# Patient Record
Sex: FEMALE | Race: WHITE | NOT HISPANIC OR LATINO | URBAN - METROPOLITAN AREA
[De-identification: names, ages, dates, MRNs, and addresses within clinical notes are randomized per-mention and may not be internally consistent; named-entity substitution may affect disease eponyms.]

---

## 2023-03-28 LAB
ALANINE AMINOTRANSFERASE (SGPT) (U/L) IN SER/PLAS: 14 U/L (ref 7–45)
ALBUMIN (G/DL) IN SER/PLAS: 4.4 G/DL (ref 3.4–5)
ALKALINE PHOSPHATASE (U/L) IN SER/PLAS: 75 U/L (ref 33–136)
ANION GAP IN SER/PLAS: 16 MMOL/L (ref 10–20)
ASPARTATE AMINOTRANSFERASE (SGOT) (U/L) IN SER/PLAS: 17 U/L (ref 9–39)
BILIRUBIN TOTAL (MG/DL) IN SER/PLAS: 0.5 MG/DL (ref 0–1.2)
C REACTIVE PROTEIN (MG/L) IN SER/PLAS: 0.17 MG/DL
CALCIUM (MG/DL) IN SER/PLAS: 10.2 MG/DL (ref 8.6–10.6)
CARBON DIOXIDE, TOTAL (MMOL/L) IN SER/PLAS: 26 MMOL/L (ref 21–32)
CHLORIDE (MMOL/L) IN SER/PLAS: 106 MMOL/L (ref 98–107)
CREATININE (MG/DL) IN SER/PLAS: 0.89 MG/DL (ref 0.5–1.05)
ERYTHROCYTE DISTRIBUTION WIDTH (RATIO) BY AUTOMATED COUNT: 13.4 % (ref 11.5–14.5)
ERYTHROCYTE MEAN CORPUSCULAR HEMOGLOBIN CONCENTRATION (G/DL) BY AUTOMATED: 31.1 G/DL (ref 32–36)
ERYTHROCYTE MEAN CORPUSCULAR VOLUME (FL) BY AUTOMATED COUNT: 101 FL (ref 80–100)
ERYTHROCYTES (10*6/UL) IN BLOOD BY AUTOMATED COUNT: 4.5 X10E12/L (ref 4–5.2)
GFR FEMALE: 64 ML/MIN/1.73M2
GLUCOSE (MG/DL) IN SER/PLAS: 107 MG/DL (ref 74–99)
HEMATOCRIT (%) IN BLOOD BY AUTOMATED COUNT: 45.3 % (ref 36–46)
HEMOGLOBIN (G/DL) IN BLOOD: 14.1 G/DL (ref 12–16)
LEUKOCYTES (10*3/UL) IN BLOOD BY AUTOMATED COUNT: 6.9 X10E9/L (ref 4.4–11.3)
NRBC (PER 100 WBCS) BY AUTOMATED COUNT: 0 /100 WBC (ref 0–0)
PLATELETS (10*3/UL) IN BLOOD AUTOMATED COUNT: 209 X10E9/L (ref 150–450)
POTASSIUM (MMOL/L) IN SER/PLAS: 4.4 MMOL/L (ref 3.5–5.3)
PROTEIN TOTAL: 7.2 G/DL (ref 6.4–8.2)
SEDIMENTATION RATE, ERYTHROCYTE: 15 MM/H (ref 0–30)
SODIUM (MMOL/L) IN SER/PLAS: 144 MMOL/L (ref 136–145)
UREA NITROGEN (MG/DL) IN SER/PLAS: 22 MG/DL (ref 6–23)

## 2023-04-12 LAB
ANION GAP IN SER/PLAS: 18 MMOL/L (ref 10–20)
CALCIUM (MG/DL) IN SER/PLAS: 10.5 MG/DL (ref 8.6–10.6)
CARBON DIOXIDE, TOTAL (MMOL/L) IN SER/PLAS: 28 MMOL/L (ref 21–32)
CHLORIDE (MMOL/L) IN SER/PLAS: 108 MMOL/L (ref 98–107)
CREATININE (MG/DL) IN SER/PLAS: 0.99 MG/DL (ref 0.5–1.05)
ERYTHROCYTE DISTRIBUTION WIDTH (RATIO) BY AUTOMATED COUNT: 13.5 % (ref 11.5–14.5)
ERYTHROCYTE MEAN CORPUSCULAR HEMOGLOBIN CONCENTRATION (G/DL) BY AUTOMATED: 31.2 G/DL (ref 32–36)
ERYTHROCYTE MEAN CORPUSCULAR VOLUME (FL) BY AUTOMATED COUNT: 101 FL (ref 80–100)
ERYTHROCYTES (10*6/UL) IN BLOOD BY AUTOMATED COUNT: 4.63 X10E12/L (ref 4–5.2)
GFR FEMALE: 56 ML/MIN/1.73M2
GLUCOSE (MG/DL) IN SER/PLAS: 98 MG/DL (ref 74–99)
HEMATOCRIT (%) IN BLOOD BY AUTOMATED COUNT: 46.8 % (ref 36–46)
HEMOGLOBIN (G/DL) IN BLOOD: 14.6 G/DL (ref 12–16)
LEUKOCYTES (10*3/UL) IN BLOOD BY AUTOMATED COUNT: 7.4 X10E9/L (ref 4.4–11.3)
NRBC (PER 100 WBCS) BY AUTOMATED COUNT: 0 /100 WBC (ref 0–0)
PLATELETS (10*3/UL) IN BLOOD AUTOMATED COUNT: 243 X10E9/L (ref 150–450)
POTASSIUM (MMOL/L) IN SER/PLAS: 4.8 MMOL/L (ref 3.5–5.3)
SODIUM (MMOL/L) IN SER/PLAS: 149 MMOL/L (ref 136–145)
THYROTROPIN (MIU/L) IN SER/PLAS BY DETECTION LIMIT <= 0.05 MIU/L: 5.26 MIU/L (ref 0.44–3.98)
THYROXINE (T4) FREE (NG/DL) IN SER/PLAS: 1.04 NG/DL (ref 0.78–1.48)
UREA NITROGEN (MG/DL) IN SER/PLAS: 32 MG/DL (ref 6–23)

## 2023-04-20 LAB
ALANINE AMINOTRANSFERASE (SGPT) (U/L) IN SER/PLAS: 16 U/L (ref 7–45)
ALBUMIN (G/DL) IN SER/PLAS: 4.6 G/DL (ref 3.4–5)
ALKALINE PHOSPHATASE (U/L) IN SER/PLAS: 89 U/L (ref 33–136)
ANION GAP IN SER/PLAS: 15 MMOL/L (ref 10–20)
ASPARTATE AMINOTRANSFERASE (SGOT) (U/L) IN SER/PLAS: 17 U/L (ref 9–39)
BILIRUBIN TOTAL (MG/DL) IN SER/PLAS: 0.5 MG/DL (ref 0–1.2)
CALCIUM (MG/DL) IN SER/PLAS: 10 MG/DL (ref 8.6–10.6)
CARBON DIOXIDE, TOTAL (MMOL/L) IN SER/PLAS: 31 MMOL/L (ref 21–32)
CHLORIDE (MMOL/L) IN SER/PLAS: 105 MMOL/L (ref 98–107)
CREATININE (MG/DL) IN SER/PLAS: 0.98 MG/DL (ref 0.5–1.05)
GFR FEMALE: 57 ML/MIN/1.73M2
GLUCOSE (MG/DL) IN SER/PLAS: 87 MG/DL (ref 74–99)
POTASSIUM (MMOL/L) IN SER/PLAS: 4.1 MMOL/L (ref 3.5–5.3)
PROTEIN TOTAL: 7.1 G/DL (ref 6.4–8.2)
SODIUM (MMOL/L) IN SER/PLAS: 147 MMOL/L (ref 136–145)
UREA NITROGEN (MG/DL) IN SER/PLAS: 21 MG/DL (ref 6–23)

## 2023-05-16 LAB
6-ACETYLMORPHINE: <25 NG/ML
7-AMINOCLONAZEPAM: <25 NG/ML
ALPHA-HYDROXYALPRAZOLAM: <25 NG/ML
ALPHA-HYDROXYMIDAZOLAM: <25 NG/ML
ALPRAZOLAM: <25 NG/ML
AMPHETAMINE (PRESENCE) IN URINE BY SCREEN METHOD: ABNORMAL
BARBITURATES PRESENCE IN URINE BY SCREEN METHOD: ABNORMAL
CANNABINOIDS IN URINE BY SCREEN METHOD: ABNORMAL
CHLORDIAZEPOXIDE: <25 NG/ML
CLONAZEPAM: <25 NG/ML
COCAINE (PRESENCE) IN URINE BY SCREEN METHOD: ABNORMAL
CODEINE: <50 NG/ML
CREATINE, URINE FOR DRUG: 91.1 MG/DL
DIAZEPAM: <25 NG/ML
DRUG SCREEN COMMENT URINE: ABNORMAL
EDDP: <25 NG/ML
FENTANYL CONFIRMATION, URINE: <2.5 NG/ML
HYDROCODONE: 466 NG/ML
HYDROMORPHONE: 157 NG/ML
LORAZEPAM: <25 NG/ML
METHADONE CONFIRMATION,URINE: <25 NG/ML
MIDAZOLAM: <25 NG/ML
MORPHINE URINE: <50 NG/ML
NORDIAZEPAM: <25 NG/ML
NORFENTANYL: <2.5 NG/ML
NORHYDROCODONE: 757 NG/ML
NOROXYCODONE: <25 NG/ML
O-DESMETHYLTRAMADOL: <50 NG/ML
OXAZEPAM: <25 NG/ML
OXYCODONE: <25 NG/ML
OXYMORPHONE: <25 NG/ML
PHENCYCLIDINE (PRESENCE) IN URINE BY SCREEN METHOD: ABNORMAL
TEMAZEPAM: <25 NG/ML
TRAMADOL: <50 NG/ML
ZOLPIDEM METABOLITE (ZCA): <25 NG/ML
ZOLPIDEM: <25 NG/ML

## 2023-06-01 LAB
ANION GAP IN SER/PLAS: 13 MMOL/L (ref 10–20)
BASOPHILS (10*3/UL) IN BLOOD BY AUTOMATED COUNT: 0.07 X10E9/L (ref 0–0.1)
BASOPHILS/100 LEUKOCYTES IN BLOOD BY AUTOMATED COUNT: 0.7 % (ref 0–2)
CALCIUM (MG/DL) IN SER/PLAS: 9.7 MG/DL (ref 8.6–10.3)
CARBON DIOXIDE, TOTAL (MMOL/L) IN SER/PLAS: 28 MMOL/L (ref 21–32)
CHLORIDE (MMOL/L) IN SER/PLAS: 99 MMOL/L (ref 98–107)
CREATININE (MG/DL) IN SER/PLAS: 0.76 MG/DL (ref 0.5–1.05)
EOSINOPHILS (10*3/UL) IN BLOOD BY AUTOMATED COUNT: 0.03 X10E9/L (ref 0–0.4)
EOSINOPHILS/100 LEUKOCYTES IN BLOOD BY AUTOMATED COUNT: 0.3 % (ref 0–6)
ERYTHROCYTE DISTRIBUTION WIDTH (RATIO) BY AUTOMATED COUNT: 12.2 % (ref 11.5–14.5)
ERYTHROCYTE MEAN CORPUSCULAR HEMOGLOBIN CONCENTRATION (G/DL) BY AUTOMATED: 32.9 G/DL (ref 32–36)
ERYTHROCYTE MEAN CORPUSCULAR VOLUME (FL) BY AUTOMATED COUNT: 95 FL (ref 80–100)
ERYTHROCYTES (10*6/UL) IN BLOOD BY AUTOMATED COUNT: 4.11 X10E12/L (ref 4–5.2)
GFR FEMALE: 77 ML/MIN/1.73M2
GLUCOSE (MG/DL) IN SER/PLAS: 95 MG/DL (ref 74–99)
HEMATOCRIT (%) IN BLOOD BY AUTOMATED COUNT: 38.9 % (ref 36–46)
HEMOGLOBIN (G/DL) IN BLOOD: 12.8 G/DL (ref 12–16)
IMMATURE GRANULOCYTES/100 LEUKOCYTES IN BLOOD BY AUTOMATED COUNT: 0.4 % (ref 0–0.9)
LEUKOCYTES (10*3/UL) IN BLOOD BY AUTOMATED COUNT: 10.5 X10E9/L (ref 4.4–11.3)
LYMPHOCYTES (10*3/UL) IN BLOOD BY AUTOMATED COUNT: 0.9 X10E9/L (ref 0.8–3)
LYMPHOCYTES/100 LEUKOCYTES IN BLOOD BY AUTOMATED COUNT: 8.6 % (ref 13–44)
MONOCYTES (10*3/UL) IN BLOOD BY AUTOMATED COUNT: 1.57 X10E9/L (ref 0.05–0.8)
MONOCYTES/100 LEUKOCYTES IN BLOOD BY AUTOMATED COUNT: 14.9 % (ref 2–10)
NEUTROPHILS (10*3/UL) IN BLOOD BY AUTOMATED COUNT: 7.91 X10E9/L (ref 1.6–5.5)
NEUTROPHILS/100 LEUKOCYTES IN BLOOD BY AUTOMATED COUNT: 75.1 % (ref 40–80)
PLATELETS (10*3/UL) IN BLOOD AUTOMATED COUNT: 326 X10E9/L (ref 150–450)
POTASSIUM (MMOL/L) IN SER/PLAS: 3.3 MMOL/L (ref 3.5–5.3)
SODIUM (MMOL/L) IN SER/PLAS: 137 MMOL/L (ref 136–145)
UREA NITROGEN (MG/DL) IN SER/PLAS: 14 MG/DL (ref 6–23)

## 2023-06-03 LAB — STAPH/MRSA SCREEN, CULTURE: ABNORMAL

## 2023-10-02 ENCOUNTER — TREATMENT (OUTPATIENT)
Dept: PHYSICAL THERAPY | Facility: CLINIC | Age: 83
End: 2023-10-02
Payer: MEDICARE

## 2023-10-02 DIAGNOSIS — M21.379 FOOT DROP, UNSPECIFIED FOOT: ICD-10-CM

## 2023-10-02 DIAGNOSIS — M79.651 PAIN IN RIGHT THIGH: ICD-10-CM

## 2023-10-02 DIAGNOSIS — M25.561 PAIN IN RIGHT KNEE: ICD-10-CM

## 2023-10-02 DIAGNOSIS — M25.551 RIGHT HIP PAIN: Primary | ICD-10-CM

## 2023-10-02 PROCEDURE — 97110 THERAPEUTIC EXERCISES: CPT | Mod: GP | Performed by: PHYSICAL THERAPIST

## 2023-10-02 ASSESSMENT — PAIN - FUNCTIONAL ASSESSMENT: PAIN_FUNCTIONAL_ASSESSMENT: 0-10

## 2023-10-02 ASSESSMENT — PAIN SCALES - GENERAL: PAINLEVEL_OUTOF10: 0 - NO PAIN

## 2023-10-02 NOTE — PROGRESS NOTES
Physical Therapy    Physical Therapy Treatment    Patient Name: Mckenna Ferguson  MRN: [id number]  Today's Date: 10/2/2023  Time Calculation  Start Time: 1024  Stop Time: 1103  Time Calculation (min): 39 min      Assessment:  Pt did well with session, some fatigue        Plan: visit 3/5 cert  OP PT Plan  Treatment/Interventions: Gait training, Therapeutic activities, Therapeutic exercises  PT Plan: Skilled PT  PT Frequency: 2 times per week  Duration: 12 weeks  Certification Period Start Date: 09/11/23  Certification Period End Date: 11/09/23  Number of Treatments Authorized: 5  Rehab Potential: Good  Plan of Care Agreement: Patient    Current Problem  1. Right hip pain        2. Foot drop, unspecified foot  Referral to Physical Therapy      3. Pain in right knee  Referral to Physical Therapy      4. Pain in right thigh  Referral to Physical Therapy          Subjective   Pt reports recovering from flu, also having non surgical groin pain left-4/10     Precautions  Precautions  Post-Surgical Precautions: Right hip precautions     Pain  Pain Assessment: 0-10  Pain Score: 0 - No pain    Objective   Pt not wearing hearing aides [place], hip still dropping walking with cane      Treatments:  supine: Rt MERCEDEZ 15+ weeks p/o  seated Nustep UE/LE, seat back not past 90 deg. L3 8 min  , oral review hep     hook:  -hip ER bilat orange  band 2 x 13  -hip add isometrics 2x13 gentle-small ball  w/TVA-deferred today  - mini bridge 2x15    supine  sidelying hip abd RT with pillow between knees, 1 x 10, 1 x 8-min PT assist for form    seated:  LAQ 2 x 15/2# LT/RT  toe raise 2x15     sit to stand 2 x 10, raised mat table-no hands     stand with UE support   hip abd 2x14  small range RT-DNP    RT ext small range 2 x 14  hamcurl 2 x 14/2# RT       mini squats x15 B UE support  no UE support-side side  wt shifts 30x   standing heel raises 2 x 10  calf stretch RB x 20 sec   side step x8 B UE support   step up forward 4 inch 2 x 12 RTx1 UE  support  RT lateral step ups 4 inch 2x10 B UE support DNP today  rt on block step down left,2 inch x12    cane and rail step over back line on floor x10 ea       Goals:  6 STG  Pt will verbalize MERCEDEZ precautions>>A  Pt will be I with HEP>>A  Pt will improve WOMAC scores 10% indicating improved function>>ongoing  Pt will increase hip rom to 90 deg flexion>>A  LTG 12  Pt will perform stairs step over step with hand rail assist (PLOF)  Pt will be able to drive and report how to transfer correctly.>>A  Pt will increase strength 1 MMT grade RT LE  Pt will walk room distances with SC without Trendelenburg

## 2023-10-04 PROBLEM — L81.4 OTHER MELANIN HYPERPIGMENTATION: Status: ACTIVE | Noted: 2019-04-17
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
## 2023-10-04 PROBLEM — M16.11 OSTEOARTHRITIS OF RIGHT HIP: Status: ACTIVE | Noted: 2023-10-04

## 2023-10-04 PROBLEM — M21.379 FOOT-DROP: Status: ACTIVE | Noted: 2023-10-04

## 2023-10-04 PROBLEM — R31.9 HEMATURIA: Status: ACTIVE | Noted: 2023-10-04

## 2023-10-04 PROBLEM — M48.061 LUMBAR STENOSIS: Status: ACTIVE | Noted: 2023-10-04

## 2023-10-04 PROBLEM — H11.821 CONJUNCTIVOCHALASIS OF RIGHT EYE: Status: ACTIVE | Noted: 2023-10-04

## 2023-10-04 PROBLEM — R53.83 FATIGUE: Status: ACTIVE | Noted: 2023-10-04

## 2023-10-04 PROBLEM — R42 DIZZINESS: Status: ACTIVE | Noted: 2023-10-04

## 2023-10-04 PROBLEM — M54.16 LUMBAR RADICULOPATHY: Status: ACTIVE | Noted: 2023-10-04

## 2023-10-04 PROBLEM — C44.42 SQUAMOUS CELL CARCINOMA OF SCALP AND SKIN OF NECK: Status: ACTIVE | Noted: 2019-04-17

## 2023-10-04 PROBLEM — H04.123 DRY EYES, BILATERAL: Status: ACTIVE | Noted: 2023-10-04

## 2023-10-04 PROBLEM — H91.90 CHANGE IN HEARING: Status: ACTIVE | Noted: 2023-10-04

## 2023-10-04 PROBLEM — M72.0 DUPUYTREN CONTRACTURE: Status: ACTIVE | Noted: 2023-10-04

## 2023-10-04 PROBLEM — L57.0 ACTINIC KERATOSIS: Status: ACTIVE | Noted: 2019-04-17

## 2023-10-04 PROBLEM — D22.60 MELANOCYTIC NEVI OF UNSPECIFIED UPPER LIMB, INCLUDING SHOULDER: Status: ACTIVE | Noted: 2019-04-17

## 2023-10-04 PROBLEM — D36.7 BENIGN NEOPLASM OF TRUNK: Status: ACTIVE | Noted: 2019-04-17
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
## 2023-10-04 PROBLEM — D31.31 CHOROIDAL NEVUS OF RIGHT EYE: Status: ACTIVE | Noted: 2023-10-04

## 2023-10-04 PROBLEM — G89.29 CHRONIC LEG PAIN: Status: ACTIVE | Noted: 2023-10-04

## 2023-10-04 PROBLEM — E78.00 PURE HYPERCHOLESTEROLEMIA: Status: ACTIVE | Noted: 2023-10-04

## 2023-10-04 PROBLEM — D18.01 HEMANGIOMA OF SKIN AND SUBCUTANEOUS TISSUE: Status: ACTIVE | Noted: 2019-04-17

## 2023-10-04 PROBLEM — N90.89 PERINEAL MASS, FEMALE: Status: ACTIVE | Noted: 2023-10-04

## 2023-10-04 PROBLEM — D18.00 ANGIOMA: Status: ACTIVE | Noted: 2019-04-17

## 2023-10-04 PROBLEM — H26.493 PCO (POSTERIOR CAPSULAR OPACIFICATION), BILATERAL: Status: ACTIVE | Noted: 2023-10-04

## 2023-10-04 PROBLEM — M17.11 ARTHRITIS OF RIGHT KNEE: Status: ACTIVE | Noted: 2023-10-04

## 2023-10-04 PROBLEM — M79.606 CHRONIC LEG PAIN: Status: ACTIVE | Noted: 2023-10-04

## 2023-10-04 PROBLEM — M06.9 RHEUMATOID ARTHRITIS (MULTI): Status: ACTIVE | Noted: 2023-10-04

## 2023-10-04 PROBLEM — D36.7 BENIGN NEOPLASM OF LOWER EXTREMITY: Status: ACTIVE | Noted: 2019-04-17

## 2023-10-04 PROBLEM — R13.10 DYSPHAGIA: Status: ACTIVE | Noted: 2023-10-04

## 2023-10-04 PROBLEM — C44.519 BASAL CELL CARCINOMA OF SKIN OF OTHER PART OF TRUNK: Status: ACTIVE | Noted: 2019-04-17

## 2023-10-04 PROBLEM — C44.329 SQUAMOUS CELL CARCINOMA OF SKIN OF OTHER PARTS OF FACE: Status: ACTIVE | Noted: 2019-04-17

## 2023-10-04 PROBLEM — L82.1 SEBORRHEIC KERATOSIS: Status: ACTIVE | Noted: 2019-04-17

## 2023-10-04 PROBLEM — H90.3 SENSORINEURAL HEARING LOSS, ASYMMETRICAL: Status: ACTIVE | Noted: 2023-10-04

## 2023-10-04 PROBLEM — H11.822 CONJUNCTIVOCHALASIS OF LEFT EYE: Status: ACTIVE | Noted: 2023-10-04

## 2023-10-04 PROBLEM — D22.70 MELANOCYTIC NEVI OF UNSPECIFIED LOWER LIMB, INCLUDING HIP: Status: ACTIVE | Noted: 2019-04-17

## 2023-10-04 PROBLEM — N95.2 VAGINAL ATROPHY: Status: ACTIVE | Noted: 2023-10-04

## 2023-10-04 PROBLEM — D04.4 CARCINOMA IN SITU OF SKIN OF SCALP AND NECK: Status: ACTIVE | Noted: 2019-04-17

## 2023-10-04 PROBLEM — R79.9 ABNORMAL BLOOD CHEMISTRY: Status: ACTIVE | Noted: 2023-10-04

## 2023-10-04 PROBLEM — D72.9 NEUTROPHILIA: Status: ACTIVE | Noted: 2023-10-04

## 2023-10-04 PROBLEM — L81.4 LENTIGINES: Status: ACTIVE | Noted: 2019-04-17

## 2023-10-04 PROBLEM — C44.619 BASAL CELL CARCINOMA OF SKIN OF LEFT UPPER LIMB, INCLUDING SHOULDER: Status: ACTIVE | Noted: 2019-04-17

## 2023-10-04 PROBLEM — M10.9 GOUT: Status: ACTIVE | Noted: 2023-10-04

## 2023-10-04 PROBLEM — R71.8 ELEVATED MCV: Status: ACTIVE | Noted: 2023-10-04

## 2023-10-04 PROBLEM — D72.828 NEUTROPHILIA: Status: ACTIVE | Noted: 2023-10-04

## 2023-10-04 PROBLEM — E55.9 VITAMIN D DEFICIENCY: Status: ACTIVE | Noted: 2023-10-04

## 2023-10-04 PROBLEM — D36.7 BENIGN NEOPLASM OF UPPER EXTREMITY: Status: ACTIVE | Noted: 2019-04-17

## 2023-10-04 PROBLEM — N28.9 RENAL INSUFFICIENCY: Status: ACTIVE | Noted: 2023-10-04

## 2023-10-04 PROBLEM — T81.9XXA COMPLICATION OF SURGICAL PROCEDURE: Status: ACTIVE | Noted: 2023-10-04

## 2023-10-04 PROBLEM — D22.5 MELANOCYTIC NEVI OF TRUNK: Status: ACTIVE | Noted: 2019-04-17

## 2023-10-04 PROBLEM — G90.9 AUTONOMIC DYSFUNCTION: Status: ACTIVE | Noted: 2023-10-04

## 2023-10-04 PROBLEM — E03.9 HYPOTHYROIDISM: Status: ACTIVE | Noted: 2023-10-04

## 2023-10-04 PROBLEM — Z96.1 PSEUDOPHAKIA, BOTH EYES: Status: ACTIVE | Noted: 2023-10-04

## 2023-10-04 PROBLEM — I10 HYPERTENSION: Status: ACTIVE | Noted: 2023-10-04

## 2023-10-04 PROBLEM — G47.00 INSOMNIA: Status: ACTIVE | Noted: 2023-10-04

## 2023-10-04 PROBLEM — H93.13 SUBJECTIVE TINNITUS, BILATERAL: Status: ACTIVE | Noted: 2023-10-04

## 2023-10-04 PROBLEM — B02.9 SHINGLES: Status: ACTIVE | Noted: 2023-10-04

## 2023-10-04 PROBLEM — H01.009 BLEPHARITIS: Status: ACTIVE | Noted: 2023-10-04

## 2023-10-04 PROBLEM — R15.9 INCONTINENCE OF FECES: Status: ACTIVE | Noted: 2023-10-04

## 2023-10-04 PROBLEM — G47.9 SLEEP DISTURBANCES: Status: ACTIVE | Noted: 2023-10-04

## 2023-10-04 PROBLEM — N81.10 FEMALE BLADDER PROLAPSE: Status: ACTIVE | Noted: 2023-10-04

## 2023-10-04 PROBLEM — M35.3 POLYMYALGIA RHEUMATICA (MULTI): Status: ACTIVE | Noted: 2023-10-04

## 2023-10-04 RX ORDER — ATORVASTATIN CALCIUM 10 MG/1
10 TABLET, FILM COATED ORAL DAILY
COMMUNITY
Start: 2023-07-31

## 2023-10-04 RX ORDER — TRIAMCINOLONE ACETONIDE 1 MG/G
OINTMENT TOPICAL
COMMUNITY
Start: 2021-02-09

## 2023-10-04 RX ORDER — RIVAROXABAN 20 MG/1
20 TABLET, FILM COATED ORAL DAILY
COMMUNITY
Start: 2023-07-31

## 2023-10-04 RX ORDER — ATENOLOL 25 MG/1
25 TABLET ORAL
COMMUNITY
Start: 2023-07-24

## 2023-10-04 RX ORDER — GABAPENTIN 300 MG/1
300 CAPSULE ORAL NIGHTLY
COMMUNITY

## 2023-10-04 RX ORDER — ERGOCALCIFEROL 1.25 MG/1
1 CAPSULE ORAL
COMMUNITY
Start: 2022-06-13

## 2023-10-04 RX ORDER — FLUOROURACIL 50 MG/G
CREAM TOPICAL
COMMUNITY
Start: 2022-10-25

## 2023-10-04 RX ORDER — HYDROXYCHLOROQUINE SULFATE 200 MG/1
1 TABLET, FILM COATED ORAL DAILY
COMMUNITY

## 2023-10-04 RX ORDER — GABAPENTIN 400 MG/1
400 CAPSULE ORAL NIGHTLY
COMMUNITY
Start: 2023-08-30 | End: 2023-10-29

## 2023-10-04 RX ORDER — LEVOTHYROXINE SODIUM 50 UG/1
50 TABLET ORAL
COMMUNITY
Start: 2023-06-03

## 2023-10-04 RX ORDER — HYDROXYZINE HYDROCHLORIDE 10 MG/1
TABLET, FILM COATED ORAL
COMMUNITY
Start: 2022-10-16

## 2023-10-04 RX ORDER — FAMOTIDINE 40 MG/1
1 TABLET, FILM COATED ORAL NIGHTLY
COMMUNITY
Start: 2022-03-11

## 2023-10-04 RX ORDER — PANTOPRAZOLE SODIUM 40 MG/1
TABLET, DELAYED RELEASE ORAL
COMMUNITY
Start: 2023-06-14

## 2023-10-04 RX ORDER — HYDROCHLOROTHIAZIDE 12.5 MG/1
12.5 TABLET ORAL DAILY
COMMUNITY
Start: 2023-06-07

## 2023-10-04 RX ORDER — CHOLECALCIFEROL (VITAMIN D3) 125 MCG
CAPSULE ORAL
COMMUNITY

## 2023-10-04 RX ORDER — LEVOTHYROXINE SODIUM 75 UG/1
1 TABLET ORAL DAILY
COMMUNITY
Start: 2012-08-23

## 2023-10-04 RX ORDER — ALLOPURINOL 100 MG/1
100 TABLET ORAL DAILY
COMMUNITY

## 2023-10-04 RX ORDER — LEFLUNOMIDE 10 MG/1
10 TABLET ORAL DAILY
COMMUNITY
End: 2023-12-29 | Stop reason: SDUPTHER

## 2023-10-04 RX ORDER — MICONAZOLE NITRATE 2 %
POWDER (GRAM) TOPICAL
COMMUNITY
Start: 2017-02-28

## 2023-10-04 RX ORDER — DUPILUMAB 300 MG/2ML
INJECTION, SOLUTION SUBCUTANEOUS
COMMUNITY
Start: 2022-12-06

## 2023-10-04 RX ORDER — DULOXETIN HYDROCHLORIDE 60 MG/1
60 CAPSULE, DELAYED RELEASE ORAL DAILY
COMMUNITY

## 2023-10-04 RX ORDER — CETIRIZINE HYDROCHLORIDE 10 MG/1
1 TABLET ORAL DAILY
COMMUNITY
Start: 2022-07-29

## 2023-10-04 RX ORDER — ACETAMINOPHEN AND CODEINE PHOSPHATE 300; 30 MG/1; MG/1
1 TABLET ORAL NIGHTLY
COMMUNITY
Start: 2023-06-09

## 2023-10-04 RX ORDER — CLOBETASOL PROPIONATE 0.5 MG/G
OINTMENT TOPICAL
COMMUNITY
Start: 2021-05-28

## 2023-10-04 RX ORDER — CALCIPOTRIENE 50 UG/G
CREAM TOPICAL
COMMUNITY
Start: 2022-10-25

## 2023-10-04 RX ORDER — CHLORHEXIDINE GLUCONATE ORAL RINSE 1.2 MG/ML
SOLUTION DENTAL
COMMUNITY
Start: 2023-06-01

## 2023-10-04 RX ORDER — AMLODIPINE BESYLATE 10 MG/1
10 TABLET ORAL DAILY
COMMUNITY

## 2023-10-05 ENCOUNTER — APPOINTMENT (OUTPATIENT)
Dept: PHYSICAL THERAPY | Facility: CLINIC | Age: 83
End: 2023-10-05
Payer: MEDICARE

## 2023-10-05 ENCOUNTER — TREATMENT (OUTPATIENT)
Dept: PHYSICAL THERAPY | Facility: CLINIC | Age: 83
End: 2023-10-05
Payer: MEDICARE

## 2023-10-05 DIAGNOSIS — R26.9 ABNORMAL GAIT: ICD-10-CM

## 2023-10-05 DIAGNOSIS — Z96.641 PRESENCE OF RIGHT ARTIFICIAL HIP JOINT: Primary | ICD-10-CM

## 2023-10-05 DIAGNOSIS — R26.2 DIFFICULTY WALKING: ICD-10-CM

## 2023-10-05 DIAGNOSIS — M25.551 RIGHT HIP PAIN: Primary | ICD-10-CM

## 2023-10-05 DIAGNOSIS — Z96.641 PRESENCE OF RIGHT ARTIFICIAL HIP JOINT: ICD-10-CM

## 2023-10-05 PROCEDURE — 97110 THERAPEUTIC EXERCISES: CPT | Mod: GP | Performed by: PHYSICAL THERAPIST

## 2023-10-05 ASSESSMENT — ENCOUNTER SYMPTOMS
DEPRESSION: 0
LOSS OF SENSATION IN FEET: 1
OCCASIONAL FEELINGS OF UNSTEADINESS: 1

## 2023-10-05 NOTE — PROGRESS NOTES
Physical Therapy    Physical Therapy Treatment    Patient Name: Mckenna Ferguson  MRN: [id number]  Today's Date: 10/5/2023  Time Calculation  Start Time: 1343  Stop Time: 1428  Time Calculation (min): 45 min  visit 4/5 cert,ends 11/29    Assessment:  Pt is progressing now doing stairs step over step. Gait still requires assistive device, hip RT is still weak in stance phase RT.  Pt is slower to progress d/t multiple co morbidities-L fusion, foot drop Lt, O/A LThip     Plan: recheck next session, rescore her WOMAC  OP PT Plan  Treatment/Interventions: Gait training, Therapeutic activities, Therapeutic exercises  PT Plan: Skilled PT  PT Frequency: 2 times per week  Duration: 12 weeks  Certification Period Start Date: 09/11/23  Certification Period End Date: 11/09/23  Number of Treatments Authorized: 5  Rehab Potential: Good  Plan of Care Agreement: Patient    Current Problem  1. Right hip pain        2. Presence of right artificial hip joint  Follow Up In Physical Therapy      3. Difficulty walking  Follow Up In Physical Therapy      4. Abnormal gait  Follow Up In Physical Therapy            Subjective   Pt reports recovering from flu, also having non surgical groin pain left-resolved its self     Precautions  MERCEDEZ , L fusion        Pain  0/10       Objective   Pt wearing hearing aides [place], RT hip still dropping walking with cane   Hip abd 3/5 RT     Treatments:  AARTI-40  supine: Rt MERCEDEZ 16 weeks p/o  seated Nustep UE/LE, seat back not past 90 deg. L3 9 min  , oral review hep     hook:  -hip ER bilat orange  band 2 x 13  -hip add isometrics 2x13 gentle-small ball  w/TVA-deferred today  - mini bridge 2x15    supine  sidelying hip abd RT with pillow between knees, 1 x 10, 1 x 8-min PT assist for form    seated:  LAQ 2 x 15/10# LT/RT  toe raise 2x15     sit to stand 2 x 10,from NUstep-no hands     stand with UE support     RT ext small range 2 x 15  hamcurl 2 x 14/2# RT  Hip abd left to work RT side x10  x5     mini squats x15  B UE support  no UE support-side side  wt shifts 30x   standing heel raises 2 x 12  calf stretch RB x 20 sec   side step x8 B UE support   step up forward 6 inch x15 RTx1 UE support  RT lateral step ups 4 inch 2x10 B UE support DNP today  rt on block  4 step down left x10 rail and cane       NM RE-ED:5 min  Balance DLS no hands 1 EC,both closed  cane and rail step over back line on floor x12 ea   Side step over jesenia hold rail 2 hands        Goals:  6 STG  Pt will verbalize MERCEDEZ precautions>>A  Pt will be I with HEP>>A  Pt will improve WOMAC scores 10% indicating improved function>>ongoing  Pt will increase hip rom to 90 deg flexion>>A  LTG 12  Pt will perform stairs step over step with hand rail assist (PLOF)>>>performing stairs step over step +Bilateral hand rail support  Pt will be able to drive and report how to transfer correctly.>>A  Pt will increase strength 1 MMT grade RT LE  Pt will walk room distances with SC without Trendelenburg>>deviations still noted but can do short distances with  cane

## 2023-10-09 ENCOUNTER — TREATMENT (OUTPATIENT)
Dept: PHYSICAL THERAPY | Facility: CLINIC | Age: 83
End: 2023-10-09
Payer: MEDICARE

## 2023-10-09 DIAGNOSIS — M25.551 RIGHT HIP PAIN: Primary | ICD-10-CM

## 2023-10-09 DIAGNOSIS — R26.9 ABNORMAL GAIT: ICD-10-CM

## 2023-10-09 DIAGNOSIS — Z96.641 PRESENCE OF RIGHT ARTIFICIAL HIP JOINT: ICD-10-CM

## 2023-10-09 DIAGNOSIS — R26.2 DIFFICULTY WALKING: ICD-10-CM

## 2023-10-09 PROCEDURE — 97110 THERAPEUTIC EXERCISES: CPT | Mod: GP | Performed by: PHYSICAL THERAPIST

## 2023-10-09 NOTE — PROGRESS NOTES
[facility]    For initial requests:      CPT Requested:  AARTI [id number], NMRE [id number], Therapeutic activities [id number], and Gait [id number]    Dates Requested:  10/10/23-12/10/23    # of Visits Requested:  5    Outcome Tool:  WOMAC  Score:  31.25    Select all conditions that apply (select all that apply):  Musculoskeletal disorders and Neurological conditions    Did the onset of injury or condition occur within the last six (6) months?  Yes    Indicate the justification for using Outpatient Hospital (select all that apply):  None of these apply or unkown    Is the requested treatment for a complex neurological, medical or multi-trauma condition? No      For additional Therapy visits after initial authorized visits are complete:    Select the primary mitigating factor which impeded progress:  Poor follow through due to factors not listed above, pt had the flu missed some time, pt has co-morbidities listed in progress note and age-84 yo    How many Short Term Goals written 4.  How many goals met:   3    How many Long Term Goals written  4.  How many goals met:    2

## 2023-10-09 NOTE — PROGRESS NOTES
Physical Therapy    Physical Therapy Treatment    Patient Name: Mckenna Ferguson  MRN: [id number]  Today's Date: 10/9/2023  Time Calculation  Start Time: 1103  Stop Time: 1152  Time Calculation (min): 49 min  visit 5/5 cert,ends 11/29 15 total plus IE    Assessment:  Just fatigue post session no greater pain    Pt is progressing now doing stairs step over step. Gait still requires assistive device, hip RT is still weak in stance phase as well as left preexisting.  Pt is slower to progress d/t multiple co morbidities-L fusion, foot drop Lt, O/A LT hip.  Pt could benefit from continued PT 1x week for 5 more sessions, will reach out to insurance      Plan:   Pt to start with biking at home easy resistance and seat back limit flexion  Await insurance response for more sessions        Current Problem  1. Right hip pain        2. Presence of right artificial hip joint  Follow Up In Physical Therapy      3. Difficulty walking  Follow Up In Physical Therapy      4. Abnormal gait  Follow Up In Physical Therapy              Subjective   Pt reports she still can't walk with cane without as much deviations as she did prior to surgery.  Pt feels she is doing better practicing the cane at home-more steady.  Pt still has lack of endurance that she did not have pre op.  Pt feels she is sitting to standing better. Pt is doing stairs step over step and needs 2 rails to go down.       Precautions  RT MERCEDEZ , L fusion, foot drop LT        Pain  0/10       Objective   WOMAC-31.25%, last recheck 36.5%-21% goal    Pt wearing hearing aides [place], RT hip still dropping walking with cane   Strength-MMT:Hip abd 3/5 RT, quad/ham 4/5  Balance: pt can double leg stance with some sway Lt knee more flexed, stagger stance mod sway        Treatments:  Gait train:1  With cane in lt hand , 3pt 15 feet x2     AARTI-41  supine: Rt MERCEDEZ 16+ weeks p/o  seated Nustep UE/LE, seat back not past 90 deg. L3 10 min  , oral review hep     hook:  -hip ER bilat orange  band  2 x 14  -hip add isometrics x 10 gentle-small ball withTVA  - mini bridge 2x15    supine  sidelying hip abd RT with pillow between knees, 2x10    seated:  LAQ 2 x 10/2# LT/RT  toe raise 2x15 slight shift to RT    sit to stand 2 x 12,from NUstep-no hands     stand with UE support :  RT ext small range 2 x 15  hamcurl 2 x 15/2# RT  Hip abd left to work RT side 2x10     mini squats 2x15 B UE support  no UE support-side side  wt shifts 30x   standing heel raises 2 x 12 shift more RT  calf stretch RB x 20 sec   side step x8 B UE support   step up forward 6 inch x15 RTx1 UE support DNp  RT lateral step ups 4 inch 2x10 B UE support DNP  rt on block  4 step down left x15 rail and cane       NM RE-ED:5 min  Balance DLS no hands 1 EC,both closed  Forward step over jesenia yellow x10 RT-1 hand rail, LT side needed  walker and rail   Side step over jesenia hold rail 2 hands x10 step         Goals:  6 STG  Pt will verbalize MERCEDEZ precautions>>A  Pt will be I with HEP>>A  Pt will improve WOMAC scores 10% indicating improved function>>NA,ongoing  Pt will increase hip rom to 90 deg flexion>>A  LTG 12  Pt will perform stairs step over step with hand rail assist (PLOF)>>>performing stairs step over step +Bilateral hand rail support  Pt will be able to drive and report how to transfer correctly.>>A  Pt will increase strength 1 MMT grade RT LE, PA hip abd to 3/5 now  Pt will walk room distances with SC without Trendelenburg>>deviations still noted but can do short distances with  cane >>NA on going

## 2023-10-12 ENCOUNTER — TELEPHONE (OUTPATIENT)
Dept: PHYSICAL THERAPY | Facility: CLINIC | Age: 83
End: 2023-10-12
Payer: MEDICARE

## 2023-10-12 DIAGNOSIS — M25.551 RIGHT HIP PAIN: Primary | ICD-10-CM

## 2023-10-12 NOTE — PROGRESS NOTES
Spoke with ellen romero [facility] for peer to peer  4 sessions approved 10/13-11/11/23 1x a week  [id number]

## 2023-10-18 ENCOUNTER — TREATMENT (OUTPATIENT)
Dept: PHYSICAL THERAPY | Facility: CLINIC | Age: 83
End: 2023-10-18
Payer: MEDICARE

## 2023-10-18 DIAGNOSIS — M25.551 RIGHT HIP PAIN: ICD-10-CM

## 2023-10-18 PROCEDURE — 97110 THERAPEUTIC EXERCISES: CPT | Mod: GP | Performed by: PHYSICAL THERAPIST

## 2023-10-18 NOTE — PROGRESS NOTES
Physical Therapy    Physical Therapy Treatment    Patient Name: Mckenna Ferguson  MRN: [id number]  Today's Date: 10/18/2023  Time Calculation  Start Time: 1100  Stop Time: 1145  Time Calculation (min): 45 min  visit 1/4 cert,ends 1/11/23 16 total plus IE    Assessment:    Pt is progressing now doing stairs step over step. Gait still requires assistive device, hip RT is still weak in stance phase as well as left preexisting.       Plan  3 sessions remain 1x a week       Current Problem  1. Right hip pain  Follow Up In Physical Therapy              Subjective   At home pt is walking with cane feels steady.  Stairs step over step B hand rails. Pt is up to 20 min or riding her recumbent bike at home     Precautions  RT MERCEDEZ , L fusion, foot drop LT,RA        Pain  Slight groin pain RT past few days comes and goes with stooping down to  items, 0/10       Objective   WOMAC-31.25%, last recheck 36.5%-21% goal    Pt wearing hearing aides [place], RT hip still dropping walking with cane   Strength-MMT:Hip abd 3/5 RT, quad/ham 4/5  Balance: pt can double leg stance with some sway Lt knee more flexed, stagger stance mod sway        Treatments:  AARTI-45  supine: Rt MERCEDEZ 16+ weeks p/o  seated Nustep UE/LE, seat back not past 90 deg. L3 10 min  , oral review hep DNP  Recumbent bike seat back x5 L2    hook:  -hip ER bilat orange  band 2 x 10  -hip add isometrics x 12 gentle-small ball withTVA  - mini bridge 2x10    supine  sidelying hip abd RT with pillow between knees, 2x10    seated:  LAQ 2 x 13/2# LT/RT  toe raise 2x15 slight shift to RT    sit to stand 2 x 13,from raised bed-no hands     stand with UE support :  RT ext small range 2 x 15 RT, x10 lt to work Rt wt bearing  hamcurl 3x10/2# RT  Hip abd left to work RT side 2x12     no UE support-side side  wt shifts 30x , trial of a few steps without cane  standing heel raises 2 x 13 bilateral shift more RT  calf stretch RB 3x 20 sec   side step x8 B UE support   step up forward 6  inch 2x10 RTx1 UE support  RT lateral step ups 4 inch 2x10 B UE support DNP  rt on block  4 step down left x15 rail and cane   Forward step over- 2  jesenia yellow x10 RT-1 hand rail and cane  Side step over jesenia hold rail 2 hands x10 step         Goals:  6 STG  Pt will verbalize MERCEDEZ precautions>>A  Pt will be I with HEP>>A  Pt will improve WOMAC scores 10% indicating improved function>>NA,ongoing  Pt will increase hip rom to 90 deg flexion>>A  LTG changed to 20    Pt will perform stairs step over step with hand rail assist (PLOF)>>>performing stairs step over step +Bilateral hand rail support  Pt will be able to drive and report how to transfer correctly.>>A  Pt will increase strength 1 MMT grade RT LE, PA hip abd to 3/5 now  Pt will walk room distances with SC without Trendelenburg>>deviations still noted but can do short distances with  cane >>NA on going

## 2023-10-25 ENCOUNTER — TREATMENT (OUTPATIENT)
Dept: PHYSICAL THERAPY | Facility: CLINIC | Age: 83
End: 2023-10-25
Payer: MEDICARE

## 2023-10-25 DIAGNOSIS — M25.551 RIGHT HIP PAIN: Primary | ICD-10-CM

## 2023-10-25 PROCEDURE — 97110 THERAPEUTIC EXERCISES: CPT | Mod: GP | Performed by: PHYSICAL THERAPIST

## 2023-10-25 NOTE — PROGRESS NOTES
Physical Therapy    Physical Therapy Treatment    Patient Name: Mckenna Ferguson  MRN: [id number]  Today's Date: 10/25/2023  Time Calculation  Start Time: 1100  Stop Time: 1142  Time Calculation (min): 42 min  visit 2/4 cert,ends 1/11/23 16 total plus IE  Send   POC updated to MD sent note  Assessment:  Pt reports walking without cane some at home with no greater pain   Pt is progressing now doing stairs step over step. Gait still requires assistive device, hip RT is still weak in stance phase as well as left preexisting.       Plan  2 sessions remain 1x a week       Current Problem  1. Right hip pain  Follow Up In Physical Therapy          Pt is 19 weeks post op    Subjective   At home pt is walking with cane feels steady, occasionally goes without short distances. Stairs step over step B hand rails. Pt is up to 25 min riding her recumbent bike at home     Precautions  RT MERCEDEZ , L fusion, foot drop LT,RA        Pain   0/10 pre  Post pain rating 0       Objective   WOMAC-31.25%, last recheck 36.5%-21% goal    Pt wearing hearing aides [place], RT hip still dropping walking with cane   Strength-MMT:Hip abd 3/5 RT, quad/ham 4+/5  Balance: pt can double leg stance with some sway Lt knee more flexed, stagger stance mod sway        Treatments:  AARTI-45  supine: Rt MERCEDEZ 19+ weeks p/o  seated Nustep UE/LE, seat back not past 90 deg. L3 10 min  , oral review hep DNP  Recumbent bike seat back x5 L2    hook:  -hip ER bilat orange  band 2 x 13  -hip add isometrics x 15 gentle-small ball withTVA  - mini bridge 2 x 13    sidelying hip abd RT with pillow between knees, 2x12    seated:  LAQ 2 x 15/2# LT/RT    sit to stand 2 x 14,from NuStep no hands      stand with UE support :  toe raise 2x15 slight shift to RT  RT ext small range 3 x 10 RT, x 15 lt to work Rt wt bearing  hamcurl 3 x 12/2# RT  Hip abd left to work RT side stance 2 x 14     no UE support-side side  wt shifts 30x , trial of a few steps without cane  standing heel raises 2 x 13  bilateral shift more RT  calf stretch RB 3x 20 sec   side step x8 B UE support light   step up forward 6 inch 2x12 RTx1 UE support  RT lateral step ups 4 inch 2x10 B UE support DNP  rt on block  4 step down left x15 rail and cane DNP  Forward step over- 2  jesenia yellow x10 -1 hand rail and cane ea leg  Side step over jesenia hold rail 2 hands x10 step   Walk with cane 30 feet, 6 feet no cane next to rail        Goals:  6 STG  Pt will verbalize MERCEDEZ precautions>>A  Pt will be I with HEP>>A  Pt will improve WOMAC scores 10% indicating improved function>>NA,ongoing  Pt will increase hip rom to 90 deg flexion>>A    LTG changed to 20  Pt will perform stairs step over step with hand rail assist (PLOF)>>>performing stairs step over step +Bilateral hand rail support  Pt will be able to drive and report how to transfer correctly.>>A  Pt will increase strength 1 MMT grade RT LE-quad ham, PA hip abd to 3/5   Pt will walk room distances with SC without Trendelenburg>>deviations still noted but can do short distances with cane >>NA on going, gait looks better with walker

## 2023-11-01 ENCOUNTER — TREATMENT (OUTPATIENT)
Dept: PHYSICAL THERAPY | Facility: CLINIC | Age: 83
End: 2023-11-01
Payer: MEDICARE

## 2023-11-01 DIAGNOSIS — M25.551 RIGHT HIP PAIN: ICD-10-CM

## 2023-11-01 PROCEDURE — 97110 THERAPEUTIC EXERCISES: CPT | Mod: GP | Performed by: PHYSICAL THERAPIST

## 2023-11-01 NOTE — PROGRESS NOTES
Physical Therapy    Physical Therapy Treatment    Patient Name: Mckenna Ferguson  MRN: [id number]  Today's Date: 11/1/2023  Time Calculation  Start Time: 1100  Stop Time: 1145  Time Calculation (min): 45 min  visit 3/4 cert,ends 1/11/23 19 total plus IE  Send   POC updated to MD sent note  Assessment:  Pt reports walking without cane some at home with no greater pain   Pt is progressing now doing stairs step over step. Gait still requires assistive device, hip RT is still weak in stance phase as well as left preexisting.     RT hip abduction getting stronger but still weak.  Plan  1 sessions remain 1x a week- discharge pending        Current Problem  1. Right hip pain  Follow Up In Physical Therapy          Rt MERCEDEZ 20 weeks p/o    Subjective   At home pt is walking with cane in house feels steady, occasionally goes without short distances. Stairs step over step up and down now- B hand rails. Pt is up to 25 min riding her recumbent bike at home  No issues post her last rx session      Precautions  RT MERCEDEZ , L fusion, foot drop LT,RA        Pain   0/10 pre  Post pain rating 0       Objective   WOMAC-31.25%, last recheck 36.5%-21% goal    Pt wearing hearing aides [place], RT hip still dropping walking with cane   Strength-MMT:Hip abd 3/5 RT, quad/ham 4+/5  Balance: pt can double leg stance with some sway Lt knee more flexed, stagger stance mod sway        Treatments:  AARTI-45  supine: Rt MERCEDEZ   Recumbent bike seat back x6 L2  hook:  -hip ER bilat orange  band 2 x 14  -hip add isometrics x 20 gentle-small ball with TVA  - mini bridge 2 x 15    sidelying hip abd RT with pillow between knees, 2x10, 1x4    seated:  LAQ 2 x 10/3# LT/RT  sit to stand 2 x 15 raised table       stand with UE support :  toe raise 2x15 slight shift to RT  RT ext small range 3 x 12 RT,2 x 10 lt to work Rt wt bearing  hamcurl 2 x 10/3# RT  Hip abd left to work RT side stance x 15, x 10     no UE support-side side  wt shifts 30x   standing heel raises 2 x 13  bilateral shift more RT  calf stretch RB 3x 20 sec   side step x8 B UE support light   step up forward 6 inch 2x13 RTx1 UE support  RT lateral step ups 4 inch 2x10 B UE support DNP  rt on block  4 step down left x15 rail and cane DNP  Forward step over-and back  jesenia yellow x10 -1 hand rail and cane ea leg  Side step over jesenia hold rail 2 hands x12 step   Walk no cane next to rail        Goals:  6 STG  Pt will verbalize MERCEDEZ precautions>>A  Pt will be I with HEP>>A  Pt will improve WOMAC scores 10% indicating improved function>>NA,ongoing  Pt will increase hip rom to 90 deg flexion>>A    LTG changed to 20  Pt will perform stairs step over step with hand rail assist (PLOF)>>>performing stairs step over step +Bilateral hand rail support  Pt will be able to drive and report how to transfer correctly.>>A  Pt will increase strength 1 MMT grade RT LE-quad ham, PA hip abd to 3/5   Pt will walk room distances with SC without Trendelenburg>>deviations still noted but can do short distances with cane >>NA on going, gait looks better with walker

## 2023-11-08 ENCOUNTER — TREATMENT (OUTPATIENT)
Dept: PHYSICAL THERAPY | Facility: CLINIC | Age: 83
End: 2023-11-08
Payer: MEDICARE

## 2023-11-08 DIAGNOSIS — M25.551 RIGHT HIP PAIN: ICD-10-CM

## 2023-11-08 PROCEDURE — 97110 THERAPEUTIC EXERCISES: CPT | Mod: GP | Performed by: PHYSICAL THERAPIST

## 2023-11-08 NOTE — PROGRESS NOTES
Physical Therapy    Physical Therapy Treatment AND DISCHARGE    Patient Name: Mckenna Ferguson  MRN: [id number]  Today's Date: 11/8/2023  Time Calculation  Start Time: 1100  Stop Time: 1150  Time Calculation (min): 50 min  visit 4/4 cert,ends 1/11/23 20 total plus IE  Send   POC updated to MD sent note  Assessment:see goal attainment below  Pt reports walking without cane some at home with no greater pain   Pt is progressing now doing stairs step over step and less assist of cane with hand rail. Gait still requires assistive device, hip RT abductors are still weak in stance phase as well as left preexisting.  To prevent falls pt should continue to  use walker for longer distances and at night.   RT hip abduction getting stronger but still weak.    Plan   discharge to  HEP ,pt to call with questions or problems       Current Problem  1. Right hip pain  Follow Up In Physical Therapy          Rt MERCEDEZ 21 weeks p/o    Subjective   At home pt is walking with cane in house feels steady, occasionally goes without short distances. Walker used in community.  Stairs step over step up and down now- B hand rails. Pt is up to 20 min riding her recumbent bike at home  No issues post her last rx session      Precautions  RT MERCEDEZ , L fusion, foot drop LT,RA        Pain   0/10 pre, RT knee occasional no present now  Post pain rating 0/10 hip       Objective   [id number]-48.25%, 25%, close to her 21% goal    Pt wearing hearing aides [place], RT hip still dropping walking with cane   Strength-MMT:Hip abd 3/5 RT, quad 5/5,/ham 5-/5    Balance: pt can double leg stance with some sway Lt knee more flexed, stagger stance mod sway        Treatments:  Review of aarti for hep during bike  AARTI-45  supine: Rt MERCEDEZ   Recumbent bike seat back x7 L2  hook:  -hip ER bilat orange band 2 x 15  - mini bridge 2 x 15    sidelying hip abd RT with pillow between knees, 2x10    seated:  LAQ 2 x 12/3# LT/RT  sit to stand -no UE-2 x 10 raised table Rt foot slightly back  Subjective:     Patient ID: Maci Moses is a 76 y.o. female.    Chief Complaint: Foot Pain    Patient presents to clinic for a one month follow up regarding painful calluses of bilateral foot.  States the lesion to the Rt. Plantar central heel is most tender with weight bearing and alleviated with rest.  Notes being faithful with applying ebanel lotion to the affected area in conjunction with an occlusive dressing.  She has yet to consistently apply the crest pads to offload sites of callus build up to the tips of the toes.  Currently rates pain as a 10/10.  Symptoms are alleviated with rest.  Notes wearing the hoka shoes but has yet to apply the custom molded orthotics.  Denies any additional pedal complaints.      No past medical history on file.    Past Surgical History:   Procedure Laterality Date    HYSTERECTOMY      partial @ age 34     NOSE SURGERY      WRIST SURGERY         No family history on file.    Social History     Socioeconomic History    Marital status:    Tobacco Use    Smoking status: Former     Types: Cigarettes     Quit date: 2/2/2020     Years since quitting: 3.4    Smokeless tobacco: Never   Substance and Sexual Activity    Alcohol use: No    Drug use: No    Sexual activity: Never       Current Outpatient Medications   Medication Sig Dispense Refill    aspirin 162.5 mg Cp24 ASPIRIN 81 MG TABS      furosemide (LASIX) 20 MG tablet TAKE 1 TABLET EVERY DAY 90 tablet 0    potassium chloride SA (K-DUR,KLOR-CON) 20 MEQ tablet TAKE 1 TABLET (20 MEQ TOTAL) BY MOUTH ONCE DAILY. 90 tablet 1    rosuvastatin (CRESTOR) 10 MG tablet Take 1 tablet (10 mg total) by mouth once daily. 90 tablet 3     No current facility-administered medications for this visit.       Review of patient's allergies indicates:  No Known Allergies     Review of Systems   Constitutional: Negative for chills and fever.   Cardiovascular:  Positive for leg swelling. Negative for claudication.   Skin:  Positive for suspicious       stand with UE support :  toe raise 2x10 slight shift to RT, slower lower eccentric  RT ext small range 3 x 10 RT,2 x 12 lt to work Rt wt bearing  hamcurl 2 x 12/3# RT  Hip abd left to work RT side stance 2x10     no UE support-side side  wt shifts 30x   calf stretch RB 3x 20 sec   side step x8 B UE support light   step up forward 6 inch 2x10 RTx1 UE support  RT lateral step ups 4 inch 2x10 B UE support DNP  rt on block  4 step down left x15 rail and cane DNP  Forward step over-and back  jesenia yellow x10 -1 hand rail and cane ea leg  Side step over jesenia hold rail 2 hands x1 step   Walk no cane next to rail        Goals:  6 STG  Pt will verbalize MERCEDEZ precautions>>A  Pt will be I with HEP>>A  Pt will improve WOMAC scores 10% indicating improved function>>NA,ongoing  Pt will increase hip rom to 90 deg flexion>>A    LTG changed to 20  Pt will perform stairs step over step with hand rail assist (PLOF)>>>performing stairs step over step +Bilateral hand rail support  Pt will be able to drive and report how to transfer correctly.>>A  Pt will increase strength 1 MMT grade RT LE-quad ham-A, PA hip abd to 3/5   Pt will walk room distances with SC without Trendelenburg>>deviations still noted but can do short distances with cane >>NA on going, gait looks better with walker       lesions. Negative for color change and nail changes.   Musculoskeletal:  Negative for muscle cramps and muscle weakness.   Gastrointestinal:  Negative for nausea and vomiting.   Neurological:  Negative for numbness and paresthesias.      Objective:     Physical Exam  Constitutional:       Appearance: Normal appearance. She is not ill-appearing.   Cardiovascular:      Pulses:           Dorsalis pedis pulses are 2+ on the right side and 2+ on the left side.        Posterior tibial pulses are 2+ on the right side and 2+ on the left side.      Comments: CFT is < 3 seconds bilateral.  Pedal hair growth is present bilateral.  Moderate nonpitting lower extremity edema noted bilateral.  Toes are warm to touch bilateral.    Musculoskeletal:         General: Tenderness and deformity present. No signs of injury.      Right lower leg: No edema.      Left lower leg: No edema.      Comments: Muscle strength 5/5 in all muscle groups bilateral.  No tenderness nor crepitation with ROM of foot/ankle joints bilateral.  Semi-reducible contracture of toes 2-5 bilateral.  Pain with palpation to the lesion of the Lt. 3rd toe and Rt. 2nd toe and Rt. Heel porokeratosis   Skin:     General: Skin is warm.      Capillary Refill: Capillary refill takes 2 to 3 seconds.      Findings: Lesion present. No bruising, erythema, laceration, petechiae, rash or wound.      Comments: Pedal skin has normal turgor, temperature, and texture bilateral.  Toenails x 10 appear normotrophic. Porokeratosis noted to the Rt. Plantar central heel.  Corn noted to the distal tip of the Lt. 3rd toe and Rt. 2nd toe.        Neurological:      General: No focal deficit present.      Mental Status: She is alert.      Sensory: No sensory deficit.      Motor: No weakness or atrophy.      Comments: Light touch is intact bilateral.           Assessment:      Encounter Diagnoses   Name Primary?    Porokeratosis Yes    Corn or callus     Hammer toes of both feet        Plan:      Maci was seen today for foot pain.    Diagnoses and all orders for this visit:    Porokeratosis    Corn or callus    Hammer toes of both feet        I counseled the patient on her conditions, their implications and medical management.    Drastic improvement in callus build up to the Rt. Plantar heel.      With the patient's verbal consent, a sterile #15 scalpel was used to trim lesions x 3 down to smooth appearing skin without incident.  Patient tolerated this quite well.    To continue utilizing crest pads to offload the hammertoes of bilateral foot.    Advise to ambulate only in the Hoka shoes and with insoles to accommodate her flatfoot deformity.    Advised to apply Ebanel and an occlusive dressing to the porokeratosis of the Rt. Plantar heel once daily x 1 month.    RTC in 4 weeks for a follow up.    Lucien Diaz DPM       No

## 2023-12-29 DIAGNOSIS — M06.9 RHEUMATOID ARTHRITIS, UNSPECIFIED (MULTI): ICD-10-CM

## 2023-12-29 RX ORDER — LEFLUNOMIDE 10 MG/1
10 TABLET ORAL DAILY
Qty: 90 TABLET | Refills: 0 | Status: SHIPPED | OUTPATIENT
Start: 2023-12-29

## 2024-04-29 ENCOUNTER — PATIENT OUTREACH (OUTPATIENT)
Dept: CARE COORDINATION | Facility: CLINIC | Age: 84
End: 2024-04-29
Payer: MEDICARE

## 2024-04-29 NOTE — PROGRESS NOTES
Left message for patient to assist in scheduling Annual Wellness Exam.   Introduced myself as their Patient Navigator for their PCP office and how I can assist them in the future.     I asked patient to call back directly at [phone]  ; I am able to assist in scheduling Annual Wellness Exam along with any other resources they might need.  Or they may call the office [phone] to schedule there AWV.

## 2024-05-06 RX ORDER — LEVOTHYROXINE SODIUM 50 UG/1
50 TABLET ORAL DAILY
Qty: 90 TABLET | Refills: 0 | OUTPATIENT
Start: 2024-05-06

## 2024-07-19 ENCOUNTER — APPOINTMENT (OUTPATIENT)
Dept: ORTHOPEDIC SURGERY | Facility: CLINIC | Age: 84
End: 2024-07-19
Payer: MEDICARE

## 2024-08-30 PROBLEM — E03.9 HYPOTHYROIDISM, UNSPECIFIED: Status: ACTIVE | Noted: 2023-06-19

## 2024-08-30 PROBLEM — L20.9 ATOPIC DERMATITIS: Status: ACTIVE | Noted: 2023-01-10

## 2024-08-30 PROBLEM — M10.9 GOUT, UNSPECIFIED: Status: ACTIVE | Noted: 2023-06-19

## 2024-08-30 PROBLEM — M48.062 SPINAL STENOSIS, LUMBAR REGION WITH NEUROGENIC CLAUDICATION: Status: ACTIVE | Noted: 2023-06-19

## 2024-08-30 PROBLEM — I12.9 HYPERTENSIVE CHRONIC KIDNEY DISEASE W STG 1-4/UNSP CHR KDNY: Status: ACTIVE | Noted: 2023-06-19

## 2024-08-30 PROBLEM — Z96.641 PRESENCE OF RIGHT ARTIFICIAL HIP JOINT: Status: ACTIVE | Noted: 2023-06-19

## 2024-08-30 PROBLEM — M72.0 PALMAR FASCIAL FIBROMATOSIS (DUPUYTREN): Status: ACTIVE | Noted: 2023-06-19

## 2024-08-30 PROBLEM — Z91.81 HISTORY OF FALLING: Status: ACTIVE | Noted: 2023-06-19

## 2024-08-30 PROBLEM — I48.0 PAROXYSMAL ATRIAL FIBRILLATION (MULTI): Status: ACTIVE | Noted: 2023-06-19

## 2024-09-03 ENCOUNTER — OFFICE VISIT (OUTPATIENT)
Dept: CARDIOLOGY | Facility: CLINIC | Age: 84
End: 2024-09-03
Payer: MEDICARE

## 2024-09-03 VITALS
BODY MASS INDEX: 26.98 KG/M2 | HEART RATE: 57 BPM | OXYGEN SATURATION: 97 % | WEIGHT: 167.9 LBS | SYSTOLIC BLOOD PRESSURE: 155 MMHG | DIASTOLIC BLOOD PRESSURE: 83 MMHG | HEIGHT: 66 IN

## 2024-09-03 DIAGNOSIS — R60.9 EDEMA, UNSPECIFIED TYPE: ICD-10-CM

## 2024-09-03 DIAGNOSIS — I10 HYPERTENSION, UNSPECIFIED TYPE: Primary | ICD-10-CM

## 2024-09-03 DIAGNOSIS — I48.0 PAROXYSMAL ATRIAL FIBRILLATION (MULTI): ICD-10-CM

## 2024-09-03 DIAGNOSIS — R06.09 DOE (DYSPNEA ON EXERTION): ICD-10-CM

## 2024-09-03 PROCEDURE — 99214 OFFICE O/P EST MOD 30 MIN: CPT | Performed by: INTERNAL MEDICINE

## 2024-09-03 PROCEDURE — 3077F SYST BP >= 140 MM HG: CPT | Performed by: INTERNAL MEDICINE

## 2024-09-03 PROCEDURE — 3078F DIAST BP <80 MM HG: CPT | Performed by: INTERNAL MEDICINE

## 2024-09-03 PROCEDURE — 93005 ELECTROCARDIOGRAM TRACING: CPT | Performed by: INTERNAL MEDICINE

## 2024-09-03 RX ORDER — DEXTROMETHORPHAN HYDROBROMIDE, GUAIFENESIN 5; 100 MG/5ML; MG/5ML
650 LIQUID ORAL EVERY 8 HOURS PRN
COMMUNITY

## 2024-09-03 RX ORDER — SPIRONOLACTONE 25 MG/1
25 TABLET ORAL DAILY
Qty: 90 TABLET | Refills: 1 | Status: SHIPPED | OUTPATIENT
Start: 2024-09-03 | End: 2025-09-03

## 2024-09-03 ASSESSMENT — ENCOUNTER SYMPTOMS
ALTERED MENTAL STATUS: 0
VOMITING: 0
COUGH: 0
NAUSEA: 0
FEVER: 0
MEMORY LOSS: 0
HEMATURIA: 0
DIARRHEA: 0
CONSTIPATION: 0
CHILLS: 0
BLOATING: 0
DEPRESSION: 0
HEMOPTYSIS: 0
LOSS OF SENSATION IN FEET: 1
HEADACHES: 0
WHEEZING: 0
ABDOMINAL PAIN: 0
FALLS: 0
OCCASIONAL FEELINGS OF UNSTEADINESS: 1
DYSURIA: 0
MYALGIAS: 0

## 2024-09-03 ASSESSMENT — PATIENT HEALTH QUESTIONNAIRE - PHQ9
SUM OF ALL RESPONSES TO PHQ9 QUESTIONS 1 AND 2: 0
1. LITTLE INTEREST OR PLEASURE IN DOING THINGS: NOT AT ALL
2. FEELING DOWN, DEPRESSED OR HOPELESS: NOT AT ALL

## 2024-09-03 ASSESSMENT — COLUMBIA-SUICIDE SEVERITY RATING SCALE - C-SSRS
2. HAVE YOU ACTUALLY HAD ANY THOUGHTS OF KILLING YOURSELF?: NO
6. HAVE YOU EVER DONE ANYTHING, STARTED TO DO ANYTHING, OR PREPARED TO DO ANYTHING TO END YOUR LIFE?: NO
1. IN THE PAST MONTH, HAVE YOU WISHED YOU WERE DEAD OR WISHED YOU COULD GO TO SLEEP AND NOT WAKE UP?: NO

## 2024-09-03 NOTE — PROGRESS NOTES
Chief Complaint   Patient presents with    Follow-up    Atrial Fibrillation    Hypertension       HPI  85 yo WF w/ h/o parox AFIB, HTN, HLD, hypothyroid now here for cardiology f/u. No further palps. No further tachy in bed on BB.  No chest pain. No dyspnea. +long h/o occ-freq LH/dizziness No syncope. +mild dep LE edema. No claudication. No cough. +occ fatigue in PM. No insomnia/snoring.  BP at home - no longer check - prev 140s/70s, HR 70s (occ 100s)  ECG 4/21: SR (61)  ECG 4/23 (after nuc): AFIB (158), low volt, nonsp ST changes  ECG 9/24: SB (57)  ECG 9/24: SB (57)  HM 4/23: SR w/ parox AFIB 1.1% (long 1h45m), HR  (avg 52), SVT x5 (long 13b)  Echo 7/23: SR (HR ~60), EF 65%, DD, valves ok, nl IVC  PAU 8/16: no ischemia, EF 60-65% -> >75%  Nuc 4/23: no ischemia/scar, EF >65%  PFT 8/12: normal  CT ab 11/16: no AAA  LE US 12/16: no DVT     Review of Systems   Constitutional: Negative for chills, fever and malaise/fatigue.   HENT:  Negative for hearing loss.    Eyes:  Negative for visual disturbance.   Respiratory:  Negative for cough, hemoptysis and wheezing.    Skin:  Negative for rash.   Musculoskeletal:  Negative for falls and myalgias.   Gastrointestinal:  Negative for bloating, abdominal pain, constipation, diarrhea, dysphagia, nausea and vomiting.   Genitourinary:  Negative for dysuria and hematuria.   Neurological:  Negative for headaches.   Psychiatric/Behavioral:  Negative for altered mental status, depression and memory loss.       Social History     Tobacco Use   Smoking Status Never   Smokeless Tobacco Never      Family History   Problem Relation Name Age of Onset    Breast cancer Mother         Allergies   Allergen Reactions    Morphine Nausea/vomiting and Nausea Only    Gluten Protein Diarrhea    Peanut Unknown     Stomach problems    Wheat Unknown    Wheat Bran Unknown    Egg GI Upset    Egg Derived Other    Gluten Diarrhea    Milk Containing Products (Dairy) GI Upset and Other      Current  Outpatient Medications   Medication Instructions    acetaminophen (TYLENOL 8 HOUR) 650 mg, oral, Every 8 hours PRN, Do not crush, chew, or split.    allopurinol (ZYLOPRIM) 100 mg, oral, Daily    amLODIPine (NORVASC) 10 mg, oral, Daily    atenolol (TENORMIN) 25 mg, oral, Daily RT    atorvastatin (LIPITOR) 10 mg, oral, Daily, as directed    ergocalciferol (Vitamin D-2) 1.25 MG (11045 UT) capsule 1 capsule, oral, Once Weekly    famotidine (Pepcid) 40 mg tablet 1 tablet, oral, Nightly    gabapentin (NEURONTIN) 400 mg, oral, Nightly    leflunomide (ARAVA) 10 mg, oral, Daily    levothyroxine (Synthroid, Levoxyl) 75 mcg tablet 1 tablet, oral, Daily    levothyroxine (SYNTHROID, LEVOXYL) 50 mcg, oral, Daily RT    Xarelto 20 mg tablet TAKE 1 TABLET BY MOUTH DAILY TAKE WITH A FULL MEAL      Vitals:    09/03/24 1024   BP: 155/83   Pulse:    SpO2:       Physical Exam  Constitutional:       Appearance: Normal appearance.   HENT:      Head: Normocephalic and atraumatic.      Nose: Nose normal.   Neck:      Vascular: No carotid bruit.   Cardiovascular:      Rate and Rhythm: Normal rate and regular rhythm.      Heart sounds: No murmur heard.  Pulmonary:      Effort: Pulmonary effort is normal.      Breath sounds: Normal breath sounds.   Abdominal:      Palpations: Abdomen is soft.      Tenderness: There is no abdominal tenderness.   Musculoskeletal:      Right lower leg: Edema present.      Left lower leg: Edema present.      Comments: Tr-1+ pitting LE edema   Skin:     General: Skin is warm and dry.   Neurological:      General: No focal deficit present.      Mental Status: She is alert.   Psychiatric:         Mood and Affect: Mood normal.         Judgment: Judgment normal.        Results/Data  7/24 Mg 2.3, LFT nl, HGB 14.7,   8/23 TSH 3.2  6/23 Cr 0.76, K 3.3, HGB 12.8,   4/23 Cr 0.98, K 4.1, LFT nl, HGB 14.6, , TSH 5.26, FT4 1.04  3/23 CRP 0.17  3/22 LDL 80, HDL 71, , Chol 190     Assessment/Plan   84  yo WF w/ h/o parox AFIB, HTN, HLD, hypothyroid. Doing fair. No further palps. BP needs better control. Try adding Valeriy (to also help edema and raise K).  Check BP at home and notify if elevated.  Low cardiac risk for hip surgery -> proceed as indicated (can hold Xarelto x 2 days preop)  -continue Xarelto 20 qd  -continue Atenolol 25 every day (move to PM to see if fatigue less)  -continue Amlodipine 10 every day (if BP good, consider reduce since may be causing some edema - denis if BNP normal)  -add [place] 25 every day -> check Cr/K in 1-2 weeks  -continue Atorva 10 qd  -try to maintain euthyroid  -f/u 3 months (earlier if needed)     John Park MD

## 2024-09-03 NOTE — PATIENT INSTRUCTIONS
Add Spironolactone 25mg 1x/day    Take all Atenolol in PM    Check non-fasting lab in 1-2 weeks after starting Spironolactone    Check BP at home  Goal BP at least <140/90  If too high, let me know ([phone] or mychart)

## 2024-09-17 ENCOUNTER — LAB (OUTPATIENT)
Dept: LAB | Facility: LAB | Age: 84
End: 2024-09-17
Payer: MEDICARE

## 2024-09-17 DIAGNOSIS — R60.9 EDEMA, UNSPECIFIED TYPE: ICD-10-CM

## 2024-09-17 DIAGNOSIS — R06.09 DOE (DYSPNEA ON EXERTION): ICD-10-CM

## 2024-09-17 DIAGNOSIS — I10 HYPERTENSION, UNSPECIFIED TYPE: ICD-10-CM

## 2024-09-17 LAB — BNP SERPL-MCNC: 43 PG/ML (ref 0–99)

## 2024-09-17 PROCEDURE — 84443 ASSAY THYROID STIM HORMONE: CPT

## 2024-09-17 PROCEDURE — 36415 COLL VENOUS BLD VENIPUNCTURE: CPT

## 2024-09-17 PROCEDURE — 83735 ASSAY OF MAGNESIUM: CPT

## 2024-09-17 PROCEDURE — 80048 BASIC METABOLIC PNL TOTAL CA: CPT

## 2024-09-17 PROCEDURE — 83880 ASSAY OF NATRIURETIC PEPTIDE: CPT

## 2024-09-17 PROCEDURE — 84439 ASSAY OF FREE THYROXINE: CPT

## 2024-09-18 LAB
ANION GAP SERPL CALC-SCNC: 16 MMOL/L (ref 10–20)
BUN SERPL-MCNC: 17 MG/DL (ref 6–23)
CALCIUM SERPL-MCNC: 10.2 MG/DL (ref 8.6–10.6)
CHLORIDE SERPL-SCNC: 106 MMOL/L (ref 98–107)
CO2 SERPL-SCNC: 27 MMOL/L (ref 21–32)
CREAT SERPL-MCNC: 0.9 MG/DL (ref 0.5–1.05)
EGFRCR SERPLBLD CKD-EPI 2021: 63 ML/MIN/1.73M*2
GLUCOSE SERPL-MCNC: 101 MG/DL (ref 74–99)
MAGNESIUM SERPL-MCNC: 2.38 MG/DL (ref 1.6–2.4)
POTASSIUM SERPL-SCNC: 4.5 MMOL/L (ref 3.5–5.3)
SODIUM SERPL-SCNC: 144 MMOL/L (ref 136–145)
T4 FREE SERPL-MCNC: 1.34 NG/DL (ref 0.78–1.48)
TSH SERPL-ACNC: 4.53 MIU/L (ref 0.44–3.98)

## 2024-10-07 NOTE — PROGRESS NOTES
Pharmacist Clinic: Cardiology Management    Mckenna Ferguson is a 84 y.o. female was referred to Clinical Pharmacy Team for Paroxysmal atrial fibrillation  management.     Referring Provider: John Park MD    THIS IS A NEW PATIENT APPOINTMENT. PATIENT WILL BE ESTABLISHING CARE WITH CLINICAL PHARMACY.    Appointment was completed by Mckenna Ferguson who was reached at primary number.    REVIEW OF PAST APPNT (IF APPLICABLE):   N/A    Allergies Reviewed? Yes    Allergies   Allergen Reactions    Morphine Nausea/vomiting and Nausea Only    Gluten Protein Diarrhea    Wheat Unknown    Wheat Bran Unknown    Egg GI Upset    Egg Derived Other    Gluten Diarrhea    Milk Containing Products (Dairy) GI Upset and Other    Peanut Other and GI Upset     Stomach problems       Past Medical History:   Diagnosis Date    Asymptomatic menopausal state 12/03/2020    Asymptomatic menopausal state    Cellulitis, unspecified 11/30/2016    Cellulitis    Cervicalgia 08/09/2021    Neck pain    Disorder of white blood cells, unspecified 07/25/2016    Neutrophilia    Dizziness and giddiness 03/11/2022    Dizziness    Dysphagia, unspecified 04/13/2022    Dysphagia    Encounter for general adult medical examination without abnormal findings 03/11/2022    Encounter for Medicare annual wellness exam    Encounter for gynecological examination (general) (routine) without abnormal findings 10/20/2022    Visit for gynecologic examination    Encounter for immunization 12/03/2019    Need for pneumococcal vaccination    Encounter for other screening for malignant neoplasm of breast 03/11/2022    Breast screening    Encounter for screening for malignant neoplasm of colon 09/27/2022    Colon cancer screening    Essential (primary) hypertension 03/11/2022    Hypertension    Fever, unspecified 12/16/2015    Fever    Foot drop, unspecified foot 12/16/2022    Foot-drop    Furuncle, unspecified 01/24/2020    Boil    Headache, unspecified 08/09/2021    Head  pain    Hypothyroidism, unspecified 06/13/2022    Hypothyroidism    Insomnia, unspecified 12/03/2019    Insomnia    Otalgia, unspecified ear 08/09/2021    Posterior auricular pain    Other abnormality of red blood cells 06/13/2022    Elevated MCV    Other fatigue 06/13/2022    Fatigue    Pain in leg, unspecified 12/03/2019    Chronic leg pain    Pain in right hand 07/10/2017    Pain in both hands    Pain in right knee 12/23/2022    Right knee pain    Pain in right thigh 12/16/2022    Pain of right thigh    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/05/2017    Dupuytren contracture    Personal history of other diseases of the circulatory system 09/14/2015    History of hypertension    Personal history of other endocrine, nutritional and metabolic disease 09/12/2013    History of hypothyroidism    Postmenopausal atrophic vaginitis 10/20/2022    Vaginal atrophy    Presence of intraocular lens 09/28/2015    Pseudophakia of left eye    Presence of intraocular lens 09/28/2015    Pseudophakia of left eye    Presence of intraocular lens 09/28/2015    Pseudophakia of left eye    Rash and other nonspecific skin eruption 08/13/2018    Rash    Right lower quadrant pain 12/16/2015    Abdominal pain, RLQ (right lower quadrant)    Right upper quadrant pain 09/10/2018    Abdominal pain, RUQ (right upper quadrant)    Unspecified blepharitis left eye, unspecified eyelid 09/28/2015    Blepharitis of left eye    Unspecified blepharitis left eye, unspecified eyelid 09/28/2015    Blepharitis of left eye    Unspecified blepharitis left eye, unspecified eyelid 10/26/2015    Blepharitis of left eye    Unspecified fall, initial encounter 12/16/2022    Fall at home    Unspecified hearing loss, unspecified ear 09/05/2017    Change in hearing    Vitamin D deficiency, unspecified 06/13/2022    Vitamin D deficiency    Zoster without complications 06/13/2022    Shingles       Current Outpatient Medications on File Prior to Visit   Medication Sig  Dispense Refill    acetaminophen (Tylenol 8 HOUR) 650 mg ER tablet Take 1 tablet (650 mg) by mouth every 8 hours if needed for mild pain (1 - 3). Do not crush, chew, or split.      allopurinol (Zyloprim) 100 mg tablet Take 1 tablet (100 mg) by mouth once daily.      amLODIPine (Norvasc) 10 mg tablet TAKE 1 TABLET DAILY 90 tablet 3    atenolol (Tenormin) 25 mg tablet TAKE 1 TABLET DAILY 90 tablet 3    atorvastatin (Lipitor) 10 mg tablet TAKE 1 TABLET BY MOUTH EVERY DAY AS DIRECTED 90 tablet 3    ergocalciferol (Vitamin D-2) 1.25 MG (14860 UT) capsule Take 1 capsule (50,000 Units) by mouth every 30 (thirty) days.      levothyroxine (Synthroid, Levoxyl) 50 mcg tablet Take 1 tablet (50 mcg) by mouth once daily (M-F).      levothyroxine (Synthroid, Levoxyl) 75 mcg tablet Take 1 tablet (75 mcg) by mouth. saturday and sunday      spironolactone (Aldactone) 25 mg tablet Take 1 tablet (25 mg) by mouth once daily. 90 tablet 1    Xarelto 20 mg tablet TAKE 1 TABLET BY MOUTH DAILY TAKE WITH A FULL MEAL 90 tablet 3    gabapentin (Neurontin) 400 mg capsule Take 1 capsule (400 mg) by mouth once daily at bedtime.      [DISCONTINUED] atenolol (Tenormin) 25 mg tablet Take 1 tablet (25 mg) by mouth once daily.      [DISCONTINUED] famotidine (Pepcid) 40 mg tablet Take 1 tablet (40 mg) by mouth once daily at bedtime.       No current facility-administered medications on file prior to visit.         RELEVANT LAB RESULTS  Lab Results   Component Value Date    BILITOT 0.5 04/20/2023    CALCIUM 10.2 09/17/2024    CO2 27 09/17/2024     09/17/2024    CREATININE 0.90 09/17/2024    GLUCOSE 101 (H) 09/17/2024    ALKPHOS 89 04/20/2023    K 4.5 09/17/2024    PROT 7.1 04/20/2023     09/17/2024    AST 17 04/20/2023    ALT 16 04/20/2023    BUN 17 09/17/2024    ANIONGAP 16 09/17/2024    MG 2.38 09/17/2024    ALBUMIN 4.6 04/20/2023    GFRF CANCELED 06/01/2023    GFRMALE CANCELED 06/01/2023     Lab Results   Component Value Date    TRIG 196  "(H) 2022    CHOL 190 2022    HDL 70.8 2022     No results found for: \"BMCBC\", \"CBCDIF\"     PHARMACEUTICAL ASSESSMENT    MEDICATION RECONCILIATION    Home Pharmacy Reviewed? Yes, describe: [facility] [address] [place]    Changed:  Ergocalciferol 1.25 mg - once monthly  Levothyroxine 50 mcg - 1 tablet -   Levothyroxine 75 mcg tablet - 1 tablet Saturday and    Removed:  Famotidine 40 mg    Drug Interactions? No    Medication Documentation Review Audit       Reviewed by Christin Montanez formerly [facility] (Pharmacist) on 10/08/24 at 1323      Medication Order Taking? Sig Documenting Provider Last Dose Status   acetaminophen (Tylenol 8 HOUR) 650 mg ER tablet [id number] Yes Take 1 tablet (650 mg) by mouth every 8 hours if needed for mild pain (1 - 3). Do not crush, chew, or split. Ryley Shane MD Taking Active   allopurinol (Zyloprim) 100 mg tablet [id number] Yes Take 1 tablet (100 mg) by mouth once daily. Ryley Shane MD Taking Differently Active   amLODIPine (Norvasc) 10 mg tablet [id number] Yes TAKE 1 TABLET DAILY John Park MD Taking Active     Discontinued 10/07/24 0822   atenolol (Tenormin) 25 mg tablet [id number] Yes TAKE 1 TABLET DAILY John Park MD Taking Active   atorvastatin (Lipitor) 10 mg tablet [id number] Yes TAKE 1 TABLET BY MOUTH EVERY DAY AS DIRECTED John Park MD Taking Active   ergocalciferol (Vitamin D-2) 1.25 MG (60345 UT) capsule [id number] Yes Take 1 capsule (50,000 Units) by mouth every 30 (thirty) days. Ryley Shane MD Taking Differently Active   Discontinued 10/08/24 1322   gabapentin (Neurontin) 400 mg capsule [id number]  Take 1 capsule (400 mg) by mouth once daily at bedtime. Ryley Shane MD   24 2359   levothyroxine (Synthroid, Levoxyl) 50 mcg tablet [id number] Yes Take 1 tablet (50 mcg) by mouth once daily (M-). Ryley Shane MD Taking Differently Active   levothyroxine (Synthroid, Levoxyl) 75 mcg tablet [id number] Yes Take 1 tablet (75 " mcg) by mouth. saturday and sunday Historical Provider, MD Taking Differently Active   spironolactone (Aldactone) 25 mg tablet [id number] Yes Take 1 tablet (25 mg) by mouth once daily. John Park MD Taking Active   Xarelto 20 mg tablet [id number] Yes TAKE 1 TABLET BY MOUTH DAILY TAKE WITH A FULL MEAL John Park MD Taking Active                    DISEASE MANAGEMENT ASSESSMENT:     ANTICOAGULATION ASSESSMENT    The ASCVD Risk score (Jaya DK, et al., 2019) failed to calculate for the following reasons:    The 2019 ASCVD risk score is only valid for ages 40 to 79    DIAGNOSIS: prevention of nonvalvular atrial fibrilliation stroke and systemic embolism  - Patient is projected to be on anticoagulation indefinitely  - [id number] Score: [4] (only included if diagnosis is atrial fibrillation)   Age: [<65 (0)] [65-74 (+1)] [> 75 (+2)]: 2  Sex: [Male/Female (+1)]: 1  CHF history: [No/Yes(+1)]: 0  Hypertension history: [No/Yes(+1)]: 1  Stroke/TIA/thromboembolism history: [No/Yes(+2)]: 0  Vascular disease history (prior MI, peripheral artery disease, aortic plaque): [No/Yes(+1)]: 0  Diabetes history: [No/Yes(+1)]: 0    CURRENT PHARMACOTHERAPY:    Xarelto 20 mg daily  CrCl 55 mL/ min (actual body weight)    RELEVANT PAST MEDICAL HISTORY:   HTN. HLD. Afib    Affordability/Accessibility: Pt report Xarelto is too expensive.  PAP screening.   Adherence/Organization: no issues with adherence.  Adverse Reactions: No unknown bruising or bleeds. Pt report tolerating medication well.  Recent Hospitalizations: No  Recent Falls/Trauma: No  Changes in Tobacco or Alcohol Intake:   Tobacco: No  Alcohol: No    EDUCATION/COUNSELING:   - Counseled patient on MOA, expectations, duration of therapy, contraindications, administration, and monitoring parameters  - Counseled patient of side effects that are indicative of bleeding such as dark tarry stool, unexplainable bruising, or vomiting up a coffee ground like substance      Patient Assistance Program (PAP)    Application for program to be submitted for the following medications: [facility] Permanent Address: [facility]   Prescription Insurance:   Yes   Members of Household: 1   Files Taxes: Yes       Unfortunately, at this time, Mckenna Ferguson is ineligible to receive financial assistance through  Patient Assistance Program because income is > 400% FPL .      DISCUSSION/NOTES:   Pt report doing well on her Xarelto with no unknown bruising or bleeding that does not stop. Pt report adherence to all her medications.   Screened patient for  PAP, patient income is > 400% FPL, made her ineligible for the program. Patient stated that she thought she made too much, but wanted to give it a try. Patient report that she plan to continue to use her investments to pay for her medications, which is something she felt that it is important to her. Encourage patient to reach out to Dr. Park for another referral to the Clinical Pharmacy Team in the new year if she felt like she meet the FPL's criteria then.   Follow up as needed per cardiologist or patient.      ASSESSMENT AND PLAN:    Assessment/Plan   Problem List Items Addressed This Visit       Paroxysmal atrial fibrillation (Multi)     Patient is doing well on Xarelto 20 mg daily. No dose adjustment needed (CrCl 55 mL/min)              RECOMMENDATIONS/PLAN    Continue  Xarelto 20 mg daily  CrCl 55 mL/ min    Last Appnt with Referring Provider: 9/3/24  Next Appnt with Referring Provider: 12/5/2024  Clinical Pharmacist follow up: None  VAF/Application Expiration: No  Type of Encounter: Ivania Montanez [facility] MED - PGY1 Resident    Verbal consent to manage patient's drug therapy was obtained from the patient . They were informed they may decline to participate or withdraw from participation in pharmacy services at any time.    Continue all meds under the continuation of care with the referring provider and clinical  pharmacy team.

## 2024-10-08 ENCOUNTER — APPOINTMENT (OUTPATIENT)
Dept: PHARMACY | Facility: HOSPITAL | Age: 84
End: 2024-10-08
Payer: MEDICARE

## 2024-10-08 DIAGNOSIS — I48.0 PAROXYSMAL ATRIAL FIBRILLATION (MULTI): ICD-10-CM

## 2024-10-08 NOTE — PROGRESS NOTES
I reviewed the progress note and agree with the resident's findings and plans as written. Case discussed with resident.    Pan Marlow  [phone]

## 2024-10-17 ENCOUNTER — APPOINTMENT (OUTPATIENT)
Dept: ORTHOPEDIC SURGERY | Facility: CLINIC | Age: 84
End: 2024-10-17
Payer: MEDICARE

## 2024-12-05 ENCOUNTER — APPOINTMENT (OUTPATIENT)
Dept: CARDIOLOGY | Facility: CLINIC | Age: 84
End: 2024-12-05
Payer: MEDICARE

## 2024-12-12 ENCOUNTER — OFFICE VISIT (OUTPATIENT)
Dept: ORTHOPEDIC SURGERY | Facility: CLINIC | Age: 84
End: 2024-12-12
Payer: MEDICARE

## 2024-12-12 ENCOUNTER — HOSPITAL ENCOUNTER (OUTPATIENT)
Dept: RADIOLOGY | Facility: CLINIC | Age: 84
Discharge: HOME | End: 2024-12-12
Payer: MEDICARE

## 2024-12-12 DIAGNOSIS — M16.12 PRIMARY OSTEOARTHRITIS OF LEFT HIP: Primary | ICD-10-CM

## 2024-12-12 DIAGNOSIS — M25.552 LEFT HIP PAIN: ICD-10-CM

## 2024-12-12 PROCEDURE — 1159F MED LIST DOCD IN RCRD: CPT | Performed by: ORTHOPAEDIC SURGERY

## 2024-12-12 PROCEDURE — 1036F TOBACCO NON-USER: CPT | Performed by: ORTHOPAEDIC SURGERY

## 2024-12-12 PROCEDURE — 99213 OFFICE O/P EST LOW 20 MIN: CPT | Performed by: ORTHOPAEDIC SURGERY

## 2024-12-12 PROCEDURE — 73502 X-RAY EXAM HIP UNI 2-3 VIEWS: CPT | Mod: LT

## 2024-12-12 NOTE — PROGRESS NOTES
Patient is a 84-year-old female presents today for evaluation of left hip osteoarthritis.  She previously underwent right total hip replacement and is done very well from that standpoint.  She takes occasional Tylenol.  She uses a walker.  She has a history of a previous lumbar instrumented fusion.  She wears a AFO on her left foot secondary to foot drop from her spine surgery.  She rates her pain at times is 8 out of 10.    Left hip:  AAOx3, NAD, walks with a significant antalgic gait  Moderate pain with flexion internal rotation  Positive Stincfield  Mild TTP over trochanter laterally  5/5 Hip flexion/knee extension//pf, 0 out of 5 dorsiflexion  SILT in a anastacio/saph/per/tib distribution  ½ dorsalis pedis and posterior tibial pulse  no popliteal or inguinal lymphadenopathy  no other overlying lesions  mood: euthymic  Respirations non labored    Plain films were reviewed by myself clinic today.  She has advanced osteoarthritis of her left hip with complete loss of joint space, underlying sclerosis and bipolar osteophytic change.    We discussed further conservative treatment versus surgery with her today in clinic.  There is no further conservative treatment is warranted.  She has bone-on-bone osteoarthritis of her left hip and is disabled by this at this time.  She would like to proceed with total hip arthroplasty.  She is give my office card and number can call to schedule surgery at her convenience.    M16.12  [id number]  [facility]  Overnight  [place], dual mobility    This note was created using voice recognition software and was not corrected for typographical or grammatical errors.

## 2025-01-02 ENCOUNTER — OFFICE VISIT (OUTPATIENT)
Dept: CARDIOLOGY | Facility: CLINIC | Age: 85
End: 2025-01-02
Payer: MEDICARE

## 2025-01-02 VITALS
HEART RATE: 95 BPM | WEIGHT: 169 LBS | RESPIRATION RATE: 18 BRPM | OXYGEN SATURATION: 99 % | SYSTOLIC BLOOD PRESSURE: 125 MMHG | BODY MASS INDEX: 27.16 KG/M2 | DIASTOLIC BLOOD PRESSURE: 81 MMHG | HEIGHT: 66 IN

## 2025-01-02 DIAGNOSIS — I10 HYPERTENSION, UNSPECIFIED TYPE: ICD-10-CM

## 2025-01-02 DIAGNOSIS — I48.0 PAROXYSMAL ATRIAL FIBRILLATION (MULTI): Primary | ICD-10-CM

## 2025-01-02 DIAGNOSIS — R60.9 EDEMA, UNSPECIFIED TYPE: ICD-10-CM

## 2025-01-02 PROCEDURE — 99214 OFFICE O/P EST MOD 30 MIN: CPT | Performed by: INTERNAL MEDICINE

## 2025-01-02 PROCEDURE — 3079F DIAST BP 80-89 MM HG: CPT | Performed by: INTERNAL MEDICINE

## 2025-01-02 PROCEDURE — 3074F SYST BP LT 130 MM HG: CPT | Performed by: INTERNAL MEDICINE

## 2025-01-02 PROCEDURE — 1036F TOBACCO NON-USER: CPT | Performed by: INTERNAL MEDICINE

## 2025-01-02 PROCEDURE — 1159F MED LIST DOCD IN RCRD: CPT | Performed by: INTERNAL MEDICINE

## 2025-01-02 RX ORDER — SPIRONOLACTONE 25 MG/1
25 TABLET ORAL DAILY
Qty: 90 TABLET | Refills: 3 | Status: SHIPPED | OUTPATIENT
Start: 2025-01-02 | End: 2026-01-02

## 2025-01-02 ASSESSMENT — ENCOUNTER SYMPTOMS
CHILLS: 0
DYSURIA: 0
MYALGIAS: 0
HEMATURIA: 0
HEMOPTYSIS: 0
HEADACHES: 0
MEMORY LOSS: 0
VOMITING: 0
FEVER: 0
FALLS: 0
DEPRESSION: 0
DIARRHEA: 0
COUGH: 0
NAUSEA: 0
ALTERED MENTAL STATUS: 0
ABDOMINAL PAIN: 0
CONSTIPATION: 0
WHEEZING: 0
BLOATING: 0

## 2025-01-02 ASSESSMENT — PATIENT HEALTH QUESTIONNAIRE - PHQ9
2. FEELING DOWN, DEPRESSED OR HOPELESS: NOT AT ALL
SUM OF ALL RESPONSES TO PHQ9 QUESTIONS 1 AND 2: 0
1. LITTLE INTEREST OR PLEASURE IN DOING THINGS: NOT AT ALL

## 2025-01-02 NOTE — PROGRESS NOTES
Chief Complaint   Patient presents with    Follow-up     Check on spironaldactone       HPI  83 yo WF w/ h/o parox AFIB, HTN, HLD, hypothyroid now here for cardiology f/u. No further palps. No further tachy in bed on BB.  No chest pain. No dyspnea. +long h/o occ LH/dizziness No syncope. No further edema on [place]. No claudication. No cough. +occ fatigue in PM; less since BB moved to PM. No insomnia/snoring.  BP at home: 120s/70s, HR 70s (rare 100s)  ECG 4/21: SR (61)  ECG 4/23 (after nuc): AFIB (158), low volt, nonsp ST changes  ECG 9/24: SB (57)  ECG 9/24: SB (57)  HM 4/23: SR w/ parox AFIB 1.1% (long 1h45m), HR  (avg 52), SVT x5 (long 13b)  Echo 7/23: SR (HR ~60), EF 65%, DD, valves ok, nl IVC  PAU 8/16: no ischemia, EF 60-65% -> >75%  Nuc 4/23: no ischemia/scar, EF >65%  PFT 8/12: normal  CT ab 11/16: no AAA  LE US 12/16: no DVT     Review of Systems   Constitutional: Negative for chills, fever and malaise/fatigue.   HENT:  Negative for hearing loss.    Eyes:  Negative for visual disturbance.   Respiratory:  Negative for cough, hemoptysis and wheezing.    Skin:  Negative for rash.   Musculoskeletal:  Negative for falls and myalgias.   Gastrointestinal:  Negative for bloating, abdominal pain, constipation, diarrhea, dysphagia, nausea and vomiting.   Genitourinary:  Negative for dysuria and hematuria.   Neurological:  Negative for headaches.   Psychiatric/Behavioral:  Negative for altered mental status, depression and memory loss.       Social History     Tobacco Use   Smoking Status Never   Smokeless Tobacco Never      Family History   Problem Relation Name Age of Onset    Breast cancer Mother         Allergies   Allergen Reactions    Morphine Nausea/vomiting and Nausea Only    Gluten Protein Diarrhea    Wheat Unknown    Wheat Bran Unknown    Egg GI Upset    Egg Derived Other    Gluten Diarrhea    Milk Containing Products (Dairy) GI Upset and Other    Peanut Other and GI Upset     Stomach problems     "  Current Outpatient Medications   Medication Instructions    acetaminophen (TYLENOL 8 HOUR) 650 mg, Every 8 hours PRN    allopurinol (ZYLOPRIM) 100 mg, Daily    amLODIPine (NORVASC) 10 mg, oral, Daily    atenolol (TENORMIN) 25 mg, oral, Daily    atorvastatin (LIPITOR) 10 mg, oral, Daily, as directed    ergocalciferol (VITAMIN D-2) 50,000 Units, Every 30 days    gabapentin (NEURONTIN) 400 mg, Nightly    levothyroxine (Synthroid, Levoxyl) 75 mcg tablet 1 tablet    levothyroxine (SYNTHROID, LEVOXYL) 50 mcg, Once daily (morning) M-F (5 days a week)    spironolactone (ALDACTONE) 25 mg, oral, Daily    Xarelto 20 mg tablet TAKE 1 TABLET BY MOUTH DAILY TAKE WITH A FULL MEAL      /81 (BP Location: Right arm)   Pulse 95   Resp 18   Ht 1.676 m (5' 6\")   Wt 76.7 kg (169 lb)   SpO2 99%   BMI 27.28 kg/m²       Physical Exam  Constitutional:       Appearance: Normal appearance.   HENT:      Head: Normocephalic and atraumatic.      Nose: Nose normal.   Neck:      Vascular: No carotid bruit.   Cardiovascular:      Rate and Rhythm: Normal rate and regular rhythm.      Heart sounds: No murmur heard.  Pulmonary:      Effort: Pulmonary effort is normal.      Breath sounds: Normal breath sounds.   Abdominal:      Palpations: Abdomen is soft.      Tenderness: There is no abdominal tenderness.   Musculoskeletal:      Right lower leg: No edema.      Left lower leg: No edema.   Skin:     General: Skin is warm and dry.   Neurological:      General: No focal deficit present.      Mental Status: She is alert.   Psychiatric:         Mood and Affect: Mood normal.         Judgment: Judgment normal.        Results/Data  9/24 Cr 0.9, K 4.5, Mg 2.38, TSH 4.53, FT4 1.34, BNP 43  7/24 Mg 2.3, LFT nl, HGB 14.7,   8/23 TSH 3.2  6/23 Cr 0.76, K 3.3, HGB 12.8,   4/23 Cr 0.98, K 4.1, LFT nl, HGB 14.6, , TSH 5.26, FT4 1.04  3/23 CRP 0.17  3/22 LDL 80, HDL 71, , Chol 190     Assessment/Plan   83 yo WF w/ h/o parox AFIB, " HTN, HLD, hypothyroid. Doing well. No further palps.   Low cardiac risk for hip surgery -> proceed as indicated (can hold Xarelto x 2-3 days preop)  -continue Xarelto 20 qd  -continue Atenolol 25 every day (PM)  -continue Amlodipine 10 every day  -continue Valeriy 25 every day  -continue Atorva 10 qd  -try to maintain euthyroid  -f/u 6 months (earlier if needed)     John Park MD

## 2025-01-23 ENCOUNTER — APPOINTMENT (OUTPATIENT)
Dept: ORTHOPEDIC SURGERY | Facility: CLINIC | Age: 85
End: 2025-01-23
Payer: MEDICARE

## 2025-01-23 PROBLEM — M16.12 UNILATERAL PRIMARY OSTEOARTHRITIS, LEFT HIP: Status: ACTIVE | Noted: 2025-01-23

## 2025-01-24 ENCOUNTER — HOSPITAL ENCOUNTER (OUTPATIENT)
Dept: RADIOLOGY | Facility: HOSPITAL | Age: 85
Discharge: HOME | End: 2025-01-24
Payer: MEDICARE

## 2025-01-24 ENCOUNTER — EDUCATION (OUTPATIENT)
Dept: ORTHOPEDIC SURGERY | Facility: HOSPITAL | Age: 85
End: 2025-01-24
Payer: MEDICARE

## 2025-01-24 ENCOUNTER — PRE-ADMISSION TESTING (OUTPATIENT)
Dept: PREADMISSION TESTING | Facility: HOSPITAL | Age: 85
End: 2025-01-24
Payer: MEDICARE

## 2025-01-24 VITALS
HEIGHT: 66 IN | DIASTOLIC BLOOD PRESSURE: 71 MMHG | OXYGEN SATURATION: 98 % | HEART RATE: 56 BPM | TEMPERATURE: 98.6 F | BODY MASS INDEX: 27.16 KG/M2 | SYSTOLIC BLOOD PRESSURE: 144 MMHG | WEIGHT: 169 LBS | RESPIRATION RATE: 16 BRPM

## 2025-01-24 DIAGNOSIS — M16.12 UNILATERAL PRIMARY OSTEOARTHRITIS, LEFT HIP: ICD-10-CM

## 2025-01-24 DIAGNOSIS — R73.09 ELEVATED GLUCOSE: ICD-10-CM

## 2025-01-24 DIAGNOSIS — Z01.818 PREOP TESTING: Primary | ICD-10-CM

## 2025-01-24 LAB
ALBUMIN SERPL BCP-MCNC: 4.8 G/DL (ref 3.4–5)
ALP SERPL-CCNC: 61 U/L (ref 33–136)
ALT SERPL W P-5'-P-CCNC: 11 U/L (ref 7–45)
ANION GAP SERPL CALC-SCNC: 14 MMOL/L (ref 10–20)
AST SERPL W P-5'-P-CCNC: 15 U/L (ref 9–39)
ATRIAL RATE: 52 BPM
BILIRUB SERPL-MCNC: 0.4 MG/DL (ref 0–1.2)
BUN SERPL-MCNC: 28 MG/DL (ref 6–23)
CALCIUM SERPL-MCNC: 11.1 MG/DL (ref 8.6–10.3)
CHLORIDE SERPL-SCNC: 104 MMOL/L (ref 98–107)
CO2 SERPL-SCNC: 28 MMOL/L (ref 21–32)
CREAT SERPL-MCNC: 0.93 MG/DL (ref 0.5–1.05)
EGFRCR SERPLBLD CKD-EPI 2021: 60 ML/MIN/1.73M*2
ERYTHROCYTE [DISTWIDTH] IN BLOOD BY AUTOMATED COUNT: 12.7 % (ref 11.5–14.5)
GLUCOSE SERPL-MCNC: 102 MG/DL (ref 74–99)
HCT VFR BLD AUTO: 46 % (ref 36–46)
HGB BLD-MCNC: 14.6 G/DL (ref 12–16)
MCH RBC QN AUTO: 31.5 PG (ref 26–34)
MCHC RBC AUTO-ENTMCNC: 31.7 G/DL (ref 32–36)
MCV RBC AUTO: 99 FL (ref 80–100)
NRBC BLD-RTO: ABNORMAL /100{WBCS}
P AXIS: 46 DEGREES
P OFFSET: 190 MS
P ONSET: 125 MS
PLATELET # BLD AUTO: 260 X10*3/UL (ref 150–450)
POTASSIUM SERPL-SCNC: 4.1 MMOL/L (ref 3.5–5.3)
PR INTERVAL: 192 MS
PROT SERPL-MCNC: 8.1 G/DL (ref 6.4–8.2)
Q ONSET: 221 MS
QRS COUNT: 9 BEATS
QRS DURATION: 90 MS
QT INTERVAL: 440 MS
QTC CALCULATION(BAZETT): 409 MS
QTC FREDERICIA: 419 MS
R AXIS: 3 DEGREES
RBC # BLD AUTO: 4.64 X10*6/UL (ref 4–5.2)
SODIUM SERPL-SCNC: 142 MMOL/L (ref 136–145)
T AXIS: 42 DEGREES
T OFFSET: 441 MS
VENTRICULAR RATE: 52 BPM
WBC # BLD AUTO: 7.4 X10*3/UL (ref 4.4–11.3)

## 2025-01-24 PROCEDURE — 93005 ELECTROCARDIOGRAM TRACING: CPT

## 2025-01-24 PROCEDURE — 85027 COMPLETE CBC AUTOMATED: CPT

## 2025-01-24 PROCEDURE — 73502 X-RAY EXAM HIP UNI 2-3 VIEWS: CPT | Mod: LT

## 2025-01-24 PROCEDURE — 36415 COLL VENOUS BLD VENIPUNCTURE: CPT

## 2025-01-24 PROCEDURE — 83036 HEMOGLOBIN GLYCOSYLATED A1C: CPT | Mod: BEALAB

## 2025-01-24 PROCEDURE — 80053 COMPREHEN METABOLIC PANEL: CPT

## 2025-01-24 PROCEDURE — 93010 ELECTROCARDIOGRAM REPORT: CPT | Performed by: INTERNAL MEDICINE

## 2025-01-24 PROCEDURE — 99204 OFFICE O/P NEW MOD 45 MIN: CPT | Performed by: PHYSICIAN ASSISTANT

## 2025-01-24 PROCEDURE — 87081 CULTURE SCREEN ONLY: CPT | Mod: BEALAB

## 2025-01-24 RX ORDER — CHLORHEXIDINE GLUCONATE ORAL RINSE 1.2 MG/ML
SOLUTION DENTAL
Qty: 473 ML | Refills: 0 | Status: SHIPPED | OUTPATIENT
Start: 2025-01-24 | End: 2025-01-30 | Stop reason: HOSPADM

## 2025-01-24 RX ORDER — BISMUTH SUBSALICYLATE 262 MG
1 TABLET,CHEWABLE ORAL DAILY
COMMUNITY

## 2025-01-24 RX ORDER — CHLORHEXIDINE GLUCONATE 40 MG/ML
SOLUTION TOPICAL DAILY
Qty: 470 ML | Refills: 0 | Status: SHIPPED | OUTPATIENT
Start: 2025-01-24 | End: 2025-01-30 | Stop reason: HOSPADM

## 2025-01-24 ASSESSMENT — ENCOUNTER SYMPTOMS
CONSTITUTIONAL NEGATIVE: 1
NECK NEGATIVE: 1
CARDIOVASCULAR NEGATIVE: 1
GASTROINTESTINAL NEGATIVE: 1
ENDOCRINE NEGATIVE: 1
EYES NEGATIVE: 1
NEUROLOGICAL NEGATIVE: 1
MUSCULOSKELETAL NEGATIVE: 1
RESPIRATORY NEGATIVE: 1

## 2025-01-24 ASSESSMENT — DUKE ACTIVITY SCORE INDEX (DASI)
CAN YOU PARTICIPATE IN MODERATE RECREATIONAL ACTIVITIES LIKE GOLF, BOWLING, DANCING, DOUBLES TENNIS OR THROWING A BASEBALL OR FOOTBALL: NO
DASI METS SCORE: 4.6
CAN YOU WALK INDOORS, SUCH AS AROUND YOUR HOUSE: YES
CAN YOU DO MODERATE WORK AROUND THE HOUSE LIKE VACUUMING, SWEEPING FLOORS OR CARRYING GROCERIES: NO
CAN YOU HAVE SEXUAL RELATIONS: NO
CAN YOU PARTICIPATE IN STRENOUS SPORTS LIKE SWIMMING, SINGLES TENNIS, FOOTBALL, BASKETBALL, OR SKIING: NO
CAN YOU DO LIGHT WORK AROUND THE HOUSE LIKE DUSTING OR WASHING DISHES: YES
CAN YOU TAKE CARE OF YOURSELF (EAT, DRESS, BATHE, OR USE TOILET): YES
CAN YOU RUN A SHORT DISTANCE: NO
TOTAL_SCORE: 15.45
CAN YOU CLIMB A FLIGHT OF STAIRS OR WALK UP A HILL: YES
CAN YOU DO HEAVY WORK AROUND THE HOUSE LIKE SCRUBBING FLOORS OR LIFTING AND MOVING HEAVY FURNITURE: NO
CAN YOU WALK A BLOCK OR TWO ON LEVEL GROUND: YES
CAN YOU DO YARD WORK LIKE RAKING LEAVES, WEEDING OR PUSHING A MOWER: NO

## 2025-01-24 ASSESSMENT — PAIN DESCRIPTION - DESCRIPTORS: DESCRIPTORS: STABBING

## 2025-01-24 ASSESSMENT — LIFESTYLE VARIABLES: SMOKING_STATUS: NONSMOKER

## 2025-01-24 ASSESSMENT — PAIN - FUNCTIONAL ASSESSMENT: PAIN_FUNCTIONAL_ASSESSMENT: 0-10

## 2025-01-24 ASSESSMENT — PAIN SCALES - GENERAL: PAINLEVEL_OUTOF10: 5 - MODERATE PAIN

## 2025-01-24 NOTE — PREPROCEDURE INSTRUCTIONS
Medication List            Accurate as of January 24, 2025 11:22 AM. Always use your most recent med list.                acetaminophen 650 mg ER tablet  Commonly known as: Tylenol 8 HOUR  Medication Adjustments for Surgery: Take/Use as prescribed     allopurinol 100 mg tablet  Commonly known as: Zyloprim  Medication Adjustments for Surgery: Take/Use as prescribed     amLODIPine 10 mg tablet  Commonly known as: Norvasc  TAKE 1 TABLET DAILY  Medication Adjustments for Surgery: Take/Use as prescribed     atenolol 25 mg tablet  Commonly known as: Tenormin  TAKE 1 TABLET DAILY  Medication Adjustments for Surgery: Take/Use as prescribed     atorvastatin 10 mg tablet  Commonly known as: Lipitor  TAKE 1 TABLET BY MOUTH EVERY DAY AS DIRECTED  Medication Adjustments for Surgery: Take/Use as prescribed     ergocalciferol 1.25 MG (61997 UT) capsule  Commonly known as: Vitamin D-2  Additional Medication Adjustments for Surgery: Take last dose 7 days before surgery     gabapentin 400 mg capsule  Commonly known as: Neurontin  Medication Adjustments for Surgery: Take/Use as prescribed     * levothyroxine 75 mcg tablet  Commonly known as: Synthroid, Levoxyl  Medication Adjustments for Surgery: Take/Use as prescribed     * levothyroxine 50 mcg tablet  Commonly known as: Synthroid, Levoxyl  Medication Adjustments for Surgery: Take/Use as prescribed     spironolactone 25 mg tablet  Commonly known as: Aldactone  Take 1 tablet (25 mg) by mouth once daily.  Medication Adjustments for Surgery: Take/Use as prescribed     Xarelto 20 mg tablet  Generic drug: rivaroxaban  TAKE 1 TABLET BY MOUTH DAILY TAKE WITH A FULL MEAL  Medication Adjustments for Surgery: Take last dose 2 days before surgery  Notes to patient: Last dose 01/25/25           * This list has 2 medication(s) that are the same as other medications prescribed for you. Read the directions carefully, and ask your doctor or other care provider to review them with you.                             Thank you for visiting [facility].  If you have any changes to your health condition, please call the surgeons office to alert them and give them details of your symptoms.    Nubia Martinez PA-C  P: [phone]  Department of Anesthesiology and Perioperative Medicine  --    Preoperative Fasting Guidelines    Why must I stop eating and drinking near surgery time?  With sedation, food or liquid in your stomach can enter your lungs causing serious complications  Increases nausea and vomiting    When do I need to stop eating and drinking before my surgery?  Do not eat any food after midnight the night before your surgery/procedure.  You may have up to 13.5 ounces of clear liquid until TWO hours before your instructed arrival time to the hospital.  This includes water, black tea/coffee, (no milk or cream) apple juice, and electrolyte drinks (Gatorade)  You may chew gum until TWO hours before your surgery/procedure              Preoperative Brain Exercises    What are brain exercises?  A brain exercise is any activity that engages your thinking (cognitive) skills.    What types of activities are considered brain exercises?  Jigsaw puzzles, crossword puzzles, word jumble, memory games, word search, and many more.  Many can be found free online or on your phone via a mobile elissa.    Why should I do brain exercises before my surgery?  More recent research has shown brain exercise before surgery can lower the risk of postoperative delirium (confusion) which can be especially important for older adults.  Patients who did brain exercises for 5 to 10 hours the days before surgery, cut their risk of postoperative delirium in half up to 1 week after surgery.                  [facility]    Preoperative Deep Breathing Exercises    Why it is important to do deep breathing exercises before my surgery?  Deep breathing exercises strengthen your breathing muscles.  This  helps you to recover after your surgery and decreases the chance of breathing complications.      How are the deep breathing exercises done?  Sit straight with your back supported.  Breathe in deeply and slowly through your nose. Your lower rib cage should expand and your abdomen may move forward.  Hold that breath for 3 to 5 seconds.  Breathe out through pursed lips, slowly and completely.  Rest and repeat 10 times every hour while awake.  Rest longer if you become dizzy or lightheaded.      Patient Information: Incentive Spirometer  What is an incentive spirometer?  An incentive spirometer is a device used before and after surgery to “exercise” your lungs.  It helps you to take deeper breaths to expand your lungs.  Below is an example of a basic incentive spirometer.  The device you receive may differ slightly but they all function the same.    Why do I need to use an incentive spirometer?  Using your incentive spirometer prepares your lungs for surgery and helps prevent lung problems after surgery.  How do I use my incentive spirometer?  When you're using your incentive spirometer, make sure to breathe through your mouth. If you breathe through your nose, the incentive spirometer won't work properly. You can hold your nose if you have trouble.  If you feel dizzy at any time, stop and rest. Try again at a later time.  Follow the steps below:  Set up your incentive spirometer, expand the flexible tubing and connect to the outlet.  Sit upright in a chair or bed. Hold the incentive spirometer at eye level.   Put the mouthpiece in your mouth and close your lips tightly around it. Slowly breathe out (exhale) completely.  Breathe in (inhale) slowly through your mouth as deeply as you can. As you take a breath, you will see the piston rise inside the large column. While the piston rises, the indicator should move upwards. It should stay in between the 2 arrows (see Figure).  Try to get the piston as high as you can,  while keeping the indicator between the arrows.   If the indicator doesn't stay between the arrows, you're breathing either too fast or too slow.  When you get it as high as you can, hold your breath for 10 seconds, or as long as possible. While you're holding your breath, the piston will slowly fall to the base of the spirometer.  Once the piston reaches the bottom of the spirometer, breathe out slowly through your mouth. Rest for a few seconds.  Repeat 10 times. Try to get the piston to the same level with each breath.  Repeat every hour while awake  You can carefully clean the outside of the mouthpiece with an alcohol wipe or soap and water.            Patient and Family Education             Ways You Can Help Prevent Blood Clots             This handout explains some simple things you can do to help prevent blood clots.      Blood clots are blockages that can form in the body's veins. When a blood clot forms in your deep veins, it may be called a deep vein thrombosis, or DVT for short. Blood clots can happen in any part of the body where blood flows, but they are most common in the arms and legs. If a piece of a blood clot breaks free and travels to the lungs, it is called a pulmonary embolus (PE). A PE can be a very serious problem.         Being in the hospital or having surgery can raise your chances of getting a blood clot because you may not be well enough to move around as much as you normally do.         Ways you can help prevent blood clots in the hospital         Wearing SCDs. SCDs stands for Sequential Compression Devices.   SCDs are special sleeves that wrap around your legs  They attach to a pump that fills them with air to gently squeeze your legs every few minutes.   This helps return the blood in your legs to your heart.   SCDs should only be taken off when walking or bathing.   SCDs may not be comfortable, but they can help save your life.               Wearing compression stockings - if your  doctor orders them. These special snug fitting stockings gently squeeze your legs to help blood flow.       Walking. Walking helps move the blood in your legs.   If your doctor says it is ok, try walking the halls at least   5 times a day. Ask us to help you get up, so you don't fall.      Taking any blood thinning medicines your doctor orders.             [facility]; 3/23       Ways you can help prevent blood clots at home       Wearing compression stockings - if your doctor orders them. ? Walking - to help move the blood in your legs.       Taking any blood thinning medicines your doctor orders.      Signs of a blood clot or PE      Tell your doctor or nurse know right away if you have of the problems listed below.    If you are at home, seek medical care right away. Call 911 for chest pain or problems breathing.               Signs of a blood clot (DVT) - such as pain,  swelling, redness or warmth in your arm or leg      Signs of a pulmonary embolism (PE) - such as chest     pain or feeling short of breath           The Week before Surgery        Seven days before Surgery  Check your [facility] medication instructions  Do the exercises provided to you by [facility]   Arrange for a responsible, adult licensed  to take you home after surgery and stay with you for 24 hours.  You will not be permitted to drive yourself home if you have received any anesthetic/sedation  Six days before surgery  Check your [facility] medication instructions  Do the exercises provided to you by [facility]   Start using Chlorhexidene (CHG) body wash if prescribed  Five days before surgery  Check your [facility] medication instructions  Do the exercises provided to you by [facility]   Continue to use CHG body wash if prescribed  Three days before surgery  Check your [facility] medication instructions  Do the exercises provided to you by [facility]   Continue to use CHG body wash if prescribed  Two days before surgery  Check your [facility] medication instructions  Do the exercises  provided to you by [facility]   Continue to use CHG body wash if prescribed    The Day before Surgery       Check your [facility] medication and all other [facility] instructions including when to stop eating and drinking  You will be called with your arrival time for surgery in the late afternoon.  If you do not receive a call please reach out to your surgeon's office.  Do not smoke or drink 24 hours before surgery  Prepare items to bring with you to the hospital  Shower with your chlorhexidine wash if prescribed  Brush your teeth and use your chlorhexidine dental rinse if prescribed    The Day of Surgery       Check your [facility] medication instructions  Ensure you follow the instructions for when to stop eating and drinking  Shower, if prescribed use CHG.  Do not apply any lotions, creams, moisturizers, perfume or deodorant  Brush your teeth and use your CHG dental rinse if prescribed  Wear loose comfortable clothing  Avoid make-up  Remove  jewelry and piercings, consider professional piercing removal with a plastic spacer if needed  Bring photo ID and Insurance card  Bring an accurate medication list that includes medication dose, frequency and allergies  Bring a copy of your advanced directives (will, health care power of )  Bring any devices and controllers as well as medical devices you have been provided with for surgery (CPAP, slings, braces, etc.)  Dentures, eyeglasses, and contacts will be removed before surgery, please bring cases for contacts or glasses        Patient Information: Pre-Operative Infection Prevention Measures     Why did I have my nose, under my arms, and groin swabbed?  The purpose of the swab is to identify Staphylococcus aureus inside your nose or on your skin.  The swab was sent to the laboratory for culture.  A positive swab/culture for Staphylococcus aureus is called colonization or carriage.      What is Staphylococcus aureus?  Staphylococcus aureus, also known as “staph”, is a germ found on the  skin or in the nose of healthy people.  Sometimes Staphylococcus aureus can get into the body and cause an infection.  This can be minor (such as pimples, boils, or other skin problems).  It might also be serious (such as a blood infection, pneumonia, or a surgical site infection).    What is Staphylococcus aureus colonization or carriage?  Colonization or carriage means that a person has the germ but is not sick from it.  These bacteria can be spread on the hands or when breathing or sneezing.    How is Staphylococcus aureus spread?  It is most often spread by close contact with a person or item that carries it.    What happens if my culture is positive for Staphylococcus aureus?  Your doctor/medical team will use this information to guide any antibiotic treatment which may be necessary.  Regardless of the culture results, we will clean the inside of your nose with a betadine swab just before you have your surgery.      Will I get an infection if I have Staphylococcus aureus in my nose or on my skin?  Anyone can get an infection with Staphylococcus aureus.  However, the best way to reduce your risk of infection is to follow the instructions provided to you for the use of your CHG soap and dental rinse.        Patient Information: Oral/Dental Rinse    What is oral/dental rinse?   It is a mouthwash. It is a way of cleaning the mouth with a germ-killing solution before your surgery.  The solution contains chlorhexidine, commonly known as CHG.   It is used inside the mouth to kill a bacteria known as Staphylococcus aureus.  Let your doctor know if you are allergic to Chlorhexidine.    Why do I need to use CHG oral/dental rinse?  The CHG oral/dental rinse helps to kill a bacteria in your mouth known as Staphylococcus aureus.     This reduces the risk of infection at the surgical site.      Using your CHG oral/dental rinse  STEPS:  Use your CHG oral/dental rinse after you brush your teeth the night before (at bedtime)  and the morning of your surgery.  Follow all directions on your prescription label.    Use the cap on the container to measure 15ml   Swish (gargle if you can) the mouthwash in your mouth for at least 30 seconds, (do not swallow) and spit out  After you use your CHG rinse, do not rinse your mouth with water, drink or eat.  Please refer to the prescription label for the appropriate time to resume oral intake      What side effects might I have using the CHG oral/dental rinse?  CHG rinse will stick to plaque on the teeth.  Brush and floss just before use.  Teeth brushing will help avoid staining of plaque during use.      Patient Information: Home Preoperative Antibacterial Shower      What is a home preoperative antibacterial shower?  This shower is a way of cleaning the skin with a germ-killing solution before surgery.  The solution contains chlorhexidine, commonly known as CHG.  CHG is a skin cleanser with germ-killing ability.  Let your doctor know if you are allergic to chlorhexidine.    Why do I need to take a preoperative antibacterial shower?  Skin is not sterile.  It is best to try to make your skin as free of germs as possible before surgery.  Proper cleansing with a germ-killing soap before surgery can lower the number of germs on your skin.  This helps to reduce the risk of infection at the surgical site.  Following the instructions listed below will help you prepare your skin for surgery.      How do I use the solution?  Steps:  Begin using your CHG soap 5 days before your scheduled surgery on ____01/24/2025____________________.    First, wash and rinse your hair using the CHG soap. Keep CHG soap away from ear canals and eyes.  Rinse completely, do not condition.  Hair extensions should be removed.  Wash your face with your normal soap and rinse.    Apply the CHG solution to a clean wet washcloth.  Turn the water off or move away from the water spray to avoid premature rinsing of the CHG soap as you are  applying.   Firmly lather your entire body from the neck down.  Do not use on your face.  Pay special attention to the area(s) where your incision(s) will be located unless they are on your face.  Avoid scrubbing your skin too hard.  The important point is to have the CHG soap sit on your skin for 3 minutes.    When the 3 minutes are up, turn on the water and rinse the CHG solution off your body completely.   DO NOT wash with regular soap after you have used the CHG soap solution  Pat yourself dry with a clean, freshly-laundered towel.  DO NOT apply powders, deodorants, or lotions.  Dress in clean, freshly laundered nightclothes.    Be sure to sleep with clean, freshly laundered sheets.  Be aware that CHG will cause stains on fabrics; if you wash them with bleach after use.  Rinse your washcloth and other linens that have contact with CHG completely.  Use only non-chlorine detergents to launder the items used.   The morning of surgery is the fifth day.  Repeat the above steps and dress in clean comfortable clothing     Whom should I contact if I have any questions regarding the use of CHG soap?  Call the [facility]

## 2025-01-24 NOTE — GROUP NOTE
In addition to the group class activities, Mckenna Ferguson had the following lab work completed:  No orders of the defined types were placed in this encounter.      A new History and Physical was not completed.    This class lasted approximately 2 hours and had 6 participants. The nurse instructor covered the following topics:    MyChart Enrollment  Communication with Care Team  My Chart is the best form of communication to reach all of your caregivers  You can send messages to specific care givers, or a care team  Continued Education  You will be enrolled in a Joint Replacement care plan to receive additional education before and after surgery  You can review a short recording of the class content - [email or web address]  Access to Medical Records  You can access test results, office notes, appointments, etc.  You can connect to other healthcare systems who use [facility] ([facility], [facility], [facility], etc.)  [employer]  Program Information  Consent to Enroll    Background/Understanding of Joint Replacement Surgery  Potential for same day discharge  Any questions or concerns to be directed to the surgeon's office  Not all patients are appropriate for same day discharge  All patients will be required to meet discharge criteria prior to leaving our care    How to Prepare for Surgery  Use of Recreational Products (Nicotine, Alcohol, THC, CBD, Drugs, Etc.)  The ultimate goal is to quit using thee products!  Stop several weeks before surgery  Such products slow down the healing process and increase risk of post-op infection and complications  Clearance for Surgery  Preadmission Testing - Appropriateness for anesthesia  Medical Clearance by Specialists  Dental Clearance  Cracked/Broken/Loose teeth left untreated may postpone surgery  The importance of post-op antibiotics for dental visits per surgeon protocol  Preadmission Testing  **Potential for postponed surgery if appropriate clearance is not  obtained  Medication Instruction  Follow instructions provided by the doctor who prescribes your medication (typically, but not limited to cardiologist)  Preadmission testing will provide additional instructions during your appointment on what to stop and what to take as you get closer to surgery  For clarification of these instructions, please call preadmission testing directly - [phone]  Tips for Preparing the home for discharge from the hospital  Care Partner  Requirement for surgery, the patient must have a plan to have help at home  Potential for postponed surgery if plan for home support cannot be established  Your Care Partner does not need medical training  How the care partner can help after surgery  CHG Body Wash/Mouth Wash  Follow the instructions given at preadmission testing  Body wash is to be used on the body and hair for 5 washes  Mouthwash is to be used the night before and morning of surgery  **This is a system-wide protocol developed by infectious disease professionals, we will not alter our recommendations for those with sensitive skin or those who have special hair needs.  Please follow the instructions as they are written as this will provide the best infection prevention measures for surgery.  Should you have an allergy to one of the products, please discuss with your preadmission team**    What to Expect in the Hospital/At Home  Morning of Surgery NPO Guidelines  Nothing to eat after midnight  Water can be consumed up to 2 hours prior to arrival  Surgical and Post-Surgical Care Team  Surgical Team  Anesthesia Team  Nursing  Physical Therapy  Care Coordinating  Pharmacy  Hospital Arrival Instructions  Arrive at the time provided to you  Consider traffic patterns (rush-hour) based on arrival time  Have arrangements made for a ride home  If discharging same day, care partner should remain at the hospital  Recovering after Surgery  Recovery Room - Visitors are not brought back  Transition to  hospital room - 2nd Floor, Visitors will be directed to your room  The presence of and strategies for controlling surgical pain and swelling  The importance of early mobility  Side effects after surgery  What to expect if staying overnight    Discharge Planning  The intended plan for discharge will be for patients to discharge home  All patients require a care partner (family, friend, neighbor, etc.) to stay with the patient for the first few nights after surgery  The inability to secure help at home may postpone surgery  Home Care Services set up per surgeon order  Physical Therapy  Occupational Therapy  **If desired, private duty care can be arranged by the patient ahead of time**  Outpatient Physical Therapy per surgeon order    Recovering at Home  Wound Care  Follow wound care instructions found in your discharge paperwork  Bandage is water-resistant and you may shower with the bandage  Do not scrub directly over the bandage  Do not submerge in water until cleared (bathtub, hot tub, pool, etc.)    Post-Op Risk Prevention  Infection Prevention  Promptly seek treatment for any infections post-operatively  Routine dental visits must be postponed for 3 months after surgery  Your surgeon may require antibiotics prior to future dental visits  Any concerns for infection not related directly to the knee or the hip should be managed by your primary care provider  Blood Clots  Be sure to complete the course of blood thinning medication as prescribed by your surgeon  Movement every 1-2 hours during the day is encouraged to prevent blood clots  Monitor for signs of blood clots  Wear compression stockings as prescribed by your surgeon  Constipation  Constipation is common following surgery  Drink plenty of fluids  Take stool softener/laxative as prescribed by your surgeon  Move around frequently  Eat foods high in fiber  Fall Prevention  Prepare home ahead of time to clear space to move with walker  Remove throw rugs and  electrical cords from walkways  Install railings near any stairways with more than 2 steps  Use night lights for increased visibility at night  Continue to use your assistive device until cleared by surgeon or physical therapy  Dislocation Prevention - Not all procedures will have dislocation precautions  Follow dislocation precautions provided by your surgeon  It is OK to resume sexual activity about 6 weeks following surgery  Be sure to follow any dislocation precautions assigned    Durable Medical Equipment  Cold Therapy  Breg Cold Therapy Machines  Ice/Gel Packs  Assistive Devices  Folding Walker with Wheels (in the front only)  No Rollators  Crutches if approved by Physical Therapy and Surgeon after surgery  Hip Kits  Raised Toilet Seats  Additional Compression Stockings    Joint Preservation  Healthy Activities when Cleared  Walking  Swimming  Bike Riding  Activities to Avoid  Refrain from repetitive motions which have a high impact on the joint  Gradual Progression  Progress activity slowly, listen to your body  Common Findings - NORMAL after surgery  Clicking/Grinding  Numbness near incision    Physical Therapy  Prehabilitation exercises  START TODAY ON BOTH LEGS  Surgery Specific Precautions  Follow surgery specific precautions found in your discharge paperwork    Follow-Up Visit  All patients will see their surgeon for a follow up visit after surgery  The visit may range from 2-6 weeks after surgery and is surgeon specific      Please don't hesitate to reach out if you have any additional questions or concerns.    Carolyn Dolan MBA, BSN, RN-BC, ONC  VALERIE Solorzano, RN  Sharon Stringer, HARRIS  [facility] Nav[facility]  [facility]   [phone]

## 2025-01-24 NOTE — CPM/PAT H&P
CPM/PAT Evaluation       Name: Mckenna Ferguson (Mckenna Ferguson)  /Age: 1940/84 y.o.     Visit Type:   In-Person       Chief Complaint: left hip pain    HPI: Patient is a 83 yo F scheduled for left total hip arthroplasty on 2025 with Dr. Cohen secondary to left hip osteoarthritis. Patient was referred by Dr. Cohen to CPM today for perioperative risk stratification and optimization. Patient's PMHx is notable for paroxysmal Atrial Fibrillation, HTN, HLD, Hypothyroidism      Past Medical History:   Diagnosis Date    Arrhythmia     pAF    Arthritis     Asymptomatic menopausal state 2020    Asymptomatic menopausal state    Cellulitis, unspecified 2016    Cellulitis    Cervicalgia 2021    Neck pain    Disorder of white blood cells, unspecified 2016    Neutrophilia    Dizziness and giddiness 2022    Dizziness    Dysphagia, unspecified 2022    Dysphagia    Encounter for general adult medical examination without abnormal findings 2022    Encounter for Medicare annual wellness exam    Encounter for gynecological examination (general) (routine) without abnormal findings 10/20/2022    Visit for gynecologic examination    Encounter for immunization 2019    Need for pneumococcal vaccination    Encounter for other screening for malignant neoplasm of breast 2022    Breast screening    Encounter for screening for malignant neoplasm of colon 2022    Colon cancer screening    Essential (primary) hypertension 2022    Hypertension    Fever, unspecified 2015    Fever    Foot drop, unspecified foot     Foot-drop left    Furuncle, unspecified 2020    Boil    GERD (gastroesophageal reflux disease)     Hearing aid worn     Hypothyroidism     Hypothyroidism, unspecified 2022    Hypothyroidism    Insomnia, unspecified 2019    Insomnia    Otalgia, unspecified ear 2021    Posterior auricular pain    Other abnormality of red blood  cells 06/13/2022    Elevated MCV    Other fatigue 06/13/2022    Fatigue    Pain in leg, unspecified 12/03/2019    Chronic leg pain    Pain in right hand 07/10/2017    Pain in both hands    Pain in right knee 12/23/2022    Right knee pain    Pain in right thigh 12/16/2022    Pain of right thigh    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/05/2017    Dupuytren contracture    Personal history of other diseases of the circulatory system 09/14/2015    History of hypertension    Personal history of other endocrine, nutritional and metabolic disease 09/12/2013    History of hypothyroidism    Postmenopausal atrophic vaginitis 10/20/2022    Vaginal atrophy    Presence of intraocular lens 09/28/2015    Pseudophakia of left eye    Presence of intraocular lens 09/28/2015    Pseudophakia of left eye    Presence of intraocular lens 09/28/2015    Pseudophakia of left eye    Rash and other nonspecific skin eruption 08/13/2018    Rash    Right lower quadrant pain 12/16/2015    Abdominal pain, RLQ (right lower quadrant)    Right upper quadrant pain 09/10/2018    Abdominal pain, RUQ (right upper quadrant)    Unspecified blepharitis left eye, unspecified eyelid 09/28/2015    Blepharitis of left eye    Unspecified blepharitis left eye, unspecified eyelid 09/28/2015    Blepharitis of left eye    Unspecified blepharitis left eye, unspecified eyelid 10/26/2015    Blepharitis of left eye    Unspecified fall, initial encounter 12/16/2022    Fall at home    Unspecified hearing loss, unspecified ear 09/05/2017    Change in hearing    Vitamin D deficiency, unspecified 06/13/2022    Vitamin D deficiency    Zoster without complications 06/13/2022    Shingles       Past Surgical History:   Procedure Laterality Date    BREAST BIOPSY  05/30/2013    Biopsy Breast Open    CATARACT EXTRACTION  09/28/2015    Cataract Surgery    CHOLECYSTECTOMY  05/30/2013    Cholecystectomy    HYSTERECTOMY  05/30/2013    Hysterectomy    LUMBAR LAMINECTOMY  05/30/2013     Laminectomy Lumbar    OTHER SURGICAL HISTORY  05/30/2013    Tarsorrhaphy Lateral Bilaterally    OTHER SURGICAL HISTORY  11/22/2017    Surgery Excision Lipoma       Patient  has no history on file for sexual activity.    Family History   Problem Relation Name Age of Onset    Breast cancer Mother         Allergies   Allergen Reactions    Morphine Nausea/vomiting and Nausea Only    Gluten Protein Diarrhea    Wheat Unknown    Wheat Bran Unknown    Egg GI Upset    Egg Derived Other    Gluten Diarrhea    Milk Containing Products (Dairy) GI Upset and Other    Peanut Other and GI Upset     Stomach problems       Prior to Admission medications    Medication Sig Start Date End Date Taking? Authorizing Provider   acetaminophen (Tylenol 8 HOUR) 650 mg ER tablet Take 1 tablet (650 mg) by mouth every 8 hours if needed for mild pain (1 - 3). Do not crush, chew, or split.   Yes Historical Provider, MD   allopurinol (Zyloprim) 100 mg tablet Take 1 tablet (100 mg) by mouth once daily.   Yes Historical Provider, MD   amLODIPine (Norvasc) 10 mg tablet TAKE 1 TABLET DAILY 7/15/24  Yes John Park MD   atenolol (Tenormin) 25 mg tablet TAKE 1 TABLET DAILY 10/7/24  Yes John Park MD   ergocalciferol (Vitamin D-2) 1.25 MG (05094 UT) capsule Take 1 capsule (50,000 Units) by mouth every 30 (thirty) days. 6/13/22  Yes Historical Provider, MD   levothyroxine (Synthroid, Levoxyl) 50 mcg tablet Take 1 tablet (50 mcg) by mouth once daily (M-F). 6/3/23  Yes Historical Provider, MD   levothyroxine (Synthroid, Levoxyl) 75 mcg tablet Take 1 tablet (75 mcg) by mouth. saturday and sunday 8/23/12  Yes Historical Provider, MD   spironolactone (Aldactone) 25 mg tablet Take 1 tablet (25 mg) by mouth once daily. 1/2/25 1/2/26 Yes John Park MD   Xarelto 20 mg tablet TAKE 1 TABLET BY MOUTH DAILY TAKE WITH A FULL MEAL 7/22/24  Yes John Park MD   atorvastatin (Lipitor) 10 mg tablet TAKE 1 TABLET BY MOUTH EVERY DAY AS DIRECTED 7/15/24    John Park MD   chlorhexidine (Hibiclens) 4 % external liquid Apply topically once daily for 5 days. 1/24/25 1/29/25  Nubia Martinez PA-C   chlorhexidine (Peridex) 0.12 % solution Swish and spit 15 mL night before surgery and morning of surgery 1/24/25   Nubia Martinez PA-C   gabapentin (Neurontin) 400 mg capsule Take 1 capsule (400 mg) by mouth once daily at bedtime. 8/30/23 1/2/25  Historical Provider, MD FRAZIER ROS:   Constitutional:   neg    Neuro/Psych:   neg    Eyes:   neg    Ears:   neg    Nose:   neg    Mouth:   neg    Throat:   neg    Neck:   neg    Cardio:   neg    Respiratory:   neg    Endocrine:   neg    GI:   neg    :   neg    Musculoskeletal:   neg    Hematologic:   neg    Skin:  neg        Physical Exam  Vitals and nursing note reviewed.   Constitutional:       General: She is not in acute distress.     Appearance: She is not ill-appearing or toxic-appearing.   HENT:      Head: Normocephalic and atraumatic.      Nose: Nose normal.      Mouth/Throat:      Mouth: Mucous membranes are moist.   Eyes:      Extraocular Movements: Extraocular movements intact.      Conjunctiva/sclera: Conjunctivae normal.   Neck:      Vascular: No carotid bruit.   Cardiovascular:      Rate and Rhythm: Normal rate and regular rhythm.      Pulses: Normal pulses.      Heart sounds: Normal heart sounds. No murmur heard.  Pulmonary:      Effort: Pulmonary effort is normal.      Breath sounds: Normal breath sounds.   Abdominal:      General: There is no distension.      Palpations: Abdomen is soft.      Tenderness: There is no abdominal tenderness.   Musculoskeletal:         General: Normal range of motion.      Cervical back: Normal range of motion.      Comments: Left drop foot   Skin:     General: Skin is warm and dry.      Capillary Refill: Capillary refill takes less than 2 seconds.      Comments: Right buttock w/ circular wound, not vesicular, no diffuse erythema, no warmth, no purulence   Neurological:       "General: No focal deficit present.      Mental Status: She is alert.   Psychiatric:         Mood and Affect: Mood normal.         Behavior: Behavior normal.          PAT AIRWAY:   Airway:     Mallampati::  I    TM distance::  >3 FB    Neck ROM::  Full          Visit Vitals  /71   Pulse 56   Temp 37 °C (98.6 °F) (Temporal)   Resp 16   Ht 1.676 m (5' 6\")   Wt 76.7 kg (169 lb)   SpO2 98%   BMI 27.28 kg/m²   Smoking Status Never   BSA 1.89 m²       DASI Risk Score      Flowsheet Row Pre-Admission Testing [facility]/24/2025 in [facility]   Can you take care of yourself (eat, dress, bathe, or use toilet)?  2.75 filed at 01/24/2025 1202   Can you walk indoors, such as around your house? 1.75 filed at 01/24/2025 1202   Can you walk a block or two on level ground?  2.75 filed at 01/24/2025 1202   Can you climb a flight of stairs or walk up a hill? 5.5 filed at 01/24/2025 1202   Can you run a short distance? 0 filed at 01/24/2025 1202   Can you do light work around the house like dusting or washing dishes? 2.7 filed at 01/24/2025 1202   Can you do moderate work around the house like vacuuming, sweeping floors or carrying groceries? 0 filed at 01/24/2025 1202   Can you do heavy work around the house like scrubbing floors or lifting and moving heavy furniture?  0 filed at 01/24/2025 1202   Can you do yard work like raking leaves, weeding or pushing a mower? 0 filed at 01/24/2025 1202   Can you have sexual relations? 0 filed at 01/24/2025 1202   Can you participate in moderate recreational activities like golf, bowling, dancing, doubles tennis or throwing a baseball or football? 0 filed at 01/24/2025 1202   Can you participate in strenous sports like swimming, singles tennis, football, basketball, or skiing? 0 filed at 01/24/2025 1202   DASI SCORE 15.45 filed at 01/24/2025 1202   METS Score (Will be calculated only when all the questions are answered) 4.6 filed at 01/24/2025 1202          Caprini DVT " Assessment      Flowsheet Row Pre-Admission Testing from 1/24/2025 in [facility]   DVT Score (IF A SCORE IS NOT CALCULATING, MUST SELECT A BMI TO COMPLETE) 9 filed at 01/24/2025 1201   Surgical Factors Elective major lower extremity arthroplasty filed at 01/24/2025 1201   BMI (BMI MUST BE CHOSEN) 30 or less filed at 01/24/2025 1201          Modified Frailty Index      Flowsheet Row Pre-Admission Testing from 1/24/2025 in [facility]   Non-independent functional status (problems with dressing, bathing, personal grooming, or cooking) 0 filed at 01/24/2025 1202   History of diabetes mellitus  0 filed at 01/24/2025 1202   History of COPD 0 filed at 01/24/2025 1202   History of CHF No filed at 01/24/2025 1202   History of MI 0 filed at 01/24/2025 1202   History of Percutaneous Coronary Intervention, Cardiac Surgery, or Angina No filed at 01/24/2025 1202   Hypertension requiring the use of medication  0.0909 filed at 01/24/2025 1202   Peripheral vascular disease 0 filed at 01/24/2025 1202   Impaired sensorium (cognitive impairement or loss, clouding, or delirium) 0 filed at 01/24/2025 1202   TIA or CVA withouy residual deficit 0 filed at 01/24/2025 1202   Cerebrovascular accident with deficit 0 filed at 01/24/2025 1202   Modified Frailty Index Calculator .0909 filed at 01/24/2025 1202          CHADS2 Stroke Risk  Current as of 53 minutes ago        4% 3 to 100%: High Risk   2 to < 3%: Medium Risk   0 to < 2%: Low Risk     Last Change: N/A          This score determines the patient's risk of having a stroke if the patient has atrial fibrillation.          Points Metrics   0 Has Congestive Heart Failure:  No     Patients with congestive heart failure get 1 point.    Current as of 53 minutes ago   1 Has Hypertension:  Yes     Patients with hypertension get 1 point.    Current as of 53 minutes ago   1 Age:  84     Patients who are 75 years of age or older get 1 point.    Current as of 53  minutes ago   0 Has Diabetes Excluding Gestational Diabetes:  No     Patients with diabetes get 1 point.    Current as of 53 minutes ago   0 Had Stroke:  No  Had TIA:  No  Had Thromboembolism:  No     Patients who have had a stroke, TIA, or thromboembolism get 2 points.    Current as of 53 minutes ago             Revised Cardiac Risk Index      Flowsheet Row Pre-Admission Testing from 1/24/2025 in [facility]   High-Risk Surgery (Intraperitoneal, Intrathoracic,Suprainguinal vascular) 0 filed at 01/24/2025 1152   History of ischemic heart disease (History of MI, History of positive exercuse test, Current chest paint considered due to myocardial ischemia, Use of nitrate therapy, ECG with pathological Q Waves) 0 filed at 01/24/2025 1152   History of congestive heart failure (pulmonary edemia, bilateral rales or S3 gallop, Paroxysmal nocturnal dyspnea, CXR showing pulmonary vascular redistribution) 0 filed at 01/24/2025 1152   History of cerebrovascular disease (Prior TIA or stroke) 0 filed at 01/24/2025 1152   Pre-operative insulin treatment 0 filed at 01/24/2025 1152   Pre-operative creatinine>2 mg/dl 0 filed at 01/24/2025 1152   Revised Cardiac Risk Calculator 0 filed at 01/24/2025 1152          Apfel Simplified Score      Flowsheet Row Pre-Admission Testing from 1/24/2025 in [facility]   Smoking status 1 filed at 01/24/2025 1202   History of motion sickness or PONV  0 filed at 01/24/2025 1202   Use of postoperative opioids 0 filed at 01/24/2025 1202   Gender - Female 1=Yes filed at 01/24/2025 1202   Apfel Simplified Score Calculator 2 filed at 01/24/2025 1202          Risk Analysis Index Results This Encounter    No data found in the last 10 encounters.       Stop Bang Score      Flowsheet Row Pre-Admission Testing from 1/24/2025 in [facility]   Do you snore loudly? 0 filed at 01/24/2025 1136   Do you often feel tired or fatigued after your sleep? 0 filed at  01/24/2025 1136   Has anyone ever observed you stop breathing in your sleep? 0 filed at 01/24/2025 1136   Do you have or are you being treated for high blood pressure? 1 filed at 01/24/2025 1136   Recent BMI (Calculated) 27.3 filed at 01/24/2025 1136   Is BMI greater than 35 kg/m2? 0=No filed at 01/24/2025 1136   Age older than 50 years old? 1=Yes filed at 01/24/2025 1136   Is your neck circumference greater than 17 inches (Male) or 16 inches (Female)? 0 filed at 01/24/2025 1136   Gender - Male 0=No filed at 01/24/2025 1136   STOP-BANG Total Score 2 filed at 01/24/2025 1136          Prodigy: High Risk  Total Score: 16              Prodigy Age Score           ARISCAT Score for Postoperative Pulmonary Complications      Flowsheet Row Pre-Admission Testing from 1/24/2025 in [facility]   Age Calculated Score 16 filed at 01/24/2025 1139   Preoperative SpO2 0 filed at 01/24/2025 1139   Respiratory infection in the last month Either upper or lower (i.e., URI, bronchitis, pneumonia), with fever and antibiotic treatment 0 filed at 01/24/2025 1139   Preoperative anemia (Hgb less than 10 g/dl) 0 filed at 01/24/2025 1139   Surgical incision  0 filed at 01/24/2025 1139   Duration of surgery  16 filed at 01/24/2025 1139   Emergency Procedure  0 filed at 01/24/2025 1139   ARISCAT Total Score  32 filed at 01/24/2025 1139          Mohamud Perioperative Risk for Myocardial Infarction or Cardiac Arrest (ZHANG)      Flowsheet Row Pre-Admission Testing from 1/24/2025 in [facility]   Calculated Age Score 1.7 filed at 01/24/2025 1140   Functional Status  0 filed at 01/24/2025 1140   ASA Class  -3.29 filed at 01/24/2025 1140   Creatinine 0 filed at 01/24/2025 1140   Type of Procedure  0.80 filed at 01/24/2025 1140   ZHANG Total Score  -6.04 filed at 01/24/2025 1140   ZHANG % 0.24 filed at 01/24/2025 1140            Assessment & Plan    Neuro:   No neurologic diagnoses, however, the patient is at an increased  risk for post operative delirium secondary to age >/= 65.  Preoperative brain exercise educational handout provided to patient.    The patient is at an increased risk for perioperative stroke secondary to increased age, HTN, HLD, and female sex .    HEENT/Airway:   No diagnosis or significant findings on chart review or clinical presentation and evaluation.   STOP-BANG Score- 2 points lowrisk for RUBEN    Mallampati::  I    TM distance::  >3 FB    Neck ROM::  Full  Dentures-denies  Crowns-denies  Implants-denies    Cardiovascular:    -HTN - on atenolol, spironolactone, amlodipine  -HLD - on atorvastatin  No additional preoperative testing is currently indicated.  METS: 5.6  RCRI: 0 points, 3.9%    30 day risk of MACE (risk for cardiac death, nonfatal myocardial infarction, and nonfactal cardiac arrest  ZHANG:  0.24  % risk of intraoperative or 30-day postoperative MACE  EKG performed today-sinus bradycardia Rate 52- No acute changes    Per Dr. Park's note:   83 yo WF w/ h/o parox AFIB, HTN, HLD, hypothyroid. Doing well. No further palps.   Low cardiac risk for hip surgery -> proceed as indicated (can hold Xarelto x 2-3 days preop)    Pulmonary:     No diagnosis or significant findings on chart review or clinical presentation and evaluation.   ARISCAT: 26-44 points, 13.3% risk of in-hospital postoperative pulmonary complication  PRODIGY: High risk for opioid induced respiratory depression  Smoking History-She has never smoked.  Preoperative deep breathing educational handout provided to patient.    Renal:  No diagnosis or significant findings on chart review or clinical presentation and evaluation, however, the patient is at increased risk of perioperative renal complications secondary to age>/= 56 and HTN. Preventative measures include BP monitoring, medication compliance, and hydration management.   CMP-Pending    Endocrine:    -Hypothyroidism on levothyroxine, last TSH 9/17/24 4.53, free thyroxine  1.34    Hematologic:    No diagnosis or significant findings on chart review or clinical presentation and evaluation.  The patient is not a Jehovah’s witness and will accept blood and blood products if medically indicated.   History of previous blood transfusions No  CBC-Pending    Caprini Score 9, patient at High for postoperative DVT. Pt supplied education/VTE handout  Anticoagulation use: Yes   Preoperative DVT educational handout provided to patient.    Gastrointestinal:     No diagnosis or significant findings on chart review or clinical presentation and evaluation.   Recreational drug use: Drug use No  Alcohol use none    Apfel: 2 points 39% risk for post operative N/V    Infectious disease:    No diagnosis or significant findings on chart review or clinical presentation and evaluation.   Prescription provided for CHG body wash and dental rinse. CHG use instructions reviewed and provided to patient. Patient advised to call [facility] if rx not received.   Staph screen collected-Pending    Skin:   - small rash noted on RT buttock advised to continue to monitor    Musculoskeletal:    -left hip osteoarthritis- scheduled for surgery      Anesthesia:  ASA 2 - Patient with mild systemic disease with no functional limitations    History of General anesthesia- yes  Complications- No anesthesia complications  No family history of anesthesia complications    Nickel/metal allergy-negative  Shellfish allergy-negative    Discussed with patient medication instructions, NPO guidelines, and any questions or concerns. Patient does not need further workup prior to preceding with elective surgery based on based on risk assessment.       Nubia Martinez PA-C 1/24/2025 12:03 PM      Pending labs ordered:  cbc, comp, A1c, and MSSA/MRSA culture  Follow up needed: labs

## 2025-01-24 NOTE — H&P (VIEW-ONLY)
CPM/PAT Evaluation       Name: Mckenna Ferguson (Mckenna Ferguson)  /Age: 1940/84 y.o.     Visit Type:   In-Person       Chief Complaint: left hip pain    HPI: Patient is a 85 yo F scheduled for left total hip arthroplasty on 2025 with Dr. Cohen secondary to left hip osteoarthritis. Patient was referred by Dr. Cohen to CPM today for perioperative risk stratification and optimization. Patient's PMHx is notable for paroxysmal Atrial Fibrillation, HTN, HLD, Hypothyroidism      Past Medical History:   Diagnosis Date    Arrhythmia     pAF    Arthritis     Asymptomatic menopausal state 2020    Asymptomatic menopausal state    Cellulitis, unspecified 2016    Cellulitis    Cervicalgia 2021    Neck pain    Disorder of white blood cells, unspecified 2016    Neutrophilia    Dizziness and giddiness 2022    Dizziness    Dysphagia, unspecified 2022    Dysphagia    Encounter for general adult medical examination without abnormal findings 2022    Encounter for Medicare annual wellness exam    Encounter for gynecological examination (general) (routine) without abnormal findings 10/20/2022    Visit for gynecologic examination    Encounter for immunization 2019    Need for pneumococcal vaccination    Encounter for other screening for malignant neoplasm of breast 2022    Breast screening    Encounter for screening for malignant neoplasm of colon 2022    Colon cancer screening    Essential (primary) hypertension 2022    Hypertension    Fever, unspecified 2015    Fever    Foot drop, unspecified foot     Foot-drop left    Furuncle, unspecified 2020    Boil    GERD (gastroesophageal reflux disease)     Hearing aid worn     Hypothyroidism     Hypothyroidism, unspecified 2022    Hypothyroidism    Insomnia, unspecified 2019    Insomnia    Otalgia, unspecified ear 2021    Posterior auricular pain    Other abnormality of red blood  cells 06/13/2022    Elevated MCV    Other fatigue 06/13/2022    Fatigue    Pain in leg, unspecified 12/03/2019    Chronic leg pain    Pain in right hand 07/10/2017    Pain in both hands    Pain in right knee 12/23/2022    Right knee pain    Pain in right thigh 12/16/2022    Pain of right thigh    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/05/2017    Dupuytren contracture    Personal history of other diseases of the circulatory system 09/14/2015    History of hypertension    Personal history of other endocrine, nutritional and metabolic disease 09/12/2013    History of hypothyroidism    Postmenopausal atrophic vaginitis 10/20/2022    Vaginal atrophy    Presence of intraocular lens 09/28/2015    Pseudophakia of left eye    Presence of intraocular lens 09/28/2015    Pseudophakia of left eye    Presence of intraocular lens 09/28/2015    Pseudophakia of left eye    Rash and other nonspecific skin eruption 08/13/2018    Rash    Right lower quadrant pain 12/16/2015    Abdominal pain, RLQ (right lower quadrant)    Right upper quadrant pain 09/10/2018    Abdominal pain, RUQ (right upper quadrant)    Unspecified blepharitis left eye, unspecified eyelid 09/28/2015    Blepharitis of left eye    Unspecified blepharitis left eye, unspecified eyelid 09/28/2015    Blepharitis of left eye    Unspecified blepharitis left eye, unspecified eyelid 10/26/2015    Blepharitis of left eye    Unspecified fall, initial encounter 12/16/2022    Fall at home    Unspecified hearing loss, unspecified ear 09/05/2017    Change in hearing    Vitamin D deficiency, unspecified 06/13/2022    Vitamin D deficiency    Zoster without complications 06/13/2022    Shingles       Past Surgical History:   Procedure Laterality Date    BREAST BIOPSY  05/30/2013    Biopsy Breast Open    CATARACT EXTRACTION  09/28/2015    Cataract Surgery    CHOLECYSTECTOMY  05/30/2013    Cholecystectomy    HYSTERECTOMY  05/30/2013    Hysterectomy    LUMBAR LAMINECTOMY  05/30/2013     Laminectomy Lumbar    OTHER SURGICAL HISTORY  05/30/2013    Tarsorrhaphy Lateral Bilaterally    OTHER SURGICAL HISTORY  11/22/2017    Surgery Excision Lipoma       Patient  has no history on file for sexual activity.    Family History   Problem Relation Name Age of Onset    Breast cancer Mother         Allergies   Allergen Reactions    Morphine Nausea/vomiting and Nausea Only    Gluten Protein Diarrhea    Wheat Unknown    Wheat Bran Unknown    Egg GI Upset    Egg Derived Other    Gluten Diarrhea    Milk Containing Products (Dairy) GI Upset and Other    Peanut Other and GI Upset     Stomach problems       Prior to Admission medications    Medication Sig Start Date End Date Taking? Authorizing Provider   acetaminophen (Tylenol 8 HOUR) 650 mg ER tablet Take 1 tablet (650 mg) by mouth every 8 hours if needed for mild pain (1 - 3). Do not crush, chew, or split.   Yes Historical Provider, MD   allopurinol (Zyloprim) 100 mg tablet Take 1 tablet (100 mg) by mouth once daily.   Yes Historical Provider, MD   amLODIPine (Norvasc) 10 mg tablet TAKE 1 TABLET DAILY 7/15/24  Yes John Park MD   atenolol (Tenormin) 25 mg tablet TAKE 1 TABLET DAILY 10/7/24  Yes John Park MD   ergocalciferol (Vitamin D-2) 1.25 MG (27715 UT) capsule Take 1 capsule (50,000 Units) by mouth every 30 (thirty) days. 6/13/22  Yes Historical Provider, MD   levothyroxine (Synthroid, Levoxyl) 50 mcg tablet Take 1 tablet (50 mcg) by mouth once daily (M-F). 6/3/23  Yes Historical Provider, MD   levothyroxine (Synthroid, Levoxyl) 75 mcg tablet Take 1 tablet (75 mcg) by mouth. saturday and sunday 8/23/12  Yes Historical Provider, MD   spironolactone (Aldactone) 25 mg tablet Take 1 tablet (25 mg) by mouth once daily. 1/2/25 1/2/26 Yes John Park MD   Xarelto 20 mg tablet TAKE 1 TABLET BY MOUTH DAILY TAKE WITH A FULL MEAL 7/22/24  Yes John Park MD   atorvastatin (Lipitor) 10 mg tablet TAKE 1 TABLET BY MOUTH EVERY DAY AS DIRECTED 7/15/24    John Park MD   chlorhexidine (Hibiclens) 4 % external liquid Apply topically once daily for 5 days. 1/24/25 1/29/25  Nubia Martinez PA-C   chlorhexidine (Peridex) 0.12 % solution Swish and spit 15 mL night before surgery and morning of surgery 1/24/25   Nubia Martinez PA-C   gabapentin (Neurontin) 400 mg capsule Take 1 capsule (400 mg) by mouth once daily at bedtime. 8/30/23 1/2/25  Historical Provider, MD FRAZIER ROS:   Constitutional:   neg    Neuro/Psych:   neg    Eyes:   neg    Ears:   neg    Nose:   neg    Mouth:   neg    Throat:   neg    Neck:   neg    Cardio:   neg    Respiratory:   neg    Endocrine:   neg    GI:   neg    :   neg    Musculoskeletal:   neg    Hematologic:   neg    Skin:  neg        Physical Exam  Vitals and nursing note reviewed.   Constitutional:       General: She is not in acute distress.     Appearance: She is not ill-appearing or toxic-appearing.   HENT:      Head: Normocephalic and atraumatic.      Nose: Nose normal.      Mouth/Throat:      Mouth: Mucous membranes are moist.   Eyes:      Extraocular Movements: Extraocular movements intact.      Conjunctiva/sclera: Conjunctivae normal.   Neck:      Vascular: No carotid bruit.   Cardiovascular:      Rate and Rhythm: Normal rate and regular rhythm.      Pulses: Normal pulses.      Heart sounds: Normal heart sounds. No murmur heard.  Pulmonary:      Effort: Pulmonary effort is normal.      Breath sounds: Normal breath sounds.   Abdominal:      General: There is no distension.      Palpations: Abdomen is soft.      Tenderness: There is no abdominal tenderness.   Musculoskeletal:         General: Normal range of motion.      Cervical back: Normal range of motion.      Comments: Left drop foot   Skin:     General: Skin is warm and dry.      Capillary Refill: Capillary refill takes less than 2 seconds.      Comments: Right buttock w/ circular wound, not vesicular, no diffuse erythema, no warmth, no purulence   Neurological:       "General: No focal deficit present.      Mental Status: She is alert.   Psychiatric:         Mood and Affect: Mood normal.         Behavior: Behavior normal.          PAT AIRWAY:   Airway:     Mallampati::  I    TM distance::  >3 FB    Neck ROM::  Full          Visit Vitals  /71   Pulse 56   Temp 37 °C (98.6 °F) (Temporal)   Resp 16   Ht 1.676 m (5' 6\")   Wt 76.7 kg (169 lb)   SpO2 98%   BMI 27.28 kg/m²   Smoking Status Never   BSA 1.89 m²       DASI Risk Score      Flowsheet Row Pre-Admission Testing [facility]/24/[facility]   Can you take care of yourself (eat, dress, bathe, or use toilet)?  2.75 filed at 01/24/2025 1202   Can you walk indoors, such as around your house? 1.75 filed at 01/24/2025 1202   Can you walk a block or two on level ground?  2.75 filed at 01/24/2025 1202   Can you climb a flight of stairs or walk up a hill? 5.5 filed at 01/24/2025 1202   Can you run a short distance? 0 filed at 01/24/2025 1202   Can you do light work around the house like dusting or washing dishes? 2.7 filed at 01/24/2025 1202   Can you do moderate work around the house like vacuuming, sweeping floors or carrying groceries? 0 filed at 01/24/2025 1202   Can you do heavy work around the house like scrubbing floors or lifting and moving heavy furniture?  0 filed at 01/24/2025 1202   Can you do yard work like raking leaves, weeding or pushing a mower? 0 filed at 01/24/2025 1202   Can you have sexual relations? 0 filed at 01/24/2025 1202   Can you participate in moderate recreational activities like golf, bowling, dancing, doubles tennis or throwing a baseball or football? 0 filed at 01/24/2025 1202   Can you participate in strenous sports like swimming, singles tennis, football, basketball, or skiing? 0 filed at 01/24/2025 1202   DASI SCORE 15.45 filed at 01/24/2025 1202   METS Score (Will be calculated only when all the questions are answered) 4.6 filed at 01/24/2025 1202          Caprini DVT " Assessment      Flowsheet Row Pre-Admission Testing from 1/24/2025 in [facility]   DVT Score (IF A SCORE IS NOT CALCULATING, MUST SELECT A BMI TO COMPLETE) 9 filed at 01/24/2025 1201   Surgical Factors Elective major lower extremity arthroplasty filed at 01/24/2025 1201   BMI (BMI MUST BE CHOSEN) 30 or less filed at 01/24/2025 1201          Modified Frailty Index      Flowsheet Row Pre-Admission Testing from 1/24/2025 in [facility]   Non-independent functional status (problems with dressing, bathing, personal grooming, or cooking) 0 filed at 01/24/2025 1202   History of diabetes mellitus  0 filed at 01/24/2025 1202   History of COPD 0 filed at 01/24/2025 1202   History of CHF No filed at 01/24/2025 1202   History of MI 0 filed at 01/24/2025 1202   History of Percutaneous Coronary Intervention, Cardiac Surgery, or Angina No filed at 01/24/2025 1202   Hypertension requiring the use of medication  0.0909 filed at 01/24/2025 1202   Peripheral vascular disease 0 filed at 01/24/2025 1202   Impaired sensorium (cognitive impairement or loss, clouding, or delirium) 0 filed at 01/24/2025 1202   TIA or CVA withouy residual deficit 0 filed at 01/24/2025 1202   Cerebrovascular accident with deficit 0 filed at 01/24/2025 1202   Modified Frailty Index Calculator .0909 filed at 01/24/2025 1202          CHADS2 Stroke Risk  Current as of 53 minutes ago        4% 3 to 100%: High Risk   2 to < 3%: Medium Risk   0 to < 2%: Low Risk     Last Change: N/A          This score determines the patient's risk of having a stroke if the patient has atrial fibrillation.          Points Metrics   0 Has Congestive Heart Failure:  No     Patients with congestive heart failure get 1 point.    Current as of 53 minutes ago   1 Has Hypertension:  Yes     Patients with hypertension get 1 point.    Current as of 53 minutes ago   1 Age:  84     Patients who are 75 years of age or older get 1 point.    Current as of 53  minutes ago   0 Has Diabetes Excluding Gestational Diabetes:  No     Patients with diabetes get 1 point.    Current as of 53 minutes ago   0 Had Stroke:  No  Had TIA:  No  Had Thromboembolism:  No     Patients who have had a stroke, TIA, or thromboembolism get 2 points.    Current as of 53 minutes ago             Revised Cardiac Risk Index      Flowsheet Row Pre-Admission Testing from 1/24/2025 in [facility]   High-Risk Surgery (Intraperitoneal, Intrathoracic,Suprainguinal vascular) 0 filed at 01/24/2025 1152   History of ischemic heart disease (History of MI, History of positive exercuse test, Current chest paint considered due to myocardial ischemia, Use of nitrate therapy, ECG with pathological Q Waves) 0 filed at 01/24/2025 1152   History of congestive heart failure (pulmonary edemia, bilateral rales or S3 gallop, Paroxysmal nocturnal dyspnea, CXR showing pulmonary vascular redistribution) 0 filed at 01/24/2025 1152   History of cerebrovascular disease (Prior TIA or stroke) 0 filed at 01/24/2025 1152   Pre-operative insulin treatment 0 filed at 01/24/2025 1152   Pre-operative creatinine>2 mg/dl 0 filed at 01/24/2025 1152   Revised Cardiac Risk Calculator 0 filed at 01/24/2025 1152          Apfel Simplified Score      Flowsheet Row Pre-Admission Testing from 1/24/2025 in [facility]   Smoking status 1 filed at 01/24/2025 1202   History of motion sickness or PONV  0 filed at 01/24/2025 1202   Use of postoperative opioids 0 filed at 01/24/2025 1202   Gender - Female 1=Yes filed at 01/24/2025 1202   Apfel Simplified Score Calculator 2 filed at 01/24/2025 1202          Risk Analysis Index Results This Encounter    No data found in the last 10 encounters.       Stop Bang Score      Flowsheet Row Pre-Admission Testing from 1/24/2025 in [facility]   Do you snore loudly? 0 filed at 01/24/2025 1136   Do you often feel tired or fatigued after your sleep? 0 filed at  01/24/2025 1136   Has anyone ever observed you stop breathing in your sleep? 0 filed at 01/24/2025 1136   Do you have or are you being treated for high blood pressure? 1 filed at 01/24/2025 1136   Recent BMI (Calculated) 27.3 filed at 01/24/2025 1136   Is BMI greater than 35 kg/m2? 0=No filed at 01/24/2025 1136   Age older than 50 years old? 1=Yes filed at 01/24/2025 1136   Is your neck circumference greater than 17 inches (Male) or 16 inches (Female)? 0 filed at 01/24/2025 1136   Gender - Male 0=No filed at 01/24/2025 1136   STOP-BANG Total Score 2 filed at 01/24/2025 1136          Prodigy: High Risk  Total Score: 16              Prodigy Age Score           ARISCAT Score for Postoperative Pulmonary Complications      Flowsheet Row Pre-Admission Testing from 1/24/2025 in [facility]   Age Calculated Score 16 filed at 01/24/2025 1139   Preoperative SpO2 0 filed at 01/24/2025 1139   Respiratory infection in the last month Either upper or lower (i.e., URI, bronchitis, pneumonia), with fever and antibiotic treatment 0 filed at 01/24/2025 1139   Preoperative anemia (Hgb less than 10 g/dl) 0 filed at 01/24/2025 1139   Surgical incision  0 filed at 01/24/2025 1139   Duration of surgery  16 filed at 01/24/2025 1139   Emergency Procedure  0 filed at 01/24/2025 1139   ARISCAT Total Score  32 filed at 01/24/2025 1139          Mohamud Perioperative Risk for Myocardial Infarction or Cardiac Arrest (ZHANG)      Flowsheet Row Pre-Admission Testing from 1/24/2025 in [facility]   Calculated Age Score 1.7 filed at 01/24/2025 1140   Functional Status  0 filed at 01/24/2025 1140   ASA Class  -3.29 filed at 01/24/2025 1140   Creatinine 0 filed at 01/24/2025 1140   Type of Procedure  0.80 filed at 01/24/2025 1140   ZHANG Total Score  -6.04 filed at 01/24/2025 1140   ZHANG % 0.24 filed at 01/24/2025 1140            Assessment & Plan    Neuro:   No neurologic diagnoses, however, the patient is at an increased  risk for post operative delirium secondary to age >/= 65.  Preoperative brain exercise educational handout provided to patient.    The patient is at an increased risk for perioperative stroke secondary to increased age, HTN, HLD, and female sex .    HEENT/Airway:   No diagnosis or significant findings on chart review or clinical presentation and evaluation.   STOP-BANG Score- 2 points lowrisk for RUBEN    Mallampati::  I    TM distance::  >3 FB    Neck ROM::  Full  Dentures-denies  Crowns-denies  Implants-denies    Cardiovascular:    -HTN - on atenolol, spironolactone, amlodipine  -HLD - on atorvastatin  No additional preoperative testing is currently indicated.  METS: 5.6  RCRI: 0 points, 3.9%    30 day risk of MACE (risk for cardiac death, nonfatal myocardial infarction, and nonfactal cardiac arrest  ZHANG:  0.24  % risk of intraoperative or 30-day postoperative MACE  EKG performed today-sinus bradycardia Rate 52- No acute changes    Per Dr. aPrk's note:   85 yo WF w/ h/o parox AFIB, HTN, HLD, hypothyroid. Doing well. No further palps.   Low cardiac risk for hip surgery -> proceed as indicated (can hold Xarelto x 2-3 days preop)    Pulmonary:     No diagnosis or significant findings on chart review or clinical presentation and evaluation.   ARISCAT: 26-44 points, 13.3% risk of in-hospital postoperative pulmonary complication  PRODIGY: High risk for opioid induced respiratory depression  Smoking History-She has never smoked.  Preoperative deep breathing educational handout provided to patient.    Renal:  No diagnosis or significant findings on chart review or clinical presentation and evaluation, however, the patient is at increased risk of perioperative renal complications secondary to age>/= 56 and HTN. Preventative measures include BP monitoring, medication compliance, and hydration management.   CMP-Pending    Endocrine:    -Hypothyroidism on levothyroxine, last TSH 9/17/24 4.53, free thyroxine  1.34    Hematologic:    No diagnosis or significant findings on chart review or clinical presentation and evaluation.  The patient is not a Jehovah’s witness and will accept blood and blood products if medically indicated.   History of previous blood transfusions No  CBC-Pending    Caprini Score 9, patient at High for postoperative DVT. Pt supplied education/VTE handout  Anticoagulation use: Yes   Preoperative DVT educational handout provided to patient.    Gastrointestinal:     No diagnosis or significant findings on chart review or clinical presentation and evaluation.   Recreational drug use: Drug use No  Alcohol use none    Apfel: 2 points 39% risk for post operative N/V    Infectious disease:    No diagnosis or significant findings on chart review or clinical presentation and evaluation.   Prescription provided for CHG body wash and dental rinse. CHG use instructions reviewed and provided to patient. Patient advised to call [facility] if rx not received.   Staph screen collected-Pending    Skin:   - small rash noted on RT buttock advised to continue to monitor    Musculoskeletal:    -left hip osteoarthritis- scheduled for surgery      Anesthesia:  ASA 2 - Patient with mild systemic disease with no functional limitations    History of General anesthesia- yes  Complications- No anesthesia complications  No family history of anesthesia complications    Nickel/metal allergy-negative  Shellfish allergy-negative    Discussed with patient medication instructions, NPO guidelines, and any questions or concerns. Patient does not need further workup prior to preceding with elective surgery based on based on risk assessment.       Nubia Martinez PA-C 1/24/2025 12:03 PM      Pending labs ordered:  cbc, comp, A1c, and MSSA/MRSA culture  Follow up needed: labs

## 2025-01-25 LAB
EST. AVERAGE GLUCOSE BLD GHB EST-MCNC: 108 MG/DL
HBA1C MFR BLD: 5.4 %

## 2025-01-26 PROBLEM — Z96.642 S/P TOTAL LEFT HIP ARTHROPLASTY: Status: ACTIVE | Noted: 2025-01-26

## 2025-01-26 LAB — STAPHYLOCOCCUS SPEC CULT: NORMAL

## 2025-01-26 RX ORDER — TRAMADOL HYDROCHLORIDE 50 MG/1
50 TABLET ORAL EVERY 6 HOURS PRN
Qty: 28 TABLET | Refills: 0 | Status: SHIPPED | OUTPATIENT
Start: 2025-01-26 | End: 2025-02-04

## 2025-01-26 RX ORDER — ACETAMINOPHEN 500 MG
1000 TABLET ORAL EVERY 6 HOURS PRN
Qty: 240 TABLET | Refills: 0 | Status: SHIPPED | OUTPATIENT
Start: 2025-01-26 | End: 2025-02-25

## 2025-01-26 RX ORDER — PANTOPRAZOLE SODIUM 40 MG/1
40 TABLET, DELAYED RELEASE ORAL DAILY
Qty: 30 TABLET | Refills: 0 | Status: SHIPPED | OUTPATIENT
Start: 2025-01-26 | End: 2025-02-27

## 2025-01-26 RX ORDER — DOCUSATE SODIUM 100 MG/1
100 CAPSULE, LIQUID FILLED ORAL 2 TIMES DAILY
Qty: 60 CAPSULE | Refills: 0 | Status: SHIPPED | OUTPATIENT
Start: 2025-01-26 | End: 2025-02-27

## 2025-01-26 RX ORDER — POLYETHYLENE GLYCOL 3350 17 G/17G
17 POWDER, FOR SOLUTION ORAL DAILY
Qty: 510 G | Refills: 0 | Status: SHIPPED | OUTPATIENT
Start: 2025-01-26

## 2025-01-26 RX ORDER — OXYCODONE HYDROCHLORIDE 5 MG/1
5 TABLET ORAL EVERY 6 HOURS PRN
Qty: 28 TABLET | Refills: 0 | Status: SHIPPED | OUTPATIENT
Start: 2025-01-26 | End: 2025-02-04

## 2025-01-26 NOTE — DISCHARGE INSTRUCTIONS
MD Marga Jean MPAS, BROCK, ATC  Adult Reconstruction and Joint Replacement Surgery  Phone: [phone]     Fax: [phone]        DISCHARGE INSTRUCTIONS      PLEASE READ CAREFULLY BEFORE CONTACTING YOUR PROVIDER.    WE WORK COLLABORATIVELY AS A TEAM. CALLING MULTIPLE STAFF MEMBERS REGARDING THE SAME ISSUE WILL DELAY YOUR CARE.    [employer] IS THE PREFERRED COMMUNICATION FOR ALL TEAM MEMBERS.    POSTOPERATIVE INSTRUCTIONS: TOTAL HIP & TOTAL KNEE ARTHROPLASTY    JOINT CARE TEAM  Please use the information below to contact your care team following surgery.  If you are leaving a message or using the [employer] portal, please include your full name, date of birth and date of surgery so that we can correctly identify you.  Your call will be returned within 1-2 business days, please do not leave multiple messages with other staff regarding a single issue while you are awaiting a return call.     Who to call Contact Information Matters needing handled   Marga Jaimes PA-C  Physician Assistant [employer]   Medical questions/concerns       Aleida Tellez-    [email or web address]           [phone]  opt. 2    [phone] fax  [phone]         Scheduling office Visits  Medical questions/concerns  Leave of Absence or other paperwork  Any concerns more than 6 weeks from surgery - an appointment will need to be made   Carolyn Dolan MBA, BSN, RN-BC  Ortho Nurse Navigator [place]        __________________________________    Chele Mendez RN, BSN  Ortho Coordinator [facility]-BC  Ortho Nurse Navigator [facility]       [phone] Please call staff at the institution in which you had surgery for prescription refills        Prescription Refills  Nursing, medical question related questions or concerns within 6 weeks of surgery   Orders for Outpatient Physical Therapy             MEDICATION  REFILLS - MyChart or Nurse Navigator (Above) at the institution in which you had surgery. [facility] [place] or [facility].    -You will NOT receive a call indicating that your prescription has been filled.  Please contact your pharmacy with any questions.    Medication refills will be filled Monday-Friday 7am to 1pm ONLY. Please call the nurse navigator office or send a [employer] message for a refill request.  Any requests received outside of this timeframe will be handled on the next business day.  Please do not call multiple times or call other members of the care team for medication needs, this will cause the refill to take longer.    Per State and Institutional policy, pain medications can only be refilled every 7 days for up to six weeks following surgery.    My Chart Portal: If you are using the My Chart portal and are requesting a medication refill, please list what type of surgery you had and left or right side, medication that needs refilled, and pharmacy you would like your medication sent.     WEIGHT BEARING- weight bearing as tolerated to operative extremity     ACTIVITY-As Tolerated    DRIVING & TRAVEL AFTER SURGERY   Patients should anticipate waiting at least 4-6 weeks before traveling long distances after surgery.  You will need to stop to walk around ever 1 hour during your travel to help with blood clot prevention.    Patients may not drive until cleared by the joint nurse or the office and you are off of all narcotics.    DENTAL PROCEDURES & CLEANINGS  You must wait a minimum of 3 months for elective dental appointments after a total joint replacement, including routine cleanings or dental work including bridges, crowns, extractions, etc.. Unless, it is an emergency. You will need a prophylactic antibiotic lifelong prior to any dental visit, cleaning or procedure. Your surgeon's office or your dentist may provide a prescription antibiotic. Antibiotics are a lifelong need before dental appointments.      You  do not need antibiotics for endoscopic procedures such as colonoscopy or EGD, dermatologic biopsies or eye surgeries.    WOUND CARE  If you experience continued drainage or bleeding, you may cover with abdominal/Maxi pads (purchase at local drug store).  Knee replacements should wrap with an ace wrap.  You may shower with waterproof dressing on. Your surgical bandage will be removed by your home therapist 1 week after surgery. If you have staples intact, home care will remove in 2 weeks. If you have sutures intact, you will need to return to the office in 2 weeks for suture removal. Once the dressing is removed by home care, you may continue to shower. Let soap and water wash over the wound. DO NOT SCRUB.  Steri-strips under the bandage will remain in place until they fall off on their own.  If they are loose, you may gently remove.  If they have not fallen off in two weeks, gently peel them off. Do not remove if pulling causes resistance against the incision.  You will see suture tails sticking out of the ends of the incision.  DO NOT CUT THEM.  They will fall off when the sutures dissolve.  If they are bothersome, cover with a band aid.  Do not soak in a bath tub, hot tub, pool or lake until you are 8 weeks out from surgery.  Do not apply lotions, creams or ointments until you are 6 weeks out from surgery,    PAIN, SWELLING, BRUISING & CLICKING  Pain and swelling are a natural part of your recovery which is considered normal for up to a year after surgery.  Symptoms may be treated with movement, ice, compression stockings, elevating your leg, and by following the pain medication regimen as prescribed.  Bruising is normal for several weeks after surgery. You may also have leg swelling and pain in your shin.  You may ice areas that are tender to help with discomfort.  You are required to wear the provided compression stockings, every day, for 4 weeks following surgery.  Remove the stockings at night and place them  back on in the morning.  Pain and swelling may temporarily increase with an increase in activity or exercise.  Use ice after activity.  Audible clicking with movement or exercises is considered normal following joint replacement.  If this persists at 6 months or 1 year, please notify your surgeon.  You may also feel decreased sensation or numbness near the incision site.  This is normal and sensation may or may not return.    PERSONAL HYGIENE  You may shower upon discharging from the hospital.  Soap and water is permitted to run over the surgical dressing, steri-strips and incision.  Do not scrub directly over these items.  DO NOT soak your incision in a bath, hot tub, pool or pond/lake for a minimum of 8 weeks following your surgery.  DO NOT use lotions, creams, ointments on your wound for a minimum of 6 weeks following your surgery. At that time you may use vitamin E to assist with softening of your incision.      RESTARTING HOME ROUTINE - DIET & MEDICATIONS  Post-operative constipation can result due to a combination of inactivity, anesthesia and pain medication. To help prevent this, you should increase your water and fiber intake. Physical activity such as walking will also help stimulate the bowels.   You may resume your normal diet when you discharge home.    To avoid constipation, choose foods that help promote good bowel habits, such as foods high in fiber.  You may restart your home medications the following day after your surgery UNLESS you have been given alternate instructions.  Follow the instructions given to you on your hospital discharge instructions for more information regarding your home medications.  IF YOU EXPERIENCE NAUSEA OR DIARRHEA, FOLLOW THE B.R.A.T. DIET UNTIL SYMPTOMS RESOLVE.  If you are experiencing vomiting that lasts more than 24 hours, please contact your joint nurse.      IN-HOME PHYSICAL THERAPY & OUTPATIENT PHYSICAL THERAPY  In-home physical therapy will start 1-2 days after you  get home from the hospital.    The home care agency will call within the first 24-48 hours to set up their first visit.  Please do not call your care team to inquire during this timeframe.  Continue the exercises you were given in the hospital until you have been seen by in-home therapy.  Make sure to provide a phone number with the ability for the home care staff to leave a message if you do not answer your phone.    Outpatient physical therapy following knee replacement surgery should begin 2-3 weeks after surgery.  You will be given physical therapy order prior to discharge from the hospital. You should call to schedule this appointment ASAP if not already scheduled before surgery.  Waiting until you are ready for outpatient physical therapy will cause a delay in your care.  You may choose any outpatient physical therapy location.      EMERGENCIES - WHEN TO CONTACT THE SURGEON'S OFFICE IMMEDIATELY  Fever >101 with chills that has been present for at least 48 hours.   Excessive bleeding from incision that will not slow down. A small amount of drainage is normal and expected.  Once pressure is applied and the area is covered, do not continue to check the area regularly.  This will remove pressure and bleeding will continue.  Leave in place for 4-6 hours.  Signs of infection of incision-excessive drainage that is soaking through your dressing (especially if it is pus-like), redness that is spreading out from the edges of your incision, or increased warmth around the area.  Excruciating pain for which the pain medication, taken as instructed, is not helping.  Severe calf pain.  Go directly to the emergency room or call 911, if you are experiencing chest pain or difficulty breathing.    After Hour and Weekend Emergency Answering Service [phone]    ICE & COLD THERAPY INSTRUCTIONS    To assist with pain control and post-op swelling, you should be using ice regularly throughout recovery, especially for the first 6  weeks, regardless of the cold therapy method you use.      Always make sure there is a layer of protection between the cold pad and your skin.    If you are using ICE PACKS or GEL PACKS, you will need to alternate 20 minutes on, 20 minutes off twice per hour.    If you are using an ICE MACHINE, please follow the provided ice machine instructions.  These devices differ from ice or ice packs whereas the mechanism circulates water through tubing and a pad to provide longer periods of cold therapy to the desired site.  You can use your cold devices around the clock for optimal comfort.  We recommend using cold therapy after working with therapy or completing exercises on your own.  There is no set schedule in which you must follow while using cold therapy.  Below are a few points to remember when using a cold therapy device:    You do not need to need to use the 20 on, 20 off method.  Detach the pad from the cooler and ambulate at least once every hour.  You can check your skin under the pad at this time.  You may wear the cold therapy device during periods of sleep including overnight.  If you wake up during the night, you can check the skin at this time.  You do not need to wake up specifically to perform skin checks.  Empty the cooler and pad when device is not in use.  Follow 's instructions for cleaning your cold therapy device.    DISCHARGE MEDICATIONS - Please reference the sample schedule on the reverse side for instructions on how to best schedule medications.    PAIN MEDICATION    ___X_ Tramadol / Oxycodone  Tramadol and Oxycodone have been prescribed for post-operative pain control.    These medications will only be refilled ONCE every 7 days for a period of up to 6 weeks following surgery.  After 6 weeks, you will transition to acetaminophen and over -the- counter anti-inflammatories such as Ibuprofen, Advil or Aleve in conjunction with ICE/COLD THERAPY.   Side effects may be constipation and  nausea, vomiting, sleepiness, dizziness, lightheadedness, headache, blurred vision, dry mouth sweating, itching (if you have itching, over-the -counter Benadryl can be used as needed).  You may NOT operate a motor vehicle while taking these medications or have been cleared by your care team.     ___X_ Acetaminophen (Tylenol)  Acetaminophen has been prescribed as an adjunct for pain control. Take two 500 mg tablets every 6 hours for 4 weeks. You will not receive a refill on this medication.  Do not exceed 4000mg of acetaminophen within a 24 hour period.  Side effects may include nausea, heartburn, drowsiness, and headache.    ____ Meloxicam (Mobic)-Meloxicam has been prescribed as an adjunct anti-inflammatory to assist in pain control.    Take one 15mg tablet once daily for 4 weeks.  You will not receive refills on this medication.   Side effects may include nausea.  May not be prescribed if you are on a more potent blood thinner than aspirin or have chronic kidney disease.    BLOOD THINNER    __X__ Blood Thinner   A blood thinner has been prescribed to prevent blood clots in your leg or lungs. Take as prescribed on the bottle for 4 weeks. You will not receive a refill on this medication.  Please see special instructions for how to take Xarelto     ANTI NAUSEA    ___X_ Proton Pump Inhibitor (PPI)-Stomach Acid Reduction Medication  If you are already on a PPI, you will continue your regular medication. If you are not, you will be prescribed Pantoprazole to help with nausea and protect your stomach while taking pain medication.  You will not receive a refill on this medication.    STOOL SOFTENERS    ___X_ Colace (Docusate Sodium) & Miralax (polyethylene glycol)  Take both medications to help with constipation while using the Oxycodone and Tramadol for pain control.  You will not receive a refill on this medication.    Continued Constipation  If you continue to be constipated despite daily use of Miralax and Colace, you  try an over-the-counter Dulcolax Suppository and use per instructions on the package.       SPECIAL INSTRUCTIONS  You will take 10mg Xarelto daily for the first 5 days after surgery. Then, you can resume your home dose of 20mg daily after postoperative day 5.     You will not receive refills on the following medications.   Acetaminophen (Tylenol)  Miralax  Colace  Proton Pump Inhibitor (PPI)    Pain Medication Refills - Ortho Nurse Navigator or MyChart- Monday through Friday 7am-1pm    FOLLOW-UP- You should have an appointment with either Dr. Cohen or MIO Thomas in 6 weeks.         SAMPLE              The times below are an example of how to organize medications to optimize pain control  Your actual medication schedule may vary based on your last dose taken IN THE HOSPITAL      Time 3:00 am 6:00 am 9:00 am 12:00 pm 3:00 pm 6:00 pm 9:00 pm 12:00 am   Medications Tramadol Tylenol  Oxycodone  Miralax     Colace  Pantoprazole or other PPI  Tramadol Tylenol  Oxycodone Tramadol Tylenol  Oxycodone  Miralax Blood Thinner  Colace  Tramadol   Tylenol  Oxycodone            You may begin to wean off the pain medication as your pain remains controlled with increased activity.  The schedules provided are meant to serve as an example.  You may wean off based on your pain control.  Please note that pain medications are not filled beyond 6 weeks after surgery.              The times below are an example of how to WEAN OFF medications WHILE CONTINUING TO OPTIMIZE PAIN CONTROL.  Your actual medication schedule may vary based on your last dose taken.    Time 12:00am 4:00am 8:00am 12:00pm 4:00pm 8:00pm   Med Tramadol Oxycodone   Tramadol Oxycodone Tramadol Oxycodone     Time 12:00am 6:00am 12:00pm 6:00pm   Med Tramadol Oxycodone   Tramadol Oxycodone     Time 12:00am 8:00am 4:00pm   Med Tramadol Oxycodone   Tramadol     Time 12:00am 12:00pm   Med Tramadol Tramadol         TOTAL KNEE REPLACEMENT PATIENTS SHOULD TRANSITION TO  OUTPATIENT PHYSICAL THERAPY NO MORE THAN 3 WEEKS FOLLOWING SURGERY.  PLEASE SEE THE LIST OF  FACILITIES BELOW.  CALL TO SCHEDULE YOUR FIRST APPOINTMENT BEFORE YOU HAVE YOUR SURGERY.

## 2025-01-28 ENCOUNTER — ANESTHESIA EVENT (OUTPATIENT)
Dept: OPERATING ROOM | Facility: HOSPITAL | Age: 85
End: 2025-01-28
Payer: MEDICARE

## 2025-01-28 ENCOUNTER — APPOINTMENT (OUTPATIENT)
Dept: RADIOLOGY | Facility: HOSPITAL | Age: 85
End: 2025-01-28
Payer: MEDICARE

## 2025-01-28 ENCOUNTER — PHARMACY VISIT (OUTPATIENT)
Dept: PHARMACY | Facility: CLINIC | Age: 85
End: 2025-01-28
Payer: MEDICARE

## 2025-01-28 ENCOUNTER — HOSPITAL ENCOUNTER (OUTPATIENT)
Facility: HOSPITAL | Age: 85
Discharge: NURSING, REHAB OR LONG-TERM CARE | End: 2025-01-30
Attending: ORTHOPAEDIC SURGERY | Admitting: ORTHOPAEDIC SURGERY
Payer: MEDICARE

## 2025-01-28 ENCOUNTER — ANESTHESIA (OUTPATIENT)
Dept: OPERATING ROOM | Facility: HOSPITAL | Age: 85
End: 2025-01-28
Payer: MEDICARE

## 2025-01-28 ENCOUNTER — HOME HEALTH ADMISSION (OUTPATIENT)
Dept: HOME HEALTH SERVICES | Facility: HOME HEALTH | Age: 85
End: 2025-01-28
Payer: MEDICARE

## 2025-01-28 DIAGNOSIS — M16.12 UNILATERAL PRIMARY OSTEOARTHRITIS, LEFT HIP: ICD-10-CM

## 2025-01-28 DIAGNOSIS — I48.0 PAROXYSMAL A-FIB (MULTI): ICD-10-CM

## 2025-01-28 DIAGNOSIS — Z96.642 S/P TOTAL LEFT HIP ARTHROPLASTY: Primary | ICD-10-CM

## 2025-01-28 PROCEDURE — 7100000001 HC RECOVERY ROOM TIME - INITIAL BASE CHARGE: Performed by: ORTHOPAEDIC SURGERY

## 2025-01-28 PROCEDURE — 2500000001 HC RX 250 WO HCPCS SELF ADMINISTERED DRUGS (ALT 637 FOR MEDICARE OP)

## 2025-01-28 PROCEDURE — 2500000001 HC RX 250 WO HCPCS SELF ADMINISTERED DRUGS (ALT 637 FOR MEDICARE OP): Performed by: PHYSICIAN ASSISTANT

## 2025-01-28 PROCEDURE — 27130 TOTAL HIP ARTHROPLASTY: CPT | Performed by: ORTHOPAEDIC SURGERY

## 2025-01-28 PROCEDURE — C1713 ANCHOR/SCREW BN/BN,TIS/BN: HCPCS | Performed by: ORTHOPAEDIC SURGERY

## 2025-01-28 PROCEDURE — 2500000004 HC RX 250 GENERAL PHARMACY W/ HCPCS (ALT 636 FOR OP/ED): Performed by: STUDENT IN AN ORGANIZED HEALTH CARE EDUCATION/TRAINING PROGRAM

## 2025-01-28 PROCEDURE — 3700000002 HC GENERAL ANESTHESIA TIME - EACH INCREMENTAL 1 MINUTE: Performed by: ORTHOPAEDIC SURGERY

## 2025-01-28 PROCEDURE — 2500000001 HC RX 250 WO HCPCS SELF ADMINISTERED DRUGS (ALT 637 FOR MEDICARE OP): Performed by: HOSPITALIST

## 2025-01-28 PROCEDURE — 72170 X-RAY EXAM OF PELVIS: CPT | Performed by: RADIOLOGY

## 2025-01-28 PROCEDURE — 2500000004 HC RX 250 GENERAL PHARMACY W/ HCPCS (ALT 636 FOR OP/ED): Performed by: ANESTHESIOLOGIST ASSISTANT

## 2025-01-28 PROCEDURE — 72170 X-RAY EXAM OF PELVIS: CPT

## 2025-01-28 PROCEDURE — 99221 1ST HOSP IP/OBS SF/LOW 40: CPT | Performed by: INTERNAL MEDICINE

## 2025-01-28 PROCEDURE — 3600000005 HC OR TIME - INITIAL BASE CHARGE - PROCEDURE LEVEL FIVE: Performed by: ORTHOPAEDIC SURGERY

## 2025-01-28 PROCEDURE — A4649 SURGICAL SUPPLIES: HCPCS | Performed by: ORTHOPAEDIC SURGERY

## 2025-01-28 PROCEDURE — C1776 JOINT DEVICE (IMPLANTABLE): HCPCS | Performed by: ORTHOPAEDIC SURGERY

## 2025-01-28 PROCEDURE — 2500000005 HC RX 250 GENERAL PHARMACY W/O HCPCS: Performed by: PHYSICIAN ASSISTANT

## 2025-01-28 PROCEDURE — RXMED WILLOW AMBULATORY MEDICATION CHARGE

## 2025-01-28 PROCEDURE — 7100000002 HC RECOVERY ROOM TIME - EACH INCREMENTAL 1 MINUTE: Performed by: ORTHOPAEDIC SURGERY

## 2025-01-28 PROCEDURE — 3700000001 HC GENERAL ANESTHESIA TIME - INITIAL BASE CHARGE: Performed by: ORTHOPAEDIC SURGERY

## 2025-01-28 PROCEDURE — 2720000007 HC OR 272 NO HCPCS: Performed by: ORTHOPAEDIC SURGERY

## 2025-01-28 PROCEDURE — 7100000011 HC EXTENDED STAY RECOVERY HOURLY - NURSING UNIT

## 2025-01-28 PROCEDURE — 2500000004 HC RX 250 GENERAL PHARMACY W/ HCPCS (ALT 636 FOR OP/ED)

## 2025-01-28 PROCEDURE — 94760 N-INVAS EAR/PLS OXIMETRY 1: CPT | Mod: 59

## 2025-01-28 PROCEDURE — 2500000004 HC RX 250 GENERAL PHARMACY W/ HCPCS (ALT 636 FOR OP/ED): Mod: JZ | Performed by: PHYSICIAN ASSISTANT

## 2025-01-28 PROCEDURE — 2500000004 HC RX 250 GENERAL PHARMACY W/ HCPCS (ALT 636 FOR OP/ED): Performed by: PHYSICIAN ASSISTANT

## 2025-01-28 PROCEDURE — 99100 ANES PT EXTEME AGE<1 YR&>70: CPT | Performed by: STUDENT IN AN ORGANIZED HEALTH CARE EDUCATION/TRAINING PROGRAM

## 2025-01-28 PROCEDURE — A27130 PR TOTAL HIP ARTHROPLASTY: Performed by: STUDENT IN AN ORGANIZED HEALTH CARE EDUCATION/TRAINING PROGRAM

## 2025-01-28 PROCEDURE — 2780000003 HC OR 278 NO HCPCS: Performed by: ORTHOPAEDIC SURGERY

## 2025-01-28 PROCEDURE — 2500000004 HC RX 250 GENERAL PHARMACY W/ HCPCS (ALT 636 FOR OP/ED): Performed by: ORTHOPAEDIC SURGERY

## 2025-01-28 PROCEDURE — 2500000005 HC RX 250 GENERAL PHARMACY W/O HCPCS: Performed by: ANESTHESIOLOGIST ASSISTANT

## 2025-01-28 PROCEDURE — 7100000024 HC EXTENDED STAY RECOVERY PER MINUTE- PACU: Performed by: ORTHOPAEDIC SURGERY

## 2025-01-28 PROCEDURE — 3600000010 HC OR TIME - EACH INCREMENTAL 1 MINUTE - PROCEDURE LEVEL FIVE: Performed by: ORTHOPAEDIC SURGERY

## 2025-01-28 PROCEDURE — A27130 PR TOTAL HIP ARTHROPLASTY: Performed by: ANESTHESIOLOGIST ASSISTANT

## 2025-01-28 DEVICE — PINNACLE CANCELLOUS BONE SCREW 6.5MM X 25MM
Type: IMPLANTABLE DEVICE | Site: HIP | Status: FUNCTIONAL
Brand: PINNACLE

## 2025-01-28 DEVICE — PINNACLE GRIPTION ACETABULAR SHELL SECTOR 54MM OD
Type: IMPLANTABLE DEVICE | Site: HIP | Status: FUNCTIONAL
Brand: PINNACLE GRIPTION

## 2025-01-28 DEVICE — PINNACLE CANCELLOUS BONE SCREW 6.5MM X 30MM
Type: IMPLANTABLE DEVICE | Site: HIP | Status: FUNCTIONAL
Brand: PINNACLE

## 2025-01-28 DEVICE — FULL DOSE BONE CEMENT, 10 PACK CATALOG NUMBER IS 6191-1-010
Type: IMPLANTABLE DEVICE | Site: HIP | Status: FUNCTIONAL
Brand: SIMPLEX

## 2025-01-28 DEVICE — BIOLOX DELTA CERAMIC FEMORAL HEAD 28MM DIA +1.5 12/14 TAPER
Type: IMPLANTABLE DEVICE | Site: HIP | Status: FUNCTIONAL
Brand: BIOLOX DELTA

## 2025-01-28 RX ORDER — PROCHLORPERAZINE EDISYLATE 5 MG/ML
5 INJECTION INTRAMUSCULAR; INTRAVENOUS ONCE AS NEEDED
Status: DISCONTINUED | OUTPATIENT
Start: 2025-01-28 | End: 2025-01-28 | Stop reason: HOSPADM

## 2025-01-28 RX ORDER — LABETALOL HYDROCHLORIDE 5 MG/ML
5 INJECTION, SOLUTION INTRAVENOUS ONCE AS NEEDED
Status: DISCONTINUED | OUTPATIENT
Start: 2025-01-28 | End: 2025-01-28 | Stop reason: HOSPADM

## 2025-01-28 RX ORDER — DIPHENHYDRAMINE HYDROCHLORIDE 50 MG/ML
12.5 INJECTION INTRAMUSCULAR; INTRAVENOUS ONCE AS NEEDED
Status: DISCONTINUED | OUTPATIENT
Start: 2025-01-28 | End: 2025-01-28 | Stop reason: HOSPADM

## 2025-01-28 RX ORDER — CEFAZOLIN SODIUM 2 G/100ML
2 INJECTION, SOLUTION INTRAVENOUS ONCE
Status: COMPLETED | OUTPATIENT
Start: 2025-01-28 | End: 2025-01-28

## 2025-01-28 RX ORDER — MIDAZOLAM HYDROCHLORIDE 1 MG/ML
INJECTION, SOLUTION INTRAMUSCULAR; INTRAVENOUS CONTINUOUS PRN
Status: DISCONTINUED | OUTPATIENT
Start: 2025-01-28 | End: 2025-01-28

## 2025-01-28 RX ORDER — SODIUM CHLORIDE, SODIUM LACTATE, POTASSIUM CHLORIDE, CALCIUM CHLORIDE 600; 310; 30; 20 MG/100ML; MG/100ML; MG/100ML; MG/100ML
50 INJECTION, SOLUTION INTRAVENOUS CONTINUOUS
Status: DISCONTINUED | OUTPATIENT
Start: 2025-01-28 | End: 2025-01-28

## 2025-01-28 RX ORDER — ACETAMINOPHEN 325 MG/1
975 TABLET ORAL ONCE
Status: COMPLETED | OUTPATIENT
Start: 2025-01-28 | End: 2025-01-28

## 2025-01-28 RX ORDER — OXYCODONE HYDROCHLORIDE 5 MG/1
5 TABLET ORAL EVERY 6 HOURS PRN
Status: DISCONTINUED | OUTPATIENT
Start: 2025-01-28 | End: 2025-01-28

## 2025-01-28 RX ORDER — FENTANYL CITRATE 50 UG/ML
25 INJECTION, SOLUTION INTRAMUSCULAR; INTRAVENOUS EVERY 5 MIN PRN
Status: DISCONTINUED | OUTPATIENT
Start: 2025-01-28 | End: 2025-01-28 | Stop reason: HOSPADM

## 2025-01-28 RX ORDER — SCOPOLAMINE 1 MG/3D
1 PATCH, EXTENDED RELEASE TRANSDERMAL
Status: DISCONTINUED | OUTPATIENT
Start: 2025-01-28 | End: 2025-01-28

## 2025-01-28 RX ORDER — FENTANYL CITRATE 50 UG/ML
INJECTION, SOLUTION INTRAMUSCULAR; INTRAVENOUS AS NEEDED
Status: DISCONTINUED | OUTPATIENT
Start: 2025-01-28 | End: 2025-01-28

## 2025-01-28 RX ORDER — ROCURONIUM BROMIDE 10 MG/ML
INJECTION, SOLUTION INTRAVENOUS AS NEEDED
Status: DISCONTINUED | OUTPATIENT
Start: 2025-01-28 | End: 2025-01-28

## 2025-01-28 RX ORDER — ROPIVACAINE/EPI/CLONIDINE/KET 2.46-0.005
SYRINGE (ML) INJECTION AS NEEDED
Status: DISCONTINUED | OUTPATIENT
Start: 2025-01-28 | End: 2025-01-28 | Stop reason: HOSPADM

## 2025-01-28 RX ORDER — MELOXICAM 7.5 MG/1
7.5 TABLET ORAL ONCE
Status: COMPLETED | OUTPATIENT
Start: 2025-01-28 | End: 2025-01-28

## 2025-01-28 RX ORDER — ATENOLOL 25 MG/1
25 TABLET ORAL DAILY
Status: DISCONTINUED | OUTPATIENT
Start: 2025-01-28 | End: 2025-01-30 | Stop reason: HOSPADM

## 2025-01-28 RX ORDER — ALLOPURINOL 100 MG/1
100 TABLET ORAL DAILY
Status: DISCONTINUED | OUTPATIENT
Start: 2025-01-29 | End: 2025-01-30 | Stop reason: HOSPADM

## 2025-01-28 RX ORDER — CYCLOBENZAPRINE HCL 10 MG
5 TABLET ORAL 3 TIMES DAILY PRN
Status: DISCONTINUED | OUTPATIENT
Start: 2025-01-28 | End: 2025-01-30 | Stop reason: HOSPADM

## 2025-01-28 RX ORDER — POLYETHYLENE GLYCOL 3350 17 G/17G
17 POWDER, FOR SOLUTION ORAL DAILY
Status: DISCONTINUED | OUTPATIENT
Start: 2025-01-28 | End: 2025-01-30 | Stop reason: HOSPADM

## 2025-01-28 RX ORDER — BISACODYL 5 MG
10 TABLET, DELAYED RELEASE (ENTERIC COATED) ORAL DAILY PRN
Status: DISCONTINUED | OUTPATIENT
Start: 2025-01-28 | End: 2025-01-30 | Stop reason: HOSPADM

## 2025-01-28 RX ORDER — OXYCODONE HYDROCHLORIDE 5 MG/1
10 TABLET ORAL EVERY 4 HOURS PRN
Status: DISCONTINUED | OUTPATIENT
Start: 2025-01-28 | End: 2025-01-30 | Stop reason: HOSPADM

## 2025-01-28 RX ORDER — METOCLOPRAMIDE 10 MG/1
10 TABLET ORAL EVERY 6 HOURS PRN
Status: DISCONTINUED | OUTPATIENT
Start: 2025-01-28 | End: 2025-01-30 | Stop reason: HOSPADM

## 2025-01-28 RX ORDER — LEVOTHYROXINE SODIUM 25 UG/1
50 TABLET ORAL DAILY
Status: DISCONTINUED | OUTPATIENT
Start: 2025-01-29 | End: 2025-01-30 | Stop reason: HOSPADM

## 2025-01-28 RX ORDER — MEPERIDINE HYDROCHLORIDE 25 MG/ML
12.5 INJECTION INTRAMUSCULAR; INTRAVENOUS; SUBCUTANEOUS EVERY 10 MIN PRN
Status: DISCONTINUED | OUTPATIENT
Start: 2025-01-28 | End: 2025-01-28 | Stop reason: HOSPADM

## 2025-01-28 RX ORDER — ONDANSETRON HYDROCHLORIDE 2 MG/ML
4 INJECTION, SOLUTION INTRAVENOUS ONCE AS NEEDED
Status: DISCONTINUED | OUTPATIENT
Start: 2025-01-28 | End: 2025-01-28 | Stop reason: HOSPADM

## 2025-01-28 RX ORDER — PREGABALIN 75 MG/1
75 CAPSULE ORAL ONCE
Status: COMPLETED | OUTPATIENT
Start: 2025-01-28 | End: 2025-01-28

## 2025-01-28 RX ORDER — ONDANSETRON 4 MG/1
4 TABLET, ORALLY DISINTEGRATING ORAL EVERY 8 HOURS PRN
Status: DISCONTINUED | OUTPATIENT
Start: 2025-01-28 | End: 2025-01-30 | Stop reason: HOSPADM

## 2025-01-28 RX ORDER — HYDRALAZINE HYDROCHLORIDE 20 MG/ML
5 INJECTION INTRAMUSCULAR; INTRAVENOUS EVERY 30 MIN PRN
Status: DISCONTINUED | OUTPATIENT
Start: 2025-01-28 | End: 2025-01-28 | Stop reason: HOSPADM

## 2025-01-28 RX ORDER — NALOXONE HYDROCHLORIDE 0.4 MG/ML
0.2 INJECTION, SOLUTION INTRAMUSCULAR; INTRAVENOUS; SUBCUTANEOUS EVERY 5 MIN PRN
Status: DISCONTINUED | OUTPATIENT
Start: 2025-01-28 | End: 2025-01-30 | Stop reason: HOSPADM

## 2025-01-28 RX ORDER — DOCUSATE SODIUM 100 MG/1
100 CAPSULE, LIQUID FILLED ORAL 2 TIMES DAILY
Status: DISCONTINUED | OUTPATIENT
Start: 2025-01-28 | End: 2025-01-30 | Stop reason: HOSPADM

## 2025-01-28 RX ORDER — GABAPENTIN 400 MG/1
400 CAPSULE ORAL NIGHTLY
Status: DISCONTINUED | OUTPATIENT
Start: 2025-01-28 | End: 2025-01-30 | Stop reason: HOSPADM

## 2025-01-28 RX ORDER — ACETAMINOPHEN 500 MG
10 TABLET ORAL DAILY PRN
Status: DISCONTINUED | OUTPATIENT
Start: 2025-01-28 | End: 2025-01-30 | Stop reason: HOSPADM

## 2025-01-28 RX ORDER — ATORVASTATIN CALCIUM 20 MG/1
10 TABLET, FILM COATED ORAL DAILY
Status: DISCONTINUED | OUTPATIENT
Start: 2025-01-29 | End: 2025-01-30 | Stop reason: HOSPADM

## 2025-01-28 RX ORDER — PANTOPRAZOLE SODIUM 40 MG/1
40 TABLET, DELAYED RELEASE ORAL
Status: DISCONTINUED | OUTPATIENT
Start: 2025-01-29 | End: 2025-01-30 | Stop reason: HOSPADM

## 2025-01-28 RX ORDER — IPRATROPIUM BROMIDE 0.5 MG/2.5ML
500 SOLUTION RESPIRATORY (INHALATION) AS NEEDED
Status: DISCONTINUED | OUTPATIENT
Start: 2025-01-28 | End: 2025-01-28 | Stop reason: HOSPADM

## 2025-01-28 RX ORDER — ACETAMINOPHEN 325 MG/1
650 TABLET ORAL EVERY 4 HOURS PRN
Status: DISCONTINUED | OUTPATIENT
Start: 2025-01-28 | End: 2025-01-30 | Stop reason: HOSPADM

## 2025-01-28 RX ORDER — CEFAZOLIN SODIUM 2 G/100ML
2 INJECTION, SOLUTION INTRAVENOUS EVERY 8 HOURS
Status: COMPLETED | OUTPATIENT
Start: 2025-01-28 | End: 2025-01-29

## 2025-01-28 RX ORDER — SODIUM CHLORIDE, SODIUM LACTATE, POTASSIUM CHLORIDE, CALCIUM CHLORIDE 600; 310; 30; 20 MG/100ML; MG/100ML; MG/100ML; MG/100ML
INJECTION, SOLUTION INTRAVENOUS CONTINUOUS PRN
Status: DISCONTINUED | OUTPATIENT
Start: 2025-01-28 | End: 2025-01-28

## 2025-01-28 RX ORDER — FENTANYL CITRATE 50 UG/ML
50 INJECTION, SOLUTION INTRAMUSCULAR; INTRAVENOUS EVERY 5 MIN PRN
Status: DISCONTINUED | OUTPATIENT
Start: 2025-01-28 | End: 2025-01-28 | Stop reason: HOSPADM

## 2025-01-28 RX ORDER — OXYCODONE HYDROCHLORIDE 5 MG/1
5 TABLET ORAL EVERY 4 HOURS PRN
Status: DISCONTINUED | OUTPATIENT
Start: 2025-01-28 | End: 2025-01-30 | Stop reason: HOSPADM

## 2025-01-28 RX ORDER — TRANEXAMIC ACID 100 MG/ML
INJECTION, SOLUTION INTRAVENOUS AS NEEDED
Status: DISCONTINUED | OUTPATIENT
Start: 2025-01-28 | End: 2025-01-28

## 2025-01-28 RX ORDER — SODIUM CHLORIDE, SODIUM LACTATE, POTASSIUM CHLORIDE, CALCIUM CHLORIDE 600; 310; 30; 20 MG/100ML; MG/100ML; MG/100ML; MG/100ML
100 INJECTION, SOLUTION INTRAVENOUS CONTINUOUS
Status: DISCONTINUED | OUTPATIENT
Start: 2025-01-28 | End: 2025-01-28 | Stop reason: HOSPADM

## 2025-01-28 RX ORDER — OXYCODONE HCL 10 MG/1
10 TABLET, FILM COATED, EXTENDED RELEASE ORAL ONCE
Status: COMPLETED | OUTPATIENT
Start: 2025-01-28 | End: 2025-01-28

## 2025-01-28 RX ORDER — AMLODIPINE BESYLATE 5 MG/1
10 TABLET ORAL DAILY
Status: DISCONTINUED | OUTPATIENT
Start: 2025-01-29 | End: 2025-01-30 | Stop reason: HOSPADM

## 2025-01-28 RX ORDER — PROPOFOL 10 MG/ML
INJECTION, EMULSION INTRAVENOUS AS NEEDED
Status: DISCONTINUED | OUTPATIENT
Start: 2025-01-28 | End: 2025-01-28

## 2025-01-28 RX ORDER — ALBUTEROL SULFATE 0.83 MG/ML
2.5 SOLUTION RESPIRATORY (INHALATION) ONCE AS NEEDED
Status: DISCONTINUED | OUTPATIENT
Start: 2025-01-28 | End: 2025-01-28 | Stop reason: HOSPADM

## 2025-01-28 RX ORDER — ONDANSETRON HYDROCHLORIDE 2 MG/ML
4 INJECTION, SOLUTION INTRAVENOUS EVERY 8 HOURS PRN
Status: DISCONTINUED | OUTPATIENT
Start: 2025-01-28 | End: 2025-01-30 | Stop reason: HOSPADM

## 2025-01-28 RX ORDER — BISACODYL 10 MG/1
10 SUPPOSITORY RECTAL DAILY PRN
Status: DISCONTINUED | OUTPATIENT
Start: 2025-01-28 | End: 2025-01-30 | Stop reason: HOSPADM

## 2025-01-28 RX ORDER — METOCLOPRAMIDE HYDROCHLORIDE 5 MG/ML
10 INJECTION INTRAMUSCULAR; INTRAVENOUS EVERY 6 HOURS PRN
Status: DISCONTINUED | OUTPATIENT
Start: 2025-01-28 | End: 2025-01-30 | Stop reason: HOSPADM

## 2025-01-28 RX ORDER — LIDOCAINE HYDROCHLORIDE 10 MG/ML
INJECTION, SOLUTION EPIDURAL; INFILTRATION; INTRACAUDAL; PERINEURAL AS NEEDED
Status: DISCONTINUED | OUTPATIENT
Start: 2025-01-28 | End: 2025-01-28

## 2025-01-28 RX ORDER — ONDANSETRON HYDROCHLORIDE 2 MG/ML
INJECTION, SOLUTION INTRAVENOUS AS NEEDED
Status: DISCONTINUED | OUTPATIENT
Start: 2025-01-28 | End: 2025-01-28

## 2025-01-28 RX ADMIN — POLYETHYLENE GLYCOL 3350 17 G: 17 POWDER, FOR SOLUTION ORAL at 17:57

## 2025-01-28 RX ADMIN — LIDOCAINE HYDROCHLORIDE 50 MG: 10 INJECTION, SOLUTION EPIDURAL; INFILTRATION; INTRACAUDAL; PERINEURAL at 10:41

## 2025-01-28 RX ADMIN — FENTANYL CITRATE 50 MCG: 50 INJECTION INTRAMUSCULAR; INTRAVENOUS at 10:41

## 2025-01-28 RX ADMIN — CEFAZOLIN SODIUM 2 G: 2 INJECTION, SOLUTION INTRAVENOUS at 10:45

## 2025-01-28 RX ADMIN — DOCUSATE SODIUM 100 MG: 100 CAPSULE, LIQUID FILLED ORAL at 20:32

## 2025-01-28 RX ADMIN — PROPOFOL 140 MG: 10 INJECTION, EMULSION INTRAVENOUS at 10:41

## 2025-01-28 RX ADMIN — SUGAMMADEX 200 MG: 100 INJECTION, SOLUTION INTRAVENOUS at 12:40

## 2025-01-28 RX ADMIN — PROPOFOL 360 MG: 10 INJECTION, EMULSION INTRAVENOUS at 10:50

## 2025-01-28 RX ADMIN — ROCURONIUM BROMIDE 40 MG: 10 INJECTION, SOLUTION INTRAVENOUS at 10:41

## 2025-01-28 RX ADMIN — OXYCODONE 10 MG: 5 TABLET ORAL at 20:33

## 2025-01-28 RX ADMIN — FENTANYL CITRATE 50 MCG: 50 INJECTION INTRAMUSCULAR; INTRAVENOUS at 13:04

## 2025-01-28 RX ADMIN — FENTANYL CITRATE 25 MCG: 50 INJECTION INTRAMUSCULAR; INTRAVENOUS at 11:06

## 2025-01-28 RX ADMIN — ACETAMINOPHEN 975 MG: 325 TABLET ORAL at 09:19

## 2025-01-28 RX ADMIN — GABAPENTIN 400 MG: 400 CAPSULE ORAL at 20:33

## 2025-01-28 RX ADMIN — TRANEXAMIC ACID 1000 MG: 100 INJECTION, SOLUTION INTRAVENOUS at 10:50

## 2025-01-28 RX ADMIN — TRANEXAMIC ACID 1000 MG: 100 INJECTION, SOLUTION INTRAVENOUS at 12:10

## 2025-01-28 RX ADMIN — MELOXICAM 7.5 MG: 7.5 TABLET ORAL at 09:19

## 2025-01-28 RX ADMIN — ONDANSETRON 4 MG: 2 INJECTION, SOLUTION INTRAMUSCULAR; INTRAVENOUS at 23:28

## 2025-01-28 RX ADMIN — OXYCODONE HYDROCHLORIDE 10 MG: 10 TABLET, FILM COATED, EXTENDED RELEASE ORAL at 09:19

## 2025-01-28 RX ADMIN — CEFAZOLIN SODIUM 2 G: 2 INJECTION, SOLUTION INTRAVENOUS at 17:57

## 2025-01-28 RX ADMIN — PREGABALIN 75 MG: 75 CAPSULE ORAL at 09:19

## 2025-01-28 RX ADMIN — ATENOLOL 25 MG: 25 TABLET ORAL at 20:32

## 2025-01-28 RX ADMIN — SCOPOLAMINE 1 PATCH: 1.5 PATCH, EXTENDED RELEASE TRANSDERMAL at 09:19

## 2025-01-28 RX ADMIN — OXYCODONE 10 MG: 5 TABLET ORAL at 16:42

## 2025-01-28 RX ADMIN — SODIUM CHLORIDE, POTASSIUM CHLORIDE, SODIUM LACTATE AND CALCIUM CHLORIDE: 600; 310; 30; 20 INJECTION, SOLUTION INTRAVENOUS at 10:32

## 2025-01-28 RX ADMIN — POVIDONE-IODINE 1 APPLICATION: 5 SOLUTION TOPICAL at 09:20

## 2025-01-28 RX ADMIN — ONDANSETRON 4 MG: 2 INJECTION, SOLUTION INTRAMUSCULAR; INTRAVENOUS at 12:20

## 2025-01-28 RX ADMIN — FENTANYL CITRATE 25 MCG: 50 INJECTION INTRAMUSCULAR; INTRAVENOUS at 12:21

## 2025-01-28 RX ADMIN — FENTANYL CITRATE 50 MCG: 50 INJECTION INTRAMUSCULAR; INTRAVENOUS at 13:40

## 2025-01-28 SDOH — HEALTH STABILITY: PHYSICAL HEALTH: ON AVERAGE, HOW MANY DAYS PER WEEK DO YOU ENGAGE IN MODERATE TO STRENUOUS EXERCISE (LIKE A BRISK WALK)?: 0 DAYS

## 2025-01-28 SDOH — ECONOMIC STABILITY: FOOD INSECURITY: WITHIN THE PAST 12 MONTHS, THE FOOD YOU BOUGHT JUST DIDN'T LAST AND YOU DIDN'T HAVE MONEY TO GET MORE.: NEVER TRUE

## 2025-01-28 SDOH — ECONOMIC STABILITY: FOOD INSECURITY: HOW HARD IS IT FOR YOU TO PAY FOR THE VERY BASICS LIKE FOOD, HOUSING, MEDICAL CARE, AND HEATING?: NOT HARD AT ALL

## 2025-01-28 SDOH — SOCIAL STABILITY: SOCIAL INSECURITY
WITHIN THE LAST YEAR, HAVE YOU BEEN RAPED OR FORCED TO HAVE ANY KIND OF SEXUAL ACTIVITY BY YOUR PARTNER OR EX-PARTNER?: NO

## 2025-01-28 SDOH — ECONOMIC STABILITY: FOOD INSECURITY: WITHIN THE PAST 12 MONTHS, YOU WORRIED THAT YOUR FOOD WOULD RUN OUT BEFORE YOU GOT THE MONEY TO BUY MORE.: NEVER TRUE

## 2025-01-28 SDOH — SOCIAL STABILITY: SOCIAL INSECURITY: WITHIN THE LAST YEAR, HAVE YOU BEEN AFRAID OF YOUR PARTNER OR EX-PARTNER?: NO

## 2025-01-28 SDOH — ECONOMIC STABILITY: TRANSPORTATION INSECURITY: IN THE PAST 12 MONTHS, HAS LACK OF TRANSPORTATION KEPT YOU FROM MEDICAL APPOINTMENTS OR FROM GETTING MEDICATIONS?: NO

## 2025-01-28 SDOH — SOCIAL STABILITY: SOCIAL NETWORK: HOW OFTEN DO YOU ATTEND CHURCH OR RELIGIOUS SERVICES?: MORE THAN 4 TIMES PER YEAR

## 2025-01-28 SDOH — SOCIAL STABILITY: SOCIAL INSECURITY: WITHIN THE LAST YEAR, HAVE YOU BEEN HUMILIATED OR EMOTIONALLY ABUSED IN OTHER WAYS BY YOUR PARTNER OR EX-PARTNER?: NO

## 2025-01-28 SDOH — SOCIAL STABILITY: SOCIAL INSECURITY
WITHIN THE LAST YEAR, HAVE YOU BEEN KICKED, HIT, SLAPPED, OR OTHERWISE PHYSICALLY HURT BY YOUR PARTNER OR EX-PARTNER?: NO

## 2025-01-28 SDOH — SOCIAL STABILITY: SOCIAL INSECURITY: ABUSE: ADULT

## 2025-01-28 SDOH — SOCIAL STABILITY: SOCIAL NETWORK
DO YOU BELONG TO ANY CLUBS OR ORGANIZATIONS SUCH AS CHURCH GROUPS, UNIONS, FRATERNAL OR ATHLETIC GROUPS, OR SCHOOL GROUPS?: NO

## 2025-01-28 SDOH — SOCIAL STABILITY: SOCIAL NETWORK: HOW OFTEN DO YOU ATTEND MEETINGS OF THE CLUBS OR ORGANIZATIONS YOU BELONG TO?: NEVER

## 2025-01-28 SDOH — HEALTH STABILITY: MENTAL HEALTH
DO YOU FEEL STRESS - TENSE, RESTLESS, NERVOUS, OR ANXIOUS, OR UNABLE TO SLEEP AT NIGHT BECAUSE YOUR MIND IS TROUBLED ALL THE TIME - THESE DAYS?: NOT AT ALL

## 2025-01-28 SDOH — HEALTH STABILITY: PHYSICAL HEALTH
HOW OFTEN DO YOU NEED TO HAVE SOMEONE HELP YOU WHEN YOU READ INSTRUCTIONS, PAMPHLETS, OR OTHER WRITTEN MATERIAL FROM YOUR DOCTOR OR PHARMACY?: NEVER

## 2025-01-28 SDOH — SOCIAL STABILITY: SOCIAL INSECURITY: HAVE YOU HAD THOUGHTS OF HARMING ANYONE ELSE?: NO

## 2025-01-28 SDOH — SOCIAL STABILITY: SOCIAL INSECURITY: HAS ANYONE EVER THREATENED TO HURT YOUR FAMILY OR YOUR PETS?: NO

## 2025-01-28 SDOH — SOCIAL STABILITY: SOCIAL INSECURITY
ASK PARENT OR GUARDIAN: ARE THERE TIMES WHEN YOU, YOUR CHILD(REN), OR ANY MEMBER OF YOUR HOUSEHOLD FEEL UNSAFE, HARMED, OR THREATENED AROUND PERSONS WITH WHOM YOU KNOW OR LIVE?: NO

## 2025-01-28 SDOH — SOCIAL STABILITY: SOCIAL INSECURITY: ARE YOU MARRIED, WIDOWED, DIVORCED, SEPARATED, NEVER MARRIED, OR LIVING WITH A PARTNER?: MARRIED

## 2025-01-28 SDOH — SOCIAL STABILITY: SOCIAL INSECURITY: HAVE YOU HAD ANY THOUGHTS OF HARMING ANYONE ELSE?: NO

## 2025-01-28 SDOH — SOCIAL STABILITY: SOCIAL INSECURITY: ARE THERE ANY APPARENT SIGNS OF INJURIES/BEHAVIORS THAT COULD BE RELATED TO ABUSE/NEGLECT?: NO

## 2025-01-28 SDOH — HEALTH STABILITY: PHYSICAL HEALTH: ON AVERAGE, HOW MANY MINUTES DO YOU ENGAGE IN EXERCISE AT THIS LEVEL?: 0 MIN

## 2025-01-28 SDOH — ECONOMIC STABILITY: INCOME INSECURITY: IN THE PAST 12 MONTHS HAS THE ELECTRIC, GAS, OIL, OR WATER COMPANY THREATENED TO SHUT OFF SERVICES IN YOUR HOME?: NO

## 2025-01-28 SDOH — ECONOMIC STABILITY: HOUSING INSECURITY: IN THE LAST 12 MONTHS, WAS THERE A TIME WHEN YOU WERE NOT ABLE TO PAY THE MORTGAGE OR RENT ON TIME?: NO

## 2025-01-28 SDOH — SOCIAL STABILITY: SOCIAL INSECURITY: DO YOU FEEL ANYONE HAS EXPLOITED OR TAKEN ADVANTAGE OF YOU FINANCIALLY OR OF YOUR PERSONAL PROPERTY?: NO

## 2025-01-28 SDOH — SOCIAL STABILITY: SOCIAL NETWORK
IN A TYPICAL WEEK, HOW MANY TIMES DO YOU TALK ON THE PHONE WITH FAMILY, FRIENDS, OR NEIGHBORS?: MORE THAN THREE TIMES A WEEK

## 2025-01-28 SDOH — ECONOMIC STABILITY: HOUSING INSECURITY: AT ANY TIME IN THE PAST 12 MONTHS, WERE YOU HOMELESS OR LIVING IN A SHELTER (INCLUDING NOW)?: NO

## 2025-01-28 SDOH — SOCIAL STABILITY: SOCIAL NETWORK: HOW OFTEN DO YOU GET TOGETHER WITH FRIENDS OR RELATIVES?: THREE TIMES A WEEK

## 2025-01-28 SDOH — SOCIAL STABILITY: SOCIAL INSECURITY: ARE YOU OR HAVE YOU BEEN THREATENED OR ABUSED PHYSICALLY, EMOTIONALLY, OR SEXUALLY BY ANYONE?: NO

## 2025-01-28 SDOH — SOCIAL STABILITY: SOCIAL INSECURITY: WERE YOU ABLE TO COMPLETE ALL THE BEHAVIORAL HEALTH SCREENINGS?: YES

## 2025-01-28 SDOH — ECONOMIC STABILITY: HOUSING INSECURITY: DO YOU FEEL UNSAFE GOING BACK TO THE PLACE WHERE YOU LIVE?: NO

## 2025-01-28 SDOH — ECONOMIC STABILITY: HOUSING INSECURITY: IN THE PAST 12 MONTHS, HOW MANY TIMES HAVE YOU MOVED WHERE YOU WERE LIVING?: 1

## 2025-01-28 SDOH — SOCIAL STABILITY: SOCIAL INSECURITY: DOES ANYONE TRY TO KEEP YOU FROM HAVING/CONTACTING OTHER FRIENDS OR DOING THINGS OUTSIDE YOUR HOME?: NO

## 2025-01-28 SDOH — SOCIAL STABILITY: SOCIAL INSECURITY: DO YOU FEEL UNSAFE GOING BACK TO THE PLACE WHERE YOU ARE LIVING?: NO

## 2025-01-28 ASSESSMENT — PAIN - FUNCTIONAL ASSESSMENT
PAIN_FUNCTIONAL_ASSESSMENT: 0-10
PAIN_FUNCTIONAL_ASSESSMENT: WONG-BAKER FACES

## 2025-01-28 ASSESSMENT — COGNITIVE AND FUNCTIONAL STATUS - GENERAL
HELP NEEDED FOR BATHING: A LITTLE
MOBILITY SCORE: 19
STANDING UP FROM CHAIR USING ARMS: A LITTLE
WALKING IN HOSPITAL ROOM: A LITTLE
CLIMB 3 TO 5 STEPS WITH RAILING: A LOT
TURNING FROM BACK TO SIDE WHILE IN FLAT BAD: A LITTLE
PERSONAL GROOMING: A LITTLE
TOILETING: A LITTLE
WALKING IN HOSPITAL ROOM: A LOT
PERSONAL GROOMING: A LITTLE
DAILY ACTIVITIY SCORE: 19
CLIMB 3 TO 5 STEPS WITH RAILING: A LOT
STANDING UP FROM CHAIR USING ARMS: A LITTLE
MOBILITY SCORE: 16
TOILETING: A LITTLE
DRESSING REGULAR UPPER BODY CLOTHING: A LITTLE
PATIENT BASELINE BEDBOUND: NO
MOVING FROM LYING ON BACK TO SITTING ON SIDE OF FLAT BED WITH BEDRAILS: A LITTLE
PATIENT BASELINE BEDBOUND: NO
DRESSING REGULAR LOWER BODY CLOTHING: A LITTLE
DRESSING REGULAR UPPER BODY CLOTHING: A LITTLE
MOVING TO AND FROM BED TO CHAIR: A LITTLE
DRESSING REGULAR LOWER BODY CLOTHING: A LITTLE
HELP NEEDED FOR BATHING: A LITTLE
MOVING TO AND FROM BED TO CHAIR: A LITTLE
DAILY ACTIVITIY SCORE: 19

## 2025-01-28 ASSESSMENT — ACTIVITIES OF DAILY LIVING (ADL)
PATIENT'S MEMORY ADEQUATE TO SAFELY COMPLETE DAILY ACTIVITIES?: YES
HEARING - LEFT EAR: FUNCTIONAL
HEARING - RIGHT EAR: FUNCTIONAL
WALKS IN HOME: NEEDS ASSISTANCE
JUDGMENT_ADEQUATE_SAFELY_COMPLETE_DAILY_ACTIVITIES: YES
DRESSING YOURSELF: INDEPENDENT
BATHING: INDEPENDENT
TOILETING: INDEPENDENT
LACK_OF_TRANSPORTATION: NO
ADEQUATE_TO_COMPLETE_ADL: YES
GROOMING: INDEPENDENT
FEEDING YOURSELF: INDEPENDENT
LACK_OF_TRANSPORTATION: NO
ASSISTIVE_DEVICE: WALKER

## 2025-01-28 ASSESSMENT — PAIN DESCRIPTION - ORIENTATION
ORIENTATION: LEFT
ORIENTATION: LEFT

## 2025-01-28 ASSESSMENT — ENCOUNTER SYMPTOMS
CONSTITUTIONAL NEGATIVE: 1
CARDIOVASCULAR NEGATIVE: 1
RESPIRATORY NEGATIVE: 1
GASTROINTESTINAL NEGATIVE: 1
EYES NEGATIVE: 1

## 2025-01-28 ASSESSMENT — PAIN DESCRIPTION - DESCRIPTORS
DESCRIPTORS: ACHING
DESCRIPTORS: DISCOMFORT
DESCRIPTORS: ACHING
DESCRIPTORS: SORE
DESCRIPTORS: ACHING
DESCRIPTORS: DISCOMFORT
DESCRIPTORS: SORE
DESCRIPTORS: SORE
DESCRIPTORS: ACHING
DESCRIPTORS: ACHING;BURNING;SORE

## 2025-01-28 ASSESSMENT — LIFESTYLE VARIABLES
SKIP TO QUESTIONS 9-10: 1
AUDIT-C TOTAL SCORE: 0
HOW OFTEN DO YOU HAVE 6 OR MORE DRINKS ON ONE OCCASION: NEVER
AUDIT-C TOTAL SCORE: 0
HOW OFTEN DO YOU HAVE A DRINK CONTAINING ALCOHOL: NEVER
HOW MANY STANDARD DRINKS CONTAINING ALCOHOL DO YOU HAVE ON A TYPICAL DAY: PATIENT DOES NOT DRINK

## 2025-01-28 ASSESSMENT — PAIN SCALES - GENERAL
PAINLEVEL_OUTOF10: 8
PAINLEVEL_OUTOF10: 8
PAINLEVEL_OUTOF10: 7
PAINLEVEL_OUTOF10: 8
PAINLEVEL_OUTOF10: 7
PAINLEVEL_OUTOF10: 7
PAINLEVEL_OUTOF10: 5 - MODERATE PAIN
PAINLEVEL_OUTOF10: 7
PAINLEVEL_OUTOF10: 8
PAINLEVEL_OUTOF10: 3
PAINLEVEL_OUTOF10: 8
PAINLEVEL_OUTOF10: 5 - MODERATE PAIN
PAINLEVEL_OUTOF10: 7

## 2025-01-28 ASSESSMENT — COLUMBIA-SUICIDE SEVERITY RATING SCALE - C-SSRS
2. HAVE YOU ACTUALLY HAD ANY THOUGHTS OF KILLING YOURSELF?: NO
2. HAVE YOU ACTUALLY HAD ANY THOUGHTS OF KILLING YOURSELF?: NO
1. IN THE PAST MONTH, HAVE YOU WISHED YOU WERE DEAD OR WISHED YOU COULD GO TO SLEEP AND NOT WAKE UP?: NO
1. IN THE PAST MONTH, HAVE YOU WISHED YOU WERE DEAD OR WISHED YOU COULD GO TO SLEEP AND NOT WAKE UP?: NO
6. HAVE YOU EVER DONE ANYTHING, STARTED TO DO ANYTHING, OR PREPARED TO DO ANYTHING TO END YOUR LIFE?: NO
6. HAVE YOU EVER DONE ANYTHING, STARTED TO DO ANYTHING, OR PREPARED TO DO ANYTHING TO END YOUR LIFE?: NO

## 2025-01-28 ASSESSMENT — PATIENT HEALTH QUESTIONNAIRE - PHQ9
1. LITTLE INTEREST OR PLEASURE IN DOING THINGS: NOT AT ALL
SUM OF ALL RESPONSES TO PHQ9 QUESTIONS 1 & 2: 0
2. FEELING DOWN, DEPRESSED OR HOPELESS: NOT AT ALL

## 2025-01-28 ASSESSMENT — PAIN DESCRIPTION - LOCATION
LOCATION: HIP
LOCATION: HIP

## 2025-01-28 NOTE — PERIOPERATIVE NURSING NOTE
Attending anesthesia @ bedside.  Follow order for VS on unit, per attending anesthesia.  Order released.

## 2025-01-28 NOTE — PERIOPERATIVE NURSING NOTE
Pt tx'd to RNF via cart, accompanied by PACU RN. Receiving RN and ANM at nurses station, aware of arrival to floor.    PCNA to room after call light activated. Assisted in pt tranfer to reg bed, pt tolerated well. Pt given call light, cell phone and glasses within reach.

## 2025-01-28 NOTE — PROGRESS NOTES
"84 yr old female admitted extended recovery following left hip replacement with Dr. Cohen.  Plan is home tomorrow with [facility] PT/OT. SOC pending confirmation for 1/30/25.  Post op follow up on Thursday Mar 13, 2025 11:40 AM-[facility]  Son will transport home and assist with care as needed.    01/28/25 1705   Discharge Planning   Living Arrangements Children  (\"son\")   Support Systems Family members   Assistance Needed transportation   Type of Residence Private residence   Number of Stairs to Enter Residence 3   Number of Stairs Within Residence 13  (with rail)   Do you have animals or pets at home? Yes   Type of Animals or Pets 2 cats   Who is requesting discharge planning? Provider   Home or Post Acute Services In home services   Type of Home Care Services Home PT;Home OT   Expected Discharge Disposition Home H  ( Home Care)   Does the patient need discharge transport arranged? No   Financial Resource Strain   How hard is it for you to pay for the very basics like food, housing, medical care, and heating? Not hard   Housing Stability   In the last 12 months, was there a time when you were not able to pay the mortgage or rent on time? N   In the past 12 months, how many times have you moved where you were living? 0   At any time in the past 12 months, were you homeless or living in a shelter (including now)? N   Transportation Needs   In the past 12 months, has lack of transportation kept you from medical appointments or from getting medications? no   In the past 12 months, has lack of transportation kept you from meetings, work, or from getting things needed for daily living? No   Patient Choice   Provider Choice list and CMS website (https://medicare.gov/care-compare#search) for post-acute Quality and Resource Measure Data were provided and reviewed with: Patient   Patient / Family choosing to utilize agency / facility established prior to hospitalization No   Stroke Family Assessment   Stroke Family Assessment " Needed No

## 2025-01-28 NOTE — ANESTHESIA PROCEDURE NOTES
Airway  Date/Time: 1/28/2025 10:44 AM  Urgency: elective    Difficult airway    Staffing  Performed: DEBRA and attending   Authorized by: Hal Avery MD    Performed by: DEBRA Matta  Patient location during procedure: OR    Indications and Patient Condition  Indications for airway management: anesthesia  Preoxygenated: yes  Mask difficulty assessment: 1 - vent by mask    Final Airway Details  Final airway type: endotracheal airway      Successful airway: ETT  Cuffed: yes   Successful intubation technique: direct laryngoscopy  Facilitating devices/methods: intubating stylet  Endotracheal tube insertion site: oral  Blade: Viktor  Blade size: #3  ETT size (mm): 7.0  Cormack-Lehane Classification: grade III - view of epiglottis only  Placement verified by: capnometry   Measured from: lips  ETT to lips (cm): 21  Number of attempts at approach: 2    Additional Comments  Difficulty 2/2 small mouth opening, limited neck ROM, and anterior glottis

## 2025-01-28 NOTE — ANESTHESIA PREPROCEDURE EVALUATION
Patient: Mckenna Ferguson    Procedure Information       Date/Time: 01/28/25 1035    Procedure: ARTHROPLASTY, HIP, TOTAL, WITHOUT CEMENT ( DePuy Beaumont Cup, DePuy Juab Stem ) ** Overnight** (Left: Hip) - DePuy Beaumont Cup, DePuy Juab Stem    Location: [facility] / [facility]    Surgeons: Wilfrid Cohen MD            Relevant Problems   Anesthesia (within normal limits)      Cardiac   (+) Hypertension   (+) Paroxysmal atrial fibrillation (Multi)   (+) Pure hypercholesterolemia   (-) History of coronary artery bypass graft   (-) Pacemaker      Pulmonary   (-) Asthma   (-) Chronic obstructive pulmonary disease (Multi)   (-) RUBEN (obstructive sleep apnea)      Neuro  LEFT foot drop, uses a cane and brace to ambulate   (+) Lumbar radiculopathy   (-) CVA (cerebral vascular accident) (Multi)   (-) Seizures (Multi)      GI   (+) Dysphagia      Endocrine   (+) Hypothyroidism   (+) Hypothyroidism, unspecified   (-) Diabetes mellitus, type 2 (Multi)      Hematology   (-) Coagulopathy (Multi)      Musculoskeletal   (+) Lumbar stenosis   (+) Osteoarthritis of right hip   (+) Primary osteoarthritis of left hip   (+) Rheumatoid arthritis   (+) Spinal stenosis, lumbar region with neurogenic claudication   (+) Unilateral primary osteoarthritis, left hip      HEENT   (+) Sensorineural hearing loss, asymmetrical      ID   (+) Shingles      Skin   (+) Atopic dermatitis   (+) Basal cell carcinoma of skin of left upper limb, including shoulder   (+) Basal cell carcinoma of skin of other part of trunk   (+) Squamous cell carcinoma of skin of other parts of face       Clinical information reviewed:   Tobacco  Allergies  Meds   Med Hx  Surg Hx   Fam Hx  Soc Hx        NPO Detail:  NPO/Void Status  Date of Last Liquid: 01/27/25  Time of Last Liquid: 2000  Date of Last Solid: 01/27/25  Time of Last Solid: 1800  Time of Last Void: 0859         Physical Exam    Airway  Mallampati: I  TM distance: >3 FB  Neck ROM: full      Cardiovascular    Dental    Pulmonary    Abdominal            Anesthesia Plan    History of general anesthesia?: yes  History of complications of general anesthesia?: no    ASA 2     general     intravenous induction   Postoperative administration of opioids is intended.  Anesthetic plan and risks discussed with patient.  Use of blood products discussed with patient who consented to blood products.

## 2025-01-28 NOTE — DISCHARGE SUMMARY
MD Marga Jean, KATES, PAJessicaC, ATC  Adult Reconstruction and Joint Replacement Surgery  Phone: [phone]     Fax:[phone]             Discharge Summary    Discharge Diagnosis  Left Total Hip Arthroplasty    Issues Requiring Follow-Up  Home care services to start within 48 hours. Outpatient PT to start 2 weeks  S/P total Joint for Knees only. Hips optional.    Test Results Pending At Discharge  Pending Labs       No current pending labs.          Hospital Course  Patient underwent Left Total Hip Arthroplasty on 1/28 without complications. The patient was then taken to the PACU in stable condition. Patient was then transferred to the or.  Pain was appropriately controlled. Diet was advanced as tolerated. Patient progressed adequately through their recovery during hospital stay including PT/ OT and were recommended for discharge. Patient was then discharged on  to home in stable condition. Patient had uneventful hospital course. Patient was instructed on the use of pain medications as needed for pain. The signs and symptoms of infection were discussed and the patient was given our number to call should they have any questions or concerns following discharge.    Based on my clinical judgment, the patient was provided with a 7-day prescription for opioid medication at 30 MED, indicated for treatment of acute pain in the setting of recent Total Joint Arthroplasty. OARRS report was run and has demonstrated an appropriate time course.  The patient has been provided with counseling pertaining to safe use of opioid medication.    Patient may use operative extremity WBAT with use of walker for assistance with ambulation .  Mepilex dressing to be removed POD # 7 by home care and incision left open to air  OAC for DVT prophylaxis started on POD #1 and to be taken for 30 days    Patient is to follow-up in 6 weeks at scheduled post-op visit.     Face-to Face after surgery progress  note  Pertinent Physical Exam At Time of Discharge  Review of Systems   Constitutional: Negative.  Negative for activity change, chills, fatigue and fever.   HENT: Negative.     Eyes: Negative.    Respiratory: Negative.  Negative for cough, chest tightness, shortness of breath and wheezing.    Cardiovascular: Negative.  Negative for palpitations.   Gastrointestinal:  Negative for abdominal pain, blood in stool, nausea and vomiting.   Endocrine: Negative.  Negative for cold intolerance and polyuria.   Genitourinary: Negative.  Negative for difficulty urinating, dysuria, frequency, hematuria and urgency.   Musculoskeletal:  Positive for gait problem and joint swelling. Negative for arthralgias and back pain.   Skin: Negative.  Negative for color change, pallor, rash and wound.   Allergic/Immunologic: Negative.  Negative for environmental allergies.   Neurological:  Negative for dizziness, weakness and light-headedness.   Hematological: Negative.    Psychiatric/Behavioral:  Negative for agitation, confusion and suicidal ideas. The patient is not nervous/anxious.    All other systems reviewed and are negative    Physical Exam  side: left hip  Vitals and nursing note reviewed. VSS, Afebrile  Constitutional:       Appearance: Normal appearance, awake and alert.  HENT:      Head: Normocephalic and atraumatic.       Pupils: Pupils are equal, round, and reactive to light.   Cardiovascular:      Rate and Rhythm: Normal rate and regular rhythm.   Pulmonary:      Effort: Pulmonary effort is normal.     Abdominal:         Palpations: Abdomen is soft.   Musculoskeletal:   Sensation intact bilaterally, sural/saph/sp/tibal n.  Motor intact flexion/extension/DF/PF/EHL/FHL bilaterally. Palpable symmetric DP/PT pulse bilaterally. Spinal wearing off.    Skin:      Bulky Dressing intact to the surgical extremity. No signs of gross bloody or purulent drainage.     General: Skin is warm and dry.      Capillary Refill: Capillary refill  takes less than 2 seconds.   Neurological:      General: No focal deficit present.      Mental Status: She is alert and oriented to person, place, and time. Mental status is at baseline.   Psychiatric:         Mood and Affect: Mood normal.        Home Medications  Scheduled medications    Current Facility-Administered Medications:     acetaminophen (Tylenol) tablet 650 mg, 650 mg, oral, q4h PRN, Lizeth Travis MD    bisacodyl (Dulcolax) EC tablet 10 mg, 10 mg, oral, Daily PRN, Lizeth Travis MD    bisacodyl (Dulcolax) suppository 10 mg, 10 mg, rectal, Daily PRN, Lizeth Travis MD    ceFAZolin (Ancef) 2 g in dextrose (iso)  mL, 2 g, intravenous, q8h, Lizeth Travis MD, 2 g at 01/28/25 1757    cyclobenzaprine (Flexeril) tablet 5 mg, 5 mg, oral, TID PRN, Lizeth Travis MD    docusate sodium (Colace) capsule 100 mg, 100 mg, oral, BID, Lizeth Travis MD    HYDROmorphone (Dilaudid) injection 0.5 mg, 0.5 mg, intravenous, q2h PRN, Lizeth Travis MD    lactated Ringer's infusion, 50 mL/hr, intravenous, Continuous, Lizeth Travis MD    metoclopramide (Reglan) tablet 10 mg, 10 mg, oral, q6h PRN **OR** metoclopramide (Reglan) injection 10 mg, 10 mg, intravenous, q6h PRN, Lizeth Travis MD    naloxone (Narcan) injection 0.2 mg, 0.2 mg, intravenous, q5 min PRN, Lizeth Travis MD    ondansetron ODT (Zofran-ODT) disintegrating tablet 4 mg, 4 mg, oral, q8h PRN **OR** ondansetron (Zofran) injection 4 mg, 4 mg, intravenous, q8h PRN, Lizeth Travis MD    oxyCODONE (Roxicodone) immediate release tablet 10 mg, 10 mg, oral, q4h PRN, Lizeth Travis MD, 10 mg at 01/28/25 1642    oxyCODONE (Roxicodone) immediate release tablet 5 mg, 5 mg, oral, q6h PRN, Lizeth Travis MD    oxygen (O2) therapy, 2 L/min, inhalation, Continuous, Lizeth Travis MD    [START ON 1/29/2025] pantoprazole (ProtoNix) EC tablet 40 mg, 40 mg, oral, Daily before breakfast, Lizeth Travis,  MD    polyethylene glycol (Glycolax, Miralax) packet 17 g, 17 g, oral, Daily, Lizeth Travis MD, 17 g at 01/28/25 2383    [START ON 1/29/2025] rivaroxaban (Xarelto) tablet 10 mg, 10 mg, oral, Daily, Lizeth Travis MD     PRN medications  PRN medications: acetaminophen, bisacodyl, bisacodyl, cyclobenzaprine, HYDROmorphone, metoclopramide **OR** metoclopramide, naloxone, ondansetron ODT **OR** ondansetron, oxyCODONE, oxyCODONE    Discharge medications     Your medication list        START taking these medications        Instructions Last Dose Given Next Dose Due   acetaminophen 500 mg tablet  Commonly known as: Tylenol Extra Strength  Replaces: acetaminophen 650 mg ER tablet      Take 2 tablets (1,000 mg) by mouth every 6 hours if needed for mild pain (1 - 3).       docusate sodium 100 mg capsule  Commonly known as: Colace      Take 1 capsule (100 mg) by mouth 2 times a day.       oxyCODONE 5 mg immediate release tablet  Commonly known as: Roxicodone      Take 1 tablet (5 mg) by mouth every 6 hours if needed for severe pain (7 - 10) for up to 7 days.       pantoprazole 40 mg EC tablet  Commonly known as: ProtoNix      Take 1 tablet (40 mg) by mouth once daily. Do not crush, chew, or split.       polyethylene glycol 17 gram/dose powder  Commonly known as: Glycolax, Miralax      Mix 1 cap (17g) of powder into 8 ounces of fluid and drink once daily.       traMADol 50 mg tablet  Commonly known as: Ultram      Take 1 tablet (50 mg) by mouth every 6 hours if needed for severe pain (7 - 10) for up to 7 days.              CHANGE how you take these medications        Instructions Last Dose Given Next Dose Due   Xarelto 10 mg tablet  Generic drug: rivaroxaban  What changed:   medication strength  See the new instructions.      Take 1 tablet (10 mg) by mouth once daily in the evening. Take with meals for 5 days.       Xarelto 20 mg tablet  Generic drug: rivaroxaban  What changed: when to take this      Take 1 tablet  (20 mg) by mouth once daily in the evening. Take with meals. Take with food.              CONTINUE taking these medications        Instructions Last Dose Given Next Dose Due   allopurinol 100 mg tablet  Commonly known as: Zyloprim           amLODIPine 10 mg tablet  Commonly known as: Norvasc      TAKE 1 TABLET DAILY       atenolol 25 mg tablet  Commonly known as: Tenormin      TAKE 1 TABLET DAILY       atorvastatin 10 mg tablet  Commonly known as: Lipitor      TAKE 1 TABLET BY MOUTH EVERY DAY AS DIRECTED       ergocalciferol 1.25 MG (76508 UT) capsule  Commonly known as: Vitamin D-2           gabapentin 400 mg capsule  Commonly known as: Neurontin           levothyroxine 75 mcg tablet  Commonly known as: Synthroid, Levoxyl           levothyroxine 50 mcg tablet  Commonly known as: Synthroid, Levoxyl           multivitamin tablet           spironolactone 25 mg tablet  Commonly known as: Aldactone      Take 1 tablet (25 mg) by mouth once daily.              STOP taking these medications      acetaminophen 650 mg ER tablet  Commonly known as: Tylenol 8 HOUR  Replaced by: acetaminophen 500 mg tablet        chlorhexidine 0.12 % solution  Commonly known as: Peridex        chlorhexidine 4 % external liquid  Commonly known as: Hibiclens                  Where to Get Your Medications        These medications were sent to [facility]  [address], [place] [address]      Hours: 8 AM to 6 PM Mon-Fri, 9 AM to 1 PM Saturday Phone: [phone]   acetaminophen 500 mg tablet  docusate sodium 100 mg capsule  oxyCODONE 5 mg immediate release tablet  pantoprazole 40 mg EC tablet  polyethylene glycol 17 gram/dose powder  traMADol 50 mg tablet  Xarelto 10 mg tablet  Xarelto 20 mg tablet         You have not been prescribed any medications.     Outpatient Follow-Up  Patient to follow-up with /Marga Jaimes PA-C.  Thank you for trusting us with your care. You should be scheduled for a follow-up  post-surgical visit in 6 weeks.    Special Instructions  You will start taking Xarelto 10mg daily for the first 5 days after surgery until 2/2. After 2/2, you can resume Xarelto 20mg daily.    Please read discharge instructions provided by your surgeon before calling with questions as this will delay care.    Medication refills-Oxycodone and Tramadol will be refilled every 7 days per state law. Request refills through [facility] at the institution in which you had surgery or MyChart. All medication requests may take up to 72 hours to refill and refills after Friday 1pm will be refilled on the next business day.      No future appointments.    ALONSO Garcia, PA-C ATC  Orthopedic Physician Assisant  Adult Reconstruction and Total Joint Replacement  General Orthopedics  Department of Orthopaedic Surgery  [place] [address]  NoteSickaging preferred

## 2025-01-28 NOTE — NURSING NOTE
Pt oriented to room  Pt vitals obtained   Call light within reach  Bed alarm on  Encouraged pt to order dinner

## 2025-01-28 NOTE — CONSULTS
Consults    Reason For Consult  Benign essential hypertension, mixed hyperlipidemia, hypothyroidism, gout    History Of Present Illness  Mckenna Ferguson is a 84 y.o. female presenting with status post left total hip replacement  The patient is admitted to the orthopedics floor postoperatively.  Consulted by Dr. Cohen for benign essential hypertension, mixed hyperlipidemia, hypothyroidism and gout.  The patient reports her medical problems are controlled with her present medications.  She reports her blood pressure, thyroid and cholesterol levels have been normal.  She is having no gout symptoms for over 10 years.  She denies history of heart disease or lung disease.  She is compliant with her diet and medications.  Past Medical History  She has a past medical history of Arrhythmia, Arthritis, Asymptomatic menopausal state (12/03/2020), Atrial fibrillation (Multi) (06/23/2024), Cellulitis, unspecified (11/30/2016), Cervicalgia (08/09/2021), Disorder of white blood cells, unspecified (07/25/2016), Dizziness and giddiness (03/11/2022), Dysphagia, unspecified (04/13/2022), Encounter for general adult medical examination without abnormal findings (03/11/2022), Encounter for gynecological examination (general) (routine) without abnormal findings (10/20/2022), Encounter for immunization (12/03/2019), Encounter for other screening for malignant neoplasm of breast (03/11/2022), Encounter for screening for malignant neoplasm of colon (09/27/2022), Essential (primary) hypertension (03/11/2022), Fever, unspecified (12/16/2015), Foot drop, unspecified foot, Furuncle, unspecified (01/24/2020), GERD (gastroesophageal reflux disease), Hearing aid worn, Hypothyroidism, Hypothyroidism, unspecified (06/13/2022), Insomnia, unspecified (12/03/2019), Otalgia, unspecified ear (08/09/2021), Other abnormality of red blood cells (06/13/2022), Other fatigue (06/13/2022), Pain in leg, unspecified (12/03/2019), Pain in right hand  (07/10/2017), Pain in right knee (12/23/2022), Pain in right thigh (12/16/2022), 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), Personal history of other diseases of the circulatory system (09/14/2015), Personal history of other endocrine, nutritional and metabolic disease (09/12/2013), Postmenopausal atrophic vaginitis (10/20/2022), Presence of intraocular lens (09/28/2015), Presence of intraocular lens (09/28/2015), Presence of intraocular lens (09/28/2015), Rash and other nonspecific skin eruption (08/13/2018), Right lower quadrant pain (12/16/2015), Right upper quadrant pain (09/10/2018), Unspecified blepharitis left eye, unspecified eyelid (09/28/2015), Unspecified blepharitis left eye, unspecified eyelid (09/28/2015), Unspecified blepharitis left eye, unspecified eyelid (10/26/2015), Unspecified fall, initial encounter (12/16/2022), Unspecified hearing loss, unspecified ear (09/05/2017), Vitamin D deficiency, unspecified (06/13/2022), and Zoster without complications (06/13/2022).    She has no past medical history of Anemia, Asthma, Awareness under anesthesia, Cerebral vascular accident (Multi), Chronic kidney disease, COPD (chronic obstructive pulmonary disease) (Multi), Delayed emergence from general anesthesia, History of blood transfusion, Malignant hyperthermia, PONV (postoperative nausea and vomiting), Refusal of blood product, Seizure disorder (Multi), Sleep apnea, TIA (transient ischemic attack), Type 2 diabetes mellitus, or Vision loss.    Surgical History  She has a past surgical history that includes Lumbar laminectomy (05/30/2013); Hysterectomy (05/30/2013); Other surgical history (05/30/2013); Cholecystectomy (05/30/2013); Breast biopsy (05/30/2013); Other surgical history (11/22/2017); and Cataract extraction (09/28/2015).     Social History  She reports that she has never smoked. She has never used smokeless tobacco. She reports that she does not currently use alcohol. She reports that  she does not currently use drugs.    Family History  Family History   Problem Relation Name Age of Onset    Breast cancer Mother          Allergies  Morphine, Gluten protein, Wheat, Wheat bran, Egg, Egg derived, Gluten, Milk containing products (dairy), and Peanut    Review of Systems   Constitutional: Negative.    HENT: Negative.     Eyes: Negative.    Respiratory: Negative.     Cardiovascular: Negative.    Gastrointestinal: Negative.    All other systems reviewed and are negative.       Physical Exam  Constitutional:       Appearance: Normal appearance.   HENT:      Head: Normocephalic and atraumatic.      Nose: Nose normal.      Mouth/Throat:      Mouth: Mucous membranes are moist.      Pharynx: Oropharynx is clear.   Eyes:      Extraocular Movements: Extraocular movements intact.      Conjunctiva/sclera: Conjunctivae normal.      Pupils: Pupils are equal, round, and reactive to light.   Cardiovascular:      Rate and Rhythm: Normal rate and regular rhythm.      Pulses: Normal pulses.      Heart sounds: Normal heart sounds.   Pulmonary:      Effort: Pulmonary effort is normal.      Breath sounds: Normal breath sounds.   Abdominal:      General: Abdomen is flat. Bowel sounds are normal.      Palpations: Abdomen is soft.      Tenderness: There is no abdominal tenderness.   Musculoskeletal:         General: Normal range of motion.      Cervical back: Normal range of motion and neck supple.   Skin:     General: Skin is warm and dry.   Neurological:      General: No focal deficit present.      Mental Status: She is alert and oriented to person, place, and time.   Psychiatric:         Mood and Affect: Mood normal.         Behavior: Behavior normal.         Thought Content: Thought content normal.          Last Recorded Vitals  /61 (BP Location: Left arm, Patient Position: Lying)   Pulse 56   Temp 36 °C (96.8 °F) (Temporal)   Resp 12   Wt 76.7 kg (169 lb)   SpO2 97%     Relevant Results  No results found for  this or any previous visit (from the past 96 hours).      Assessment/Plan     Unilateral primary osteoarthritis, left hip  The patient is doing well postoperatively.  Postoperative plan including DVT prophylaxis, pain medications, physical therapy and Occupational Therapy orders per orthopedic surgery.  Hypertension  Blood pressure adequate control postoperatively.  The present medications are continued.  Vital signs will be monitored and medications adjusted as indicated.  Hypothyroidism  This is controlled by history.  The present medications are continued.  Pure hypercholesterolemia  This is controlled by history.  The present medications are continued.  Gout, unspecified  She is asymptomatic.  The present medications are continued  S/P total left hip arthroplasty  Plan as above  Ranjeet Schaeffer MD

## 2025-01-28 NOTE — CARE PLAN
The patient's goals for the shift include safety     The clinical goals for the shift include pain control/safe ambulation

## 2025-01-28 NOTE — PERIOPERATIVE NURSING NOTE
Pt VSS, observed resting quietly, no facial grimacing or other s/s of discomfort or distress.  Pt removed from monitors.

## 2025-01-28 NOTE — ANESTHESIA POSTPROCEDURE EVALUATION
Patient: Mckenna Ferguson    Procedure Summary       Date: 01/28/25 Room / Location: [facility] / [facility]    Anesthesia Start: 1032 Anesthesia Stop: 1253    Procedure: ARTHROPLASTY, HIP, TOTAL, WITHOUT CEMENT ( DePuy Ethel Cup, DePuy Bergen Stem ) ** Overnight** (Left: Hip) Diagnosis:       Unilateral primary osteoarthritis, left hip      (Unilateral primary osteoarthritis, left hip [M16.12])    Surgeons: Wilfrid Cohen MD Responsible Provider: Hal Avery MD    Anesthesia Type: general ASA Status: 2            Anesthesia Type: general    Vitals Value Taken Time   /90 01/28/25 1355   Temp 36 °C (96.8 °F) 01/28/25 1248   Pulse 54 01/28/25 1340   Resp 12 01/28/25 1355   SpO2 96 % 01/28/25 1355       Anesthesia Post Evaluation    Patient location during evaluation: PACU  Patient participation: complete - patient participated  Level of consciousness: awake  Pain management: adequate  Multimodal analgesia pain management approach  Airway patency: patent  Cardiovascular status: acceptable and hemodynamically stable  Respiratory status: acceptable and spontaneous ventilation  Hydration status: euvolemic  Postoperative Nausea and Vomiting: none  Comments: No nausea or vomiting        There were no known notable events for this encounter.

## 2025-01-28 NOTE — PERIOPERATIVE NURSING NOTE
"Post op pain control and expectations reviewed with pt. Pt stated \"I don't remember the pain like this before\" Pt noted to be anxious. Breathing techniques taught and encouraged.  Pt medicated per order for pain, stated feeling slightly queasy. VS stayed stable.    Pt encouraged to have small sips of apple juice which she tolerated.    POC and safety discussed with pt. Pt stated understanding.  Pt occasionally observed resting quietly.  "

## 2025-01-28 NOTE — PERIOPERATIVE NURSING NOTE
Attempted report to [facility]. Unable to take @ this time r/t census/no rooms.  Pt placed in PACU hold/Extended Recovery.   Attending anesthesia updated on status.

## 2025-01-29 LAB
ANION GAP SERPL CALC-SCNC: 15 MMOL/L (ref 10–20)
BUN SERPL-MCNC: 26 MG/DL (ref 6–23)
CALCIUM SERPL-MCNC: 8.9 MG/DL (ref 8.6–10.3)
CHLORIDE SERPL-SCNC: 103 MMOL/L (ref 98–107)
CO2 SERPL-SCNC: 23 MMOL/L (ref 21–32)
CREAT SERPL-MCNC: 1.21 MG/DL (ref 0.5–1.05)
EGFRCR SERPLBLD CKD-EPI 2021: 44 ML/MIN/1.73M*2
ERYTHROCYTE [DISTWIDTH] IN BLOOD BY AUTOMATED COUNT: 13 % (ref 11.5–14.5)
GLUCOSE SERPL-MCNC: 106 MG/DL (ref 74–99)
HCT VFR BLD AUTO: 40.4 % (ref 36–46)
HGB BLD-MCNC: 12.7 G/DL (ref 12–16)
MCH RBC QN AUTO: 31.7 PG (ref 26–34)
MCHC RBC AUTO-ENTMCNC: 31.4 G/DL (ref 32–36)
MCV RBC AUTO: 101 FL (ref 80–100)
NRBC BLD-RTO: ABNORMAL /100{WBCS}
PLATELET # BLD AUTO: 214 X10*3/UL (ref 150–450)
POTASSIUM SERPL-SCNC: 4.4 MMOL/L (ref 3.5–5.3)
RBC # BLD AUTO: 4.01 X10*6/UL (ref 4–5.2)
SODIUM SERPL-SCNC: 137 MMOL/L (ref 136–145)
WBC # BLD AUTO: 10.2 X10*3/UL (ref 4.4–11.3)

## 2025-01-29 PROCEDURE — 2500000001 HC RX 250 WO HCPCS SELF ADMINISTERED DRUGS (ALT 637 FOR MEDICARE OP): Performed by: HOSPITALIST

## 2025-01-29 PROCEDURE — 97110 THERAPEUTIC EXERCISES: CPT | Mod: GP

## 2025-01-29 PROCEDURE — 2500000001 HC RX 250 WO HCPCS SELF ADMINISTERED DRUGS (ALT 637 FOR MEDICARE OP)

## 2025-01-29 PROCEDURE — 99232 SBSQ HOSP IP/OBS MODERATE 35: CPT | Performed by: INTERNAL MEDICINE

## 2025-01-29 PROCEDURE — 36415 COLL VENOUS BLD VENIPUNCTURE: CPT

## 2025-01-29 PROCEDURE — 82374 ASSAY BLOOD CARBON DIOXIDE: CPT

## 2025-01-29 PROCEDURE — 2500000002 HC RX 250 W HCPCS SELF ADMINISTERED DRUGS (ALT 637 FOR MEDICARE OP, ALT 636 FOR OP/ED): Mod: MUE | Performed by: HOSPITALIST

## 2025-01-29 PROCEDURE — 97116 GAIT TRAINING THERAPY: CPT | Mod: GP

## 2025-01-29 PROCEDURE — 97162 PT EVAL MOD COMPLEX 30 MIN: CPT | Mod: GP

## 2025-01-29 PROCEDURE — 2500000004 HC RX 250 GENERAL PHARMACY W/ HCPCS (ALT 636 FOR OP/ED)

## 2025-01-29 PROCEDURE — 7100000011 HC EXTENDED STAY RECOVERY HOURLY - NURSING UNIT

## 2025-01-29 PROCEDURE — 85027 COMPLETE CBC AUTOMATED: CPT

## 2025-01-29 RX ADMIN — ALLOPURINOL 100 MG: 100 TABLET ORAL at 08:40

## 2025-01-29 RX ADMIN — RIVAROXABAN 20 MG: 20 TABLET, FILM COATED ORAL at 17:22

## 2025-01-29 RX ADMIN — OXYCODONE 5 MG: 5 TABLET ORAL at 06:07

## 2025-01-29 RX ADMIN — ATORVASTATIN CALCIUM 10 MG: 20 TABLET, FILM COATED ORAL at 08:40

## 2025-01-29 RX ADMIN — LEVOTHYROXINE SODIUM 50 MCG: 0.03 TABLET ORAL at 06:07

## 2025-01-29 RX ADMIN — HYDROMORPHONE HYDROCHLORIDE 0.5 MG: 1 INJECTION, SOLUTION INTRAMUSCULAR; INTRAVENOUS; SUBCUTANEOUS at 08:41

## 2025-01-29 RX ADMIN — POLYETHYLENE GLYCOL 3350 17 G: 17 POWDER, FOR SOLUTION ORAL at 08:41

## 2025-01-29 RX ADMIN — OXYCODONE 5 MG: 5 TABLET ORAL at 20:30

## 2025-01-29 RX ADMIN — CEFAZOLIN SODIUM 2 G: 2 INJECTION, SOLUTION INTRAVENOUS at 02:16

## 2025-01-29 RX ADMIN — HYDROMORPHONE HYDROCHLORIDE 0.5 MG: 1 INJECTION, SOLUTION INTRAMUSCULAR; INTRAVENOUS; SUBCUTANEOUS at 14:30

## 2025-01-29 RX ADMIN — OXYCODONE 5 MG: 5 TABLET ORAL at 00:26

## 2025-01-29 RX ADMIN — PANTOPRAZOLE SODIUM 40 MG: 40 TABLET, DELAYED RELEASE ORAL at 06:07

## 2025-01-29 RX ADMIN — GABAPENTIN 400 MG: 400 CAPSULE ORAL at 20:30

## 2025-01-29 RX ADMIN — AMLODIPINE BESYLATE 10 MG: 5 TABLET ORAL at 08:39

## 2025-01-29 RX ADMIN — DOCUSATE SODIUM 100 MG: 100 CAPSULE, LIQUID FILLED ORAL at 20:30

## 2025-01-29 RX ADMIN — OXYCODONE 10 MG: 5 TABLET ORAL at 11:51

## 2025-01-29 ASSESSMENT — COGNITIVE AND FUNCTIONAL STATUS - GENERAL
DRESSING REGULAR LOWER BODY CLOTHING: A LITTLE
CLIMB 3 TO 5 STEPS WITH RAILING: A LOT
MOBILITY SCORE: 13
CLIMB 3 TO 5 STEPS WITH RAILING: A LOT
WALKING IN HOSPITAL ROOM: A LITTLE
TURNING FROM BACK TO SIDE WHILE IN FLAT BAD: A LITTLE
MOBILITY SCORE: 14
MOVING TO AND FROM BED TO CHAIR: A LOT
DRESSING REGULAR UPPER BODY CLOTHING: A LITTLE
DAILY ACTIVITIY SCORE: 19
STANDING UP FROM CHAIR USING ARMS: A LITTLE
TURNING FROM BACK TO SIDE WHILE IN FLAT BAD: A LOT
MOVING TO AND FROM BED TO CHAIR: A LITTLE
WALKING IN HOSPITAL ROOM: A LOT
MOVING TO AND FROM BED TO CHAIR: A LOT
PERSONAL GROOMING: A LITTLE
MOBILITY SCORE: 19
DRESSING REGULAR LOWER BODY CLOTHING: A LITTLE
PERSONAL GROOMING: A LITTLE
STANDING UP FROM CHAIR USING ARMS: A LOT
HELP NEEDED FOR BATHING: A LITTLE
CLIMB 3 TO 5 STEPS WITH RAILING: A LOT
MOVING FROM LYING ON BACK TO SITTING ON SIDE OF FLAT BED WITH BEDRAILS: A LITTLE
MOVING FROM LYING ON BACK TO SITTING ON SIDE OF FLAT BED WITH BEDRAILS: A LITTLE
WALKING IN HOSPITAL ROOM: A LOT
TOILETING: A LITTLE
HELP NEEDED FOR BATHING: A LITTLE
TOILETING: A LOT
DRESSING REGULAR UPPER BODY CLOTHING: A LITTLE
DAILY ACTIVITIY SCORE: 18
STANDING UP FROM CHAIR USING ARMS: A LOT

## 2025-01-29 ASSESSMENT — PAIN - FUNCTIONAL ASSESSMENT
PAIN_FUNCTIONAL_ASSESSMENT: UNABLE TO SELF-REPORT
PAIN_FUNCTIONAL_ASSESSMENT: 0-10

## 2025-01-29 ASSESSMENT — PAIN SCALES - GENERAL
PAINLEVEL_OUTOF10: 7
PAINLEVEL_OUTOF10: 1
PAINLEVEL_OUTOF10: 7
PAINLEVEL_OUTOF10: 4
PAINLEVEL_OUTOF10: 4
PAINLEVEL_OUTOF10: 7
PAINLEVEL_OUTOF10: 4
PAINLEVEL_OUTOF10: 6
PAINLEVEL_OUTOF10: 7
PAINLEVEL_OUTOF10: 2
PAINLEVEL_OUTOF10: 5 - MODERATE PAIN
PAINLEVEL_OUTOF10: 6
PAINLEVEL_OUTOF10: 4
PAINLEVEL_OUTOF10: 4
PAINLEVEL_OUTOF10: 7
PAINLEVEL_OUTOF10: 6
PAINLEVEL_OUTOF10: 7

## 2025-01-29 ASSESSMENT — PAIN DESCRIPTION - DESCRIPTORS
DESCRIPTORS: OTHER (COMMENT)
DESCRIPTORS: SHARP
DESCRIPTORS: ACHING
DESCRIPTORS: ACHING
DESCRIPTORS: SHARP;TENDER;ACHING
DESCRIPTORS: SHARP
DESCRIPTORS: ACHING
DESCRIPTORS: SHARP
DESCRIPTORS: SHARP
DESCRIPTORS: DISCOMFORT
DESCRIPTORS: SHARP;SHOOTING
DESCRIPTORS: DISCOMFORT
DESCRIPTORS: DISCOMFORT
DESCRIPTORS: SHARP
DESCRIPTORS: ACHING

## 2025-01-29 ASSESSMENT — PAIN DESCRIPTION - ORIENTATION
ORIENTATION: LEFT
ORIENTATION: LEFT

## 2025-01-29 ASSESSMENT — ACTIVITIES OF DAILY LIVING (ADL): ADL_ASSISTANCE: INDEPENDENT

## 2025-01-29 ASSESSMENT — PAIN DESCRIPTION - LOCATION
LOCATION: HIP
LOCATION: HIP

## 2025-01-29 NOTE — PROGRESS NOTES
Physical Therapy Evaluation & Treatment    Patient Name: Mckenna Ferguson  MRN: [id number]  Department: [facility]  Room: [address] [place] [address]A  Today's Date: 1/29/2025   Time Calculation  Start Time: 1104  Stop Time: 1147  Time Calculation (min): 43 min    Assessment/Plan   PT Assessment  PT Assessment Results: Decreased strength, Decreased range of motion, Decreased endurance, Impaired balance, Decreased mobility, Decreased coordination, Decreased cognition, Decreased safety awareness, Orthopedic restrictions, Pain  Rehab Prognosis: Fair  Barriers to Discharge Home: Physical needs  Physical Needs: 24hr mobility assistance needed, 24hr ADL assistance needed  Evaluation/Treatment Tolerance: Treatment limited secondary to medical complications (Comment) (limited by pain and balance deficits)  Medical Staff Made Aware: Yes  Strengths: Access to adaptive/assistive products, Insight into problems, Rehab experience, Attitude of self  Barriers to Participation: Ability to acquire knowledge, Comorbidities, Housing layout, Support of extended family/friends, Support and attitude of living partners  End of Session Communication: Bedside nurse, Care Coordinator, Charge Nurse  Assessment Comment: PT eval moderate with evolving status, monitoring of vitals, and Pmhx complexity. Pt presenting to eval with deficits in functional mobility, impaired balance, impaired strength, and increased pain level. Pt with limited endurance as well for tasks and displaying L drop foot which is impacting her balance as well. Pt with increased tremors during session and needing continuous cueing for activity safety/hand placement as she has poor carry over of teachings. Pt understanding of hip precautions and maintained them during session. Pt understanding of HEP and completed ther ex during session. PT recommends SNF placement due to:   impaired balance, mobility, endurance, and strength. She is not safe to return home at this time due to difficult home set up,  limited assist at home, and need for cueing for safety movements. OT evaluation needed as well as next location for improvement in ADLs.     End of Session Patient Position: Up in chair, Alarm on (ice on L hip, call bell in reach, SCDs on)   IP OR SWING BED PT PLAN  Inpatient or Swing Bed: Inpatient  PT Plan  Treatment/Interventions: Bed mobility, Transfer training, Gait training, Strengthening, Endurance training, Range of motion, Therapeutic activity, Home exercise program, Positioning  PT Plan: Ongoing PT  PT Frequency: BID  PT Discharge Recommendations: Moderate intensity level of continued care  Equipment Recommended upon Discharge: Wheeled walker, Straight cane  PT Recommended Transfer Status: Assist x2, Assistive device  PT - OK to Discharge:  (yes to SNF)      Subjective     General Visit Information:  General  Reason for Referral: [place] [facility] [place] [facility] L posterior THR by Dr. Cohen  Past Medical History Relevant to Rehab: Afib, HTN, HLD, OA, neutrophilia, dysphagia, gerd, dizziness, RA, shingles, squamous and basal cell carcinoma, gout, cholecystectomy, laminectomy, lipoma excision  Family/Caregiver Present: No  Prior to Session Communication: Bedside nurse  Patient Position Received: Bed, 3 rail up  General Comment: cleared by RN and agreeable to session  Home Living:  Home Living  Type of Home: House  Lives With: Adult children  Home Adaptive Equipment: Walker rolling or standard, Cane  Home Layout: Two level, Stairs to alternate level with rails  Alternate Level Stairs-Number of Steps: 13 steps  Home Access: Stairs to enter with rails  Entrance Stairs-Number of Steps: 3 steps with railing  Home Living Comments: son will not be home during the day but he will be home at night  Prior Level of Function:  Prior Function Per Pt/Caregiver Report  Receives Help From: Family  ADL Assistance: Independent  Homemaking Assistance: Independent  Ambulatory Assistance: Needs assistance  Transfers:  Independent, Assistive device  Gait: Independent, Assistive device  Stairs: Assistive device  Prior Function Comments: no falls within last 6 months however has been unsteady  Precautions:  Precautions  LE Weight Bearing Status: Weight Bearing as Tolerated  Medical Precautions: Fall precautions  Post-Surgical Precautions: Left hip precautions     Date/Time Vitals Session Patient Position Pulse Resp SpO2 BP MAP (mmHg)    01/29/25 1135 --  --  86  16  90 %  100/56  71                Objective   Pain:  Pain Assessment  Pain Assessment: 0-10  0-10 (Numeric) Pain Score: 7  Pain Type: Surgical pain  Pain Location: Hip  Pain Orientation: Left  Pain Descriptors: Sharp, Tender, Aching  Pain Interventions: Cold applied, Rest, Repositioned, Emotional support  Response to Interventions: Provider notified  Cognition:  Cognition  Overall Cognitive Status: Within Functional Limits  Attention: Within Functional Limits  Memory: Exceptions to WFL  Problem Solving: Exceptions to WFL  Complex Functional Tasks: Impaired  Safety/Judgement: Exceptions to WFL  Impulsive: Within functional limits  Processing Speed: Delayed    General Assessments:  General Observation  General Observation: dressing dry and intact on L hip, L drop foot noted               Activity Tolerance  Endurance: Tolerates 10 - 20 min exercise with multiple rests    Sensation  Light Touch: No apparent deficits            Perception  Inattention/Neglect: Appears intact  Initiation: Appears intact  Motor Planning: Appears intact  Perseveration: Not present      Coordination  Movements are Fluid and Coordinated: No  Lower Body Coordination: generally deconditioned and impaired movement from post op limitations    Postural Control  Postural Control: Impaired (rounded shoulders and trunk in standing)    Static Sitting Balance  Static Sitting-Balance Support: Feet supported  Static Sitting-Level of Assistance: Close supervision  Dynamic Sitting Balance  Dynamic  Sitting-Balance Support: Feet supported  Dynamic Sitting-Level of Assistance: Close supervision    Static Standing Balance  Static Standing-Balance Support: Bilateral upper extremity supported  Static Standing-Level of Assistance: Minimum assistance  Static Standing-Comment/Number of Minutes: with RW  Dynamic Standing Balance  Dynamic Standing-Balance Support: Bilateral upper extremity supported  Dynamic Standing-Level of Assistance: Minimum assistance, Moderate assistance  Dynamic Standing-Comments: with RW  Functional Assessments:  Bed Mobility  Bed Mobility: Yes  Bed Mobility 1  Bed Mobility 1: Supine to sitting, Scooting  Level of Assistance 1: Moderate assistance  Bed Mobility Comments 1: needing trunk support and assist with LLE to EOB    Transfers  Transfer: Yes  Transfer 1  Technique 1: Sit to stand, Stand to sit  Transfer Device 1: Walker  Transfer Level of Assistance 1: Minimum assistance  Trials/Comments 1: max verbal and tactile cues for hand placement; unsteadiness noted without LOB but PT providing continous support for stability.    Ambulation/Gait Training  Ambulation/Gait Training Performed: Yes  Ambulation/Gait Training 1  Surface 1: Level tile  Device 1: Rolling walker  Assistance 1: Minimum assistance, Moderate assistance, Moderate tactile cues, Maximum verbal cues  Quality of Gait 1: Diminished heel strike, Decreased step length, Foot drop/steppage gait, Antalgic, Soft knee(s)  Comments/Distance (ft) 1: ambulated 12 feetx1, 12 feetx1, 10 feetx1 with increased unsteadiness requiring continuous PT contact for safety and stabilization with 2 episodes of LOB where PT assisted patient. Pt showing L drop foot, ataxic stepping pattern and needing cueing to maintain body within walker; stepping patter, adeel, and hand placement on walker. poor carryover wit hactivity  Extremity/Trunk Assessments:  RUE   RUE : Within Functional Limits  LUE   LUE: Within Functional Limits  RLE   RLE : Exceptions to  WFL  Strength RLE  RLE Overall Strength: Greater than or equal to 3/5 as evidenced by functional mobility  LLE   LLE : Exceptions to WFL  AROM LLE (degrees)  LLE AROM Comment: AROM hip flexion <45 degrees  Strength LLE  L Hip Flexion: 3/5  L Knee Flexion: 3/5  L Knee Extension: 3/5  L Ankle Dorsiflexion: 0/5  L Ankle Plantar Flexion: 3+/5  Treatments:  Therapeutic Exercise  Therapeutic Exercise Performed: Yes  Therapeutic Exercise Activity 1: ankle pump x10 LLE with assist due to L foot drop  Therapeutic Exercise Activity 2: quad set x10 with cueing  Therapeutic Exercise Activity 3: glute set x10 with cueing  Therapeutic Exercise Activity 4: heel slide x10 with assist  Therapeutic Exercise Activity 5: hip abduction x10 with assist  Therapeutic Exercise Activity 6: SAQ x10 with cueing    Therapeutic Activity  Therapeutic Activity Performed: Yes  Therapeutic Activity 1: pt toileted with assist for cleaning as well as navigating transfers to and from commode  Outcome Measures:  [facility] Basic Mobility  Turning from your back to your side while in a flat bed without using bedrails: A little  Moving from lying on your back to sitting on the side of a flat bed without using bedrails: A lot  Moving to and from bed to chair (including a wheelchair): A lot  Standing up from a chair using your arms (e.g. wheelchair or bedside chair): A lot  To walk in hospital room: A lot  Climbing 3-5 steps with railing: A lot  Basic Mobility - Total Score: 13    Encounter Problems       Encounter Problems (Active)       Balance       LTG - Patient will maintain standing and sitting balance to allow for completion of daily activities (Progressing)       Start:  01/29/25               Mobility       LTG - Patient will recall and demonstrate 3 out of 3 total hip precautions during mobility (Progressing)       Start:  01/29/25            LTG - Patient will navigate 3 steps with railing and cane at Dsx1 (Progressing)       Start:  01/29/25             STG - Patient will ambulate 100 feetx1 with Rw at Dsx1 (Progressing)       Start:  01/29/25               PT Transfers       LTG - Patient will transfer from one surface to another with RW at Dsx1 (Progressing)       Start:  01/29/25               Pain - Adult          Safety       STG - Patient uses call light consistently to request assistance with transfers (Progressing)       Start:  01/29/25                   Education Documentation  Handouts, taught by Jacy Mukherjee PT at 1/29/2025 12:08 PM.  Learner: Patient  Readiness: Acceptance  Method: Explanation, Demonstration, Handout  Response: Verbalizes Understanding, Demonstrated Understanding    Precautions, taught by Jacy Mukherjee PT at 1/29/2025 12:08 PM.  Learner: Patient  Readiness: Acceptance  Method: Explanation, Demonstration, Handout  Response: Verbalizes Understanding, Demonstrated Understanding    Body Mechanics, taught by Jacy Mukherjee PT at 1/29/2025 12:08 PM.  Learner: Patient  Readiness: Acceptance  Method: Explanation, Demonstration, Handout  Response: Verbalizes Understanding, Demonstrated Understanding    Home Exercise Program, taught by Jacy Mukherjee PT at 1/29/2025 12:08 PM.  Learner: Patient  Readiness: Acceptance  Method: Explanation, Demonstration, Handout  Response: Verbalizes Understanding, Demonstrated Understanding    Mobility Training, taught by Jacy Mukherjee PT at 1/29/2025 12:08 PM.  Learner: Patient  Readiness: Acceptance  Method: Explanation, Demonstration, Handout  Response: Verbalizes Understanding, Demonstrated Understanding    Education Comments  No comments found.    Jacy Mukherjee, PT, DPT

## 2025-01-29 NOTE — PROGRESS NOTES
Pt POD#1 left hip replacement.  PT ne completed-Recommending SNF.  TCC in to discuss plan and give careport packet of facilities to review and choose.   01/29/25 1131   Discharge Planning   Home or Post Acute Services Post acute facilities (Rehab/SNF/etc)   Type of Post Acute Facility Services Skilled nursing

## 2025-01-29 NOTE — NURSING NOTE
Patient now awake. Assisted patient up to bathroom. Dressing to left hip clean/dry/intact.  Patient c/o left hip pain which she rates at 6/10.  Continue to monitor.

## 2025-01-29 NOTE — CARE PLAN
Problem: Balance  Goal: LTG - Patient will maintain standing and sitting balance to allow for completion of daily activities  Outcome: Progressing     Problem: Mobility  Goal: LTG - Patient will recall and demonstrate 3 out of 3 total hip precautions during mobility  Outcome: Progressing  Goal: LTG - Patient will navigate 3 steps with railing and cane at Dsx1  Outcome: Progressing  Goal: STG - Patient will ambulate 100 feetx1 with Rw at Dsx1  Outcome: Progressing     Problem: Safety  Goal: STG - Patient uses call light consistently to request assistance with transfers  Outcome: Progressing     Problem: PT Transfers  Goal: LTG - Patient will transfer from one surface to another with RW at Dsx1  Outcome: Progressing

## 2025-01-29 NOTE — PROGRESS NOTES
Mckenna Ferguson is a 84 y.o. female on day 0 of admission presenting with Unilateral primary osteoarthritis, left hip.      Subjective   The patient reports her postoperative pain is adequately controlled.  She is tolerating physical therapy.  She denies dizziness chest pain or shortness of breath.       Objective     Last Recorded Vitals  /56 (BP Location: Right arm, Patient Position: Sitting)   Pulse 86   Temp 36.2 °C (97.2 °F) (Temporal)   Resp 16   Wt 76.7 kg (169 lb)   SpO2 90%   Intake/Output last 3 Shifts:    Intake/Output Summary (Last 24 hours) at 1/29/2025 1240  Last data filed at 1/29/2025 0900  Gross per 24 hour   Intake 710 ml   Output 300 ml   Net 410 ml       Admission Weight  Weight: 76.7 kg (169 lb) (01/28/25 0902)    Daily Weight  01/28/25 : 76.7 kg (169 lb)    Image Results  XR hip left with pelvis when performed 2 or 3 views  Narrative: Interpreted By:  Princess Pineda,   STUDY:  Single view pelvis.  Left hip, two views.      INDICATION:  Signs/Symptoms:PRE-OP XRAYS TO BE DONE DURING PAT AT [facility] OR [facility] [place] [facility] ONLY.      COMPARISON:  12/12/2024.      ACCESSION NUMBER(S):  [id number]      ORDERING CLINICIAN:  JONATHAN CEE      FINDINGS:  No acute fracture or malalignment.  Severe left hip osteoarthrosis with joint space loss and osteophytes.  Changes of right hip arthroplasty again noted with intact hardware.  Soft tissues are within normal limits.      Impression: 1. Severe left hip osteoarthrosis.  2. Intact right hip arthroplasty hardware.      MACRO:  None.      Signed by: Princess Pineda 1/25/2025 12:18 PM  Dictation workstation:   [id number]      Physical Exam  Constitutional:       Appearance: Normal appearance.   HENT:      Head: Normocephalic and atraumatic.      Nose: Nose normal.      Mouth/Throat:      Mouth: Mucous membranes are moist.      Pharynx: Oropharynx is clear.   Eyes:      Extraocular Movements: Extraocular movements intact.       Conjunctiva/sclera: Conjunctivae normal.      Pupils: Pupils are equal, round, and reactive to light.   Cardiovascular:      Rate and Rhythm: Normal rate and regular rhythm.      Pulses: Normal pulses.      Heart sounds: Normal heart sounds.   Pulmonary:      Effort: Pulmonary effort is normal.      Breath sounds: Normal breath sounds.   Abdominal:      General: Abdomen is flat. Bowel sounds are normal.      Palpations: Abdomen is soft.      Tenderness: There is no abdominal tenderness.   Musculoskeletal:         General: Normal range of motion.      Cervical back: Normal range of motion and neck supple.   Skin:     General: Skin is warm and dry.   Neurological:      General: No focal deficit present.      Mental Status: She is alert and oriented to person, place, and time.   Psychiatric:         Mood and Affect: Mood normal.         Behavior: Behavior normal.         Thought Content: Thought content normal.         Relevant Results                                Assessment & Plan  Unilateral primary osteoarthritis, left hip  The patient is doing well postoperatively.  Postoperative plan including DVT prophylaxis, pain medications, physical therapy and Occupational Therapy orders per orthopedic surgery.  Hypertension  Blood pressures adequately controlled postoperatively.  Patient will continue present medications on discharge.  Hypothyroidism  This is controlled by history.  Continue present medications on discharge.  Pure hypercholesterolemia  This is controlled by history.  Continue present medications on discharge.  Gout, unspecified  She is asymptomatic.  Continue present medications on discharge.  S/P total left hip arthroplasty    Plan as above              Ranjeet Schaeffer MD

## 2025-01-29 NOTE — NURSING NOTE
Assumed care of patient. Report received from HARRIS Nelson. Patient lying in bed watching TV. Vital signs stable, no acute distress. Surgical dressing to left hip is clean, dry and intact with ice man cooler in place. Has not yet ambulated or voided since surgery - will continue to monitor. Denies need for pain medication at this time. No stated needs. Call light is within reach and bed alarm is activated.

## 2025-01-29 NOTE — CARE PLAN
[facility] ([facility]) able to accept patient.  Will request DSC team to initiate prior auth.  TCC will update when results received.

## 2025-01-29 NOTE — ASSESSMENT & PLAN NOTE
The patient is doing well postoperatively.  Postoperative plan including DVT prophylaxis, pain medications, physical therapy and Occupational Therapy orders per orthopedic surgery.

## 2025-01-29 NOTE — OP NOTE
ARTHROPLASTY, HIP, TOTAL, WITHOUT CEMENT ( DePuy Mousie Cup, DePuy Jefferson Stem ) ** Overnight** (L) Operative Note     Date: 2025  OR Location: [facility]    Name: Mckenna Ferguson, : 1940, Age: 84 y.o., MRN: [id number], Sex: female    Diagnosis  Pre-op Diagnosis      * Unilateral primary osteoarthritis, left hip [M16.12] Post-op Diagnosis     * Unilateral primary osteoarthritis, left hip [M16.12]     Procedures  ARTHROPLASTY, HIP, TOTAL, WITHOUT CEMENT ( DePuy Mousie Cup, DePuy Jefferson Stem ) ** Overnight**  [id number] - MD ARTHRP ACETBLR/PROX FEM PROSTC AGRFT/ALGRFT      Surgeons      * Wilfrid Cohen - Primary    Resident/Fellow/Other Assistant:  Surgeons and Role:  * No surgeons found with a matching role *    Staff:   Circulator: Bernice Arriaga Person: Anne Marie Arriaga Person: Shilpa    Anesthesia Staff: Anesthesiologist: Hal Avery MD  C-AA: DEBRA Matta    Procedure Summary  Anesthesia: General  ASA: II  Estimated Blood Loss: 100mL  Intra-op Medications:   Administrations occurring from 1035 to 1305 on 25:   Medication Name Total Dose   ropivacaine-epinephrine-clonidine-ketorolac 2.46-0.005- 0.0008-0.3mg/mL periarticular syringe 50 mL   ceFAZolin (Ancef) 2 g in dextrose (iso)  mL 2 g   fentaNYL PF (Sublimaze) injection 50 mcg 50 mcg   fentaNYL (Sublimaze) injection 50 mcg/mL 100 mcg   LR infusion Cannot be calculated   lidocaine PF (Xylocaine-MPF) local injection 1 % 50 mg   ondansetron (Zofran) 2 mg/mL injection 4 mg   propofol (Diprivan) injection 10 mg/mL 500 mg   rocuronium (ZeMuron) 50 mg/5 mL injection 40 mg   sugammadex (Bridion) 200 mg/2 mL injection 200 mg   tranexamic acid injection 100 mg/mL 2,000 mg              Anesthesia Record               Intraprocedure I/O Totals          Intake    Tranexamic Acid 20.00 mL    The total shown is the total volume documented since Anesthesia Start was filed.    LR infusion 650.00 mL    ceFAZolin (Ancef) 2 g in dextrose (iso)  mL  100.00 mL    Total Intake 770 mL       Output    Est. Blood Loss 100 mL    Total Output 100 mL       Net    Net Volume 670 mL          Specimen: No specimens collected         Implants:  Implants       Type Name Action Serial No.      Joint Hip ACETABULAR CUP, SECTOR, GRIPTON, SIZE 54MM - [id number] Implanted      Screw SCREW CANCELLOUS 6.5 X 30 - [id number] Implanted      Screw SCREW CANCELLOUS 6.5 X 25 - [id number] Implanted       54/47MM X 47/28MM PINNACLE ACETABULAR SHELL W/ BI-MENTUM DUAL MOBILITY LINER, PE Implanted       SIZE 3, C-STEM AMT HIP IMPLANT, STANDARD OFFSET Implanted      Joint Hip CEMENTRALIZER, FEMORAL, STEM CENTRALIZER, 9.25MM - [id number] Wasted       12MM C-STEM VOID CENTRALIZER Implanted      Joint Hip HEAD, FEMORAL,CERAMIC 28+1.5 - [id number] Implanted       47MM X 28MM BI-MENTUM ALTRX ACETABULAR LINER Implanted      Implant CEMENT, BONE, SIMPLEX P, RADIOPAQUE, FULL DOSE, 40 GM - [id number] Implanted      Implant CEMENT, BONE, SIMPLEX P, RADIOPAQUE, FULL DOSE, 40 GM - [id number] Implanted      Implant CEMENT, BONE, SIMPLEX P, RADIOPAQUE, FULL DOSE, 40 GM - [id number] Implanted               Findings: severe OA    Indications: Mckenna Ferguson is an 84 y.o. female who is having surgery for Unilateral primary osteoarthritis, left hip [M16.12].     The patient was seen in the preoperative area. The risks, benefits, complications, treatment options, non-operative alternatives, expected recovery and outcomes were discussed with the patient. The possibilities of reaction to medication, pulmonary aspiration, injury to surrounding structures, bleeding, recurrent infection, the need for additional procedures, failure to diagnose a condition, and creating a complication requiring transfusion or operation were discussed with the patient. The patient concurred with the proposed plan, giving informed consent.  The site of surgery was properly noted/marked if necessary per policy. The patient has been  actively warmed in preoperative area. Preoperative antibiotics have been ordered and given within 1 hours of incision. Venous thrombosis prophylaxis have been ordered including bilateral sequential compression devices    Procedure Details: L MERCEDEZ  Complications:  None; patient tolerated the procedure well.    Disposition: PACU - hemodynamically stable.  Condition: stable     PREOPERATIVE DIAGNOSIS:  left hip osteoarthritis     POSTOPERATIVE DIAGNOSIS:  left hip osteoarthritis     OPERATION/PROCEDURE:  left total hip arthroplasty     SURGEON:  Wilfrid Cohen MD     ASSISTANT(S):  Lizeth Travis MD PGY4     ANESTHESIA:  Spinal.     ESTIMATED BLOOD LOSS AND INTRAVENOUS FLUIDS:  General     LOCATION:  [facility] [place]     BRIEF CLINICAL NOTE:  The patient is a 83 yo female with severe radiographic  osteoarthritis of the left hip.  They failed conservative treatment  and wished to proceed with total hip arthroplasty, which is indicated  at this time.  We discussed the risks, benefits, alternatives of  surgery including, but not limited to, infection, damage to vessel or  nerve, bleeding, soft tissue pain, DVT, PE, problems with anesthesia,  leg length discrepancy, dislocation, continued soft tissue pain, lack  of range of motion, need for further surgery, etc.  Consent was  obtained.  They were taken to the operating room in order to undergo  the procedure.     OPERATIVE REPORT:  The patient was transferred to the operating room table.  Time-out  was performed confirming patient name, medical record number,  surgical site, and adequate and appropriate imaging.  The patient  received appropriate IV antibiotics as well as tranexamic acid prior  to the start of the procedure.  Once we prepped and draped,  posterolateral approach to the hip was performed.  The skin and  subcutaneous tissues were incised sharply.  Hemostasis was obtained  using electrocautery.  The underlying gluteal fascia was identified  and entered using  electrocautery followed by Ware scissors.  Charnley  bow was placed.  The short external rotators and capsule were taken  down in one piece and tagged for later reapproximation making sure to protect the sciatic nerve.  The hip was dislocated.  Provisional femoral neck cut was performed.  The femoral head was removed.  They had extensive degenerative changes bipolarly.  The acetabulum was exposed.  We reamed until we had interference fit and placed a Gription shell in appropriate anteversion and inclination.  Two screws were placed through the cup in the pelvis.  Dual mobility trial liner was placed.  We turned our attention back to the femur.  The femur was sequentially broached until we had proximal fit and fill. We then trialed with multiple neck lengths and offsets.  They were stable in position of sleep, flexion, internalrotation, did not impinge in external rotation.  We obtained an intraoperative x-ray.  I was happy with theposition of the components and the stability of the hip.  All trial components were removed.  The wound was thoroughly irrigated.  The real dual mobility liner was placed.  We then cemented the C-stem using modern cement technique. The dual mobility head was assembled. The hip was relocated.  The short external rotators and capsule were reapproximated to the trochanter through drill holes  using #5 Ethibond.  The surrounding soft tissue was injected with CROW solution the fascia was closed with interrupted 0 Vicryl.  The subcu was closed with interrupted 2-0 Vicryl, and the skin was closed running 3-0 Biosyn followed by Dermabond and Steri-Strips.  Dry  sterile dressing was placed.  The patient was transferred back to the hospital bed without evidence of complication.  They will be weightbearing as tolerated.  They will be on Xarelto and SCDs for DVT  prophylaxis.     Additional Details: Otis, WBAT    Attending Attestation: I was present and scrubbed for the key portions of the  procedure.

## 2025-01-29 NOTE — PROGRESS NOTES
Medication Education     Medication education for Mckenna Ferguson was provided to the patient  for the following medication(s):  Tylenol  Docusate  Oxycodone  Pantoprazole  Miralax  Tramadol  Xarelto    Medication education provided by a Pharmacist:  -Proper dose, indication, possible ADRs   -How the medication works and benefits of taking it  -Importance of compliance   -Potential duration of therapy    Identified potential barriers to education:  None    Method(s) of Education:  Verbal Written materials provided and reviewed    An opportunity to ask questions and receive answers was provided.     Assessment of understanding the patient :  2= meets goals/outcomes    Additional Notes (if applicable): Meds to beds delivered to patient.    Germania Whelan, TomyD

## 2025-01-29 NOTE — NURSING NOTE
Patient lying in bed and is voicing c/o left hip pain which she rates at 7/10. Medicated patient with Dilaudid 0.5mg IV.

## 2025-01-29 NOTE — CARE PLAN
The patient's goals for the shift include  pain control     The clinical goals for the shift include pain control / safety

## 2025-01-29 NOTE — NURSING NOTE
Patient has been admitted to room 217 from PACU.  Patient is alert and awake at this time. Patient is having pain 7/10 will medicate once orders are active. Patient has a past medical history of a fib, have xarelto on order to restart, has been on hold prior to surgery. Hip incision with meplix and iceman. Oriented patient to room, TV, menu and call light.

## 2025-01-29 NOTE — CARE PLAN
TCC followed up to receive SNF choices.  Referrals sent to the following:  -[facility]  -[facility]  -[facility]   Will follow up with acceptance/denials.  Pt will require prior auth before transitioning.

## 2025-01-29 NOTE — NURSING NOTE
Assumed care of patient this am. Patient sleeping and in no noted distress.   Call light within reach, continue to monitor.

## 2025-01-29 NOTE — CARE PLAN
Checked pt O2 after Physical Therapy session. Pt sating between 90 and 92% on RA. Reviewed IS with pt. After review of IS pt sating 95%. Encouraged pt to continue with IS during the times she is awake.

## 2025-01-29 NOTE — NURSING NOTE
Assisted patient to the restroom with walker and two person assist. Void x 1. Assisted back to bed and positioned for comfort with ice man cooler in place. Recently medicated for pain per doctor's orders. No stated needs. Call light is within reach and bed alarm is activated.

## 2025-01-29 NOTE — NURSING NOTE
Patient lying in bed. Morning labs drawn and sent to the lab. Vital signs stable. Denies need for pain medication at this time. No stated needs. Call light is within reach and bed alarm is activated.

## 2025-01-29 NOTE — PROGRESS NOTES
HOSPITALIST    Patient seen, chart reviewed.  Vital signs stable.  Patient awake laying in bed.  She was in out of bed and voided since surgery.  Her pain is currently controlled.  She is using incentive spirometry.

## 2025-01-29 NOTE — NURSING NOTE
Patient was given prn Zofran 4mg IVP for nausea at this time, education was provided and pt verbalizes understanding. No further needs noted, call light is within reach.

## 2025-01-29 NOTE — ASSESSMENT & PLAN NOTE
Blood pressures adequately controlled postoperatively.  Patient will continue present medications on discharge.

## 2025-01-30 ENCOUNTER — DOCUMENTATION (OUTPATIENT)
Dept: HOME HEALTH SERVICES | Facility: HOME HEALTH | Age: 85
End: 2025-01-30
Payer: MEDICARE

## 2025-01-30 VITALS
TEMPERATURE: 98.1 F | SYSTOLIC BLOOD PRESSURE: 126 MMHG | WEIGHT: 169 LBS | HEIGHT: 66 IN | BODY MASS INDEX: 27.16 KG/M2 | OXYGEN SATURATION: 93 % | DIASTOLIC BLOOD PRESSURE: 58 MMHG | HEART RATE: 93 BPM | RESPIRATION RATE: 16 BRPM

## 2025-01-30 PROCEDURE — 7100000011 HC EXTENDED STAY RECOVERY HOURLY - NURSING UNIT

## 2025-01-30 PROCEDURE — 97110 THERAPEUTIC EXERCISES: CPT | Mod: GP

## 2025-01-30 PROCEDURE — 97116 GAIT TRAINING THERAPY: CPT | Mod: GP

## 2025-01-30 PROCEDURE — 2500000001 HC RX 250 WO HCPCS SELF ADMINISTERED DRUGS (ALT 637 FOR MEDICARE OP)

## 2025-01-30 PROCEDURE — 2500000001 HC RX 250 WO HCPCS SELF ADMINISTERED DRUGS (ALT 637 FOR MEDICARE OP): Mod: MUE | Performed by: HOSPITALIST

## 2025-01-30 PROCEDURE — 99232 SBSQ HOSP IP/OBS MODERATE 35: CPT | Performed by: INTERNAL MEDICINE

## 2025-01-30 PROCEDURE — 2500000004 HC RX 250 GENERAL PHARMACY W/ HCPCS (ALT 636 FOR OP/ED)

## 2025-01-30 RX ORDER — OXYCODONE HYDROCHLORIDE 5 MG/1
5 TABLET ORAL ONCE
Status: DISCONTINUED | OUTPATIENT
Start: 2025-01-30 | End: 2025-01-30 | Stop reason: HOSPADM

## 2025-01-30 RX ORDER — OXYCODONE AND ACETAMINOPHEN 5; 325 MG/1; MG/1
1 TABLET ORAL ONCE
Status: DISCONTINUED | OUTPATIENT
Start: 2025-01-30 | End: 2025-01-30 | Stop reason: HOSPADM

## 2025-01-30 RX ADMIN — OXYCODONE 5 MG: 5 TABLET ORAL at 07:29

## 2025-01-30 RX ADMIN — OXYCODONE 10 MG: 5 TABLET ORAL at 12:32

## 2025-01-30 RX ADMIN — POLYETHYLENE GLYCOL 3350 17 G: 17 POWDER, FOR SOLUTION ORAL at 08:29

## 2025-01-30 RX ADMIN — DOCUSATE SODIUM 100 MG: 100 CAPSULE, LIQUID FILLED ORAL at 08:29

## 2025-01-30 RX ADMIN — AMLODIPINE BESYLATE 10 MG: 5 TABLET ORAL at 08:30

## 2025-01-30 RX ADMIN — ATENOLOL 25 MG: 25 TABLET ORAL at 08:30

## 2025-01-30 RX ADMIN — PANTOPRAZOLE SODIUM 40 MG: 40 TABLET, DELAYED RELEASE ORAL at 06:39

## 2025-01-30 RX ADMIN — ALLOPURINOL 100 MG: 100 TABLET ORAL at 08:32

## 2025-01-30 RX ADMIN — ATORVASTATIN CALCIUM 10 MG: 20 TABLET, FILM COATED ORAL at 08:30

## 2025-01-30 RX ADMIN — LEVOTHYROXINE SODIUM 50 MCG: 0.03 TABLET ORAL at 06:39

## 2025-01-30 ASSESSMENT — COGNITIVE AND FUNCTIONAL STATUS - GENERAL
PERSONAL GROOMING: A LITTLE
STANDING UP FROM CHAIR USING ARMS: A LOT
DRESSING REGULAR UPPER BODY CLOTHING: A LITTLE
MOVING TO AND FROM BED TO CHAIR: A LOT
DRESSING REGULAR LOWER BODY CLOTHING: A LITTLE
STANDING UP FROM CHAIR USING ARMS: A LOT
WALKING IN HOSPITAL ROOM: A LOT
MOVING TO AND FROM BED TO CHAIR: A LOT
MOVING FROM LYING ON BACK TO SITTING ON SIDE OF FLAT BED WITH BEDRAILS: A LITTLE
CLIMB 3 TO 5 STEPS WITH RAILING: A LOT
MOBILITY SCORE: 13
HELP NEEDED FOR BATHING: A LITTLE
MOVING FROM LYING ON BACK TO SITTING ON SIDE OF FLAT BED WITH BEDRAILS: A LITTLE
CLIMB 3 TO 5 STEPS WITH RAILING: A LOT
TOILETING: A LITTLE
TURNING FROM BACK TO SIDE WHILE IN FLAT BAD: A LOT
TURNING FROM BACK TO SIDE WHILE IN FLAT BAD: A LITTLE
MOBILITY SCORE: 14
DAILY ACTIVITIY SCORE: 19
WALKING IN HOSPITAL ROOM: A LOT

## 2025-01-30 ASSESSMENT — PAIN DESCRIPTION - DESCRIPTORS
DESCRIPTORS: SORE
DESCRIPTORS: SHARP
DESCRIPTORS: SHARP
DESCRIPTORS: SORE
DESCRIPTORS: SHARP;SHOOTING
DESCRIPTORS: ACHING
DESCRIPTORS: ACHING;SORE;SHARP
DESCRIPTORS: SHARP;SHOOTING

## 2025-01-30 ASSESSMENT — PAIN SCALES - GENERAL
PAINLEVEL_OUTOF10: 6
PAINLEVEL_OUTOF10: 7
PAINLEVEL_OUTOF10: 7
PAINLEVEL_OUTOF10: 6
PAINLEVEL_OUTOF10: 5 - MODERATE PAIN
PAINLEVEL_OUTOF10: 4

## 2025-01-30 ASSESSMENT — PAIN - FUNCTIONAL ASSESSMENT
PAIN_FUNCTIONAL_ASSESSMENT: 0-10

## 2025-01-30 NOTE — CARE PLAN
The patient's goals for the shift include Pain control    The clinical goals for the shift include pain control and safe environment      Problem: Pain - Adult  Goal: Verbalizes/displays adequate comfort level or baseline comfort level  Outcome: Progressing     Problem: Safety - Adult  Goal: Free from fall injury  Outcome: Progressing     Problem: Discharge Planning  Goal: Discharge to home or other facility with appropriate resources  Outcome: Progressing     Problem: Chronic Conditions and Co-morbidities  Goal: Patient's chronic conditions and co-morbidity symptoms are monitored and maintained or improved  Outcome: Progressing     Problem: Nutrition  Goal: Nutrient intake appropriate for maintaining nutritional needs  Outcome: Progressing     Problem: Fall/Injury  Goal: Not fall by end of shift  Outcome: Progressing  Goal: Be free from injury by end of the shift  Outcome: Progressing  Goal: Verbalize understanding of personal risk factors for fall in the hospital  Outcome: Progressing  Goal: Verbalize understanding of risk factor reduction measures to prevent injury from fall in the home  Outcome: Progressing  Goal: Use assistive devices by end of the shift  Outcome: Progressing  Goal: Pace activities to prevent fatigue by end of the shift  Outcome: Progressing     Problem: Pain  Goal: Takes deep breaths with improved pain control throughout the shift  Outcome: Progressing  Goal: Turns in bed with improved pain control throughout the shift  Outcome: Progressing  Goal: Walks with improved pain control throughout the shift  Outcome: Progressing  Goal: Performs ADL's with improved pain control throughout shift  Outcome: Progressing  Goal: Participates in PT with improved pain control throughout the shift  Outcome: Progressing  Goal: Free from opioid side effects throughout the shift  Outcome: Progressing  Goal: Free from acute confusion related to pain meds throughout the shift  Outcome: Progressing     Problem: Deep  Vein Thrombosis  Goal: I will remain free from complications of deep vein thrombosis and maintain current level of mobility  Outcome: Progressing     Problem: Skin  Goal: Participates in plan/prevention/treatment measures  Outcome: Progressing  Goal: Promote skin healing  Outcome: Progressing

## 2025-01-30 NOTE — PROGRESS NOTES
Physical Therapy Treatment    Patient Name: Mckenna Ferguson  MRN: [id number]  Department: [facility]  Room: [address] [place] [address]  Today's Date: 1/30/2025  Time Calculation  Start Time: 1055  Stop Time: 1122  Time Calculation (min): 27 min         Assessment/Plan   PT Assessment  PT Assessment Results: Decreased strength, Decreased range of motion, Decreased endurance, Impaired balance, Decreased mobility, Decreased coordination, Decreased cognition, Decreased safety awareness, Orthopedic restrictions, Pain  Rehab Prognosis: Fair  Barriers to Discharge Home: Physical needs, Cognition needs  Cognition Needs: 24hr supervision for safety awareness needed  Physical Needs: 24hr mobility assistance needed, 24hr ADL assistance needed  Evaluation/Treatment Tolerance: Patient limited by pain  Medical Staff Made Aware: Yes  End of Session Communication: Bedside nurse  Assessment Comment: Pt slowly progressing towards goals with motivation for improvement however shecontinues to require assist x1-2 for all mobility including bed mobility,transfers, and ambulation due to balance impairment, endurance limitations, and strength limitations.   Pt requiring cueing for safety with use of walker and to maintain hip precautions.  Pt reports  understanding hip precautions. Pt understanding of HEP and completed ther ex with assist from PT. PT recommends SNF placement due to:  impaired balance, mobility, endurance, and strength. She is not safe to return home at this time with her difficult home set up, limited assistance at home, and high fallrisk status especially with limited cognition. PT recommending OT evaluation at next level of care as well.     End of Session Patient Position: Bed, 3 rail up, Alarm on (ice on L hip)     PT Plan  Treatment/Interventions: Bed mobility, Transfer training, Gait training, Strengthening, Endurance training, Range of motion, Therapeutic activity, Home exercise program, Positioning  PT Plan: Ongoing PT  PT Frequency:  Daily  PT Discharge Recommendations: Moderate intensity level of continued care  Equipment Recommended upon Discharge: Wheeled walker  PT Recommended Transfer Status: Assist x2, Assistive device  PT - OK to Discharge: Yes (to SNF)      General Visit Information:   PT  Visit  PT Received On: 01/30/25  Response to Previous Treatment: Patient reporting fatigue but able to participate., Not compliant with home exercise program  General  Reason for Referral: L posterior THR  Referred By: Noel  Family/Caregiver Present: No  Prior to Session Communication: Bedside nurse  Patient Position Received: Bed, 3 rail up  General Comment: cleared by RN and agreeable to session    Subjective   Precautions:  Precautions  LE Weight Bearing Status: Weight Bearing as Tolerated  Medical Precautions: Fall precautions  Post-Surgical Precautions: Left hip precautions  Precautions Comment: posterior hip precautions     Date/Time Vitals Session Patient Position Pulse Resp SpO2 BP MAP (mmHg)    01/30/25 1226 --  --  93  16  93 %  126/58  81                 Objective   Pain:  Pain Assessment  Pain Assessment: 0-10  0-10 (Numeric) Pain Score: 6  Pain Type: Surgical pain  Pain Location: Hip  Pain Orientation: Left  Pain Descriptors: Aching, Sore, Sharp  Pain Frequency: Intermittent  Pain Onset: Ongoing  Pain Interventions: Cold applied, Rest, Repositioned, Emotional support, Distraction  Response to Interventions: No change in pain  Cognition:  Cognition  Attention: Within Functional Limits  Memory: Exceptions to WFL  Short-Term Memory:  (appears pleasantly confused)  Problem Solving: Exceptions to WFL  Complex Functional Tasks: Impaired (requiring continuous, consecutive step by step instruction for tasks)  Numeric Reasoning: Exceptions to WFL   Safety/Judgement: Exceptions to WFL  Novel Situations: Maximal  Routine Tasks: Minimal  Unable to Self-Monitor and Self-Correct Consistently: Maximal  Insight: Moderate  Flexibility of Thought:  Reduced flexibility  Planning: Reduced planning skills  Processing Speed: Delayed     Activity Tolerance:  Activity Tolerance  Endurance: Tolerates 10 - 20 min exercise with multiple rests  Treatments:  Therapeutic Exercise  Therapeutic Exercise Performed: Yes  Therapeutic Exercise Activity 1: ankle pump x30 with assist for LLE due to foot drop  Therapeutic Exercise Activity 2: quad set x15  Therapeutic Exercise Activity 3: glute setx15  Therapeutic Exercise Activity 4: heel slide with assist x15  Therapeutic Exercise Activity 5: hip abduction x15 with assist  Therapeutic Exercise Activity 6: SAQ x16    Therapeutic Activity  Therapeutic Activity Performed: Yes  Therapeutic Activity 1: pt toileted without assist for hygiene or hand hygiene however PT providing min a x1 for balance/stabilization while patient stood to complete task at sink    Bed Mobility  Bed Mobility: Yes  Bed Mobility 1  Bed Mobility 1: Supine to sitting, Sitting to supine, Scooting  Level of Assistance 1: Minimum assistance, Moderate assistance  Bed Mobility Comments 1: trunk assist mod A x1 and BLE lift to EOB and up into bed Sesar x1    Ambulation/Gait Training  Ambulation/Gait Training Performed: Yes  Ambulation/Gait Training 1  Surface 1: Level tile  Device 1: Rolling walker  Gait Support Devices: Gait belt  Assistance 1: Minimum assistance, Maximum verbal cues, Moderate tactile cues  Quality of Gait 1: Diminished heel strike, Decreased step length, Foot drop/steppage gait, Forward flexed posture, Ataxic, Antalgic, Soft knee(s)  Comments/Distance (ft) 1: 2 person assist - ambulated 12 feetx1, rested then 12 feetx1. non reciprocal stepping. PT continuously assisting with walker progression and cueing for maintaining body within walker however poor carry over of cueing.  Transfers  Transfer: Yes  Transfer 1  Technique 1: Sit to stand, Stand to sit  Transfer Device 1: Walker, Gait belt  Transfer Level of Assistance 1: Minimum assistance, Moderate  verbal cues, +2  Trials/Comments 1: from bed x1, from commode x1 with assistance x2 for activity for safety    Outcome Measures:  [facility] Basic Mobility  Turning from your back to your side while in a flat bed without using bedrails: A little  Moving from lying on your back to sitting on the side of a flat bed without using bedrails: A lot  Moving to and from bed to chair (including a wheelchair): A lot  Standing up from a chair using your arms (e.g. wheelchair or bedside chair): A lot  To walk in hospital room: A lot  Climbing 3-5 steps with railing: A lot  Basic Mobility - Total Score: 13    Education Documentation  No documentation found.  Education Comments  No comments found.        OP EDUCATION:  Outpatient Education  Individual(s) Educated: Patient  Education Provided: Anatomy, Body Mechanics, Fall Risk, Home Exercise Program, Post-Op Precautions, Posture  Equipment: Ice Pack  Risk and Benefits Discussed with Patient/Caregiver/Other: yes  Patient/Caregiver Demonstrated Understanding: yes  Plan of Care Discussed and Agreed Upon: yes  Patient Response to Education: Patient/Caregiver Verbalized Understanding of Information, Patient/Caregiver Performed Return Demonstration of Exercises/Activities, Patient/Caregiver Asked Appropriate Questions    Encounter Problems       Encounter Problems (Active)       Balance       LTG - Patient will maintain standing and sitting balance to allow for completion of daily activities (Progressing)       Start:  01/29/25               Mobility       LTG - Patient will recall and demonstrate 3 out of 3 total hip precautions during mobility (Progressing)       Start:  01/29/25            LTG - Patient will navigate 3 steps with railing and cane at Dsx1 (Progressing)       Start:  01/29/25            STG - Patient will ambulate 100 feetx1 with Rw at Dsx1 (Progressing)       Start:  01/29/25               PT Transfers       LTG - Patient will transfer from one surface to another with RW  at Dsx1 (Progressing)       Start:  01/29/25               Pain - Adult          Safety       STG - Patient uses call light consistently to request assistance with transfers (Progressing)       Start:  01/29/25               Jacy Mukherjee, PT, DPT

## 2025-01-30 NOTE — PROGRESS NOTES
HOSPITALIST    Patient seen, chart reviewed.  Vital signs stable.  Patient awake laying in bed.  Mildly confused this evening.  Pain is controlled.  Patient is anticipated for discharge to skilled nursing facility pending precertification.

## 2025-01-30 NOTE — NURSING NOTE
Patient in bed sleeping; no signs of distress or discomfort. Bed in low and locked position; call light within reach; bed alarm activated. Will continue to monitor treatment progress.

## 2025-01-30 NOTE — NURSING NOTE
Vitals:    01/29/25 1621   BP: 127/66   Pulse: 83   Resp: 16   Temp: 36.3 °C (97.3 °F)   SpO2: 92%     Assume care of the patient. Patient had L hip: Dr. Cohen. Patient in bed. Patient a little confused, but redirectable. Reviewed the plan of care. Repositioned patient for comfort; no complaint of pain. Bed in low and locked position; call light within reach; bed alarm activated.

## 2025-01-30 NOTE — NURSING NOTE
Pt. Vital signs obtained.  Pt. Order breakfast.  Call light within reach.  Bed alarm on.  Scd's on.

## 2025-01-30 NOTE — NURSING NOTE
Assumed care of patient this am.  Patient voicing c/o left hip  pain which she rates at 6/10. Assisted patient up out of bed to bedside commode. Moved recliner chair over near bathroom and patient sat in the chair as patient is very unsteady and would have difficulty ambulating over to chair if it remained near the wall. The chair was then moved back over to window.  Dressing to left hip clean/dry/intact.  Call light placed within reach, continue to monitor.

## 2025-01-30 NOTE — ASSESSMENT & PLAN NOTE
The patient is doing well postoperatively.  Postoperative plan including DVT prophylaxis, pain medications, physical therapy and Occupational Therapy orders per orthopedic surgery.  She will be discharged to skilled nursing facility for short-term rehabilitation today.

## 2025-01-30 NOTE — PROGRESS NOTES
Mckenna Ferguson is a 84 y.o. female on day 0 of admission presenting with Unilateral primary osteoarthritis, left hip.      Subjective   Patient reports he has significant right hip pain and oxycodone dose is adjusted by orthopedic surgery.  She has generalized weakness.  She is tolerating physical therapy but is having some difficulty because of the severe pain.  She denies dizziness chest pain or shortness of breath.       Objective     Last Recorded Vitals  /77 (BP Location: Right arm, Patient Position: Sitting)   Pulse 110   Temp 36.7 °C (98.1 °F) (Temporal)   Resp 18   Wt 76.7 kg (169 lb)   SpO2 93%   Intake/Output last 3 Shifts:    Intake/Output Summary (Last 24 hours) at 1/30/2025 1208  Last data filed at 1/30/2025 0856  Gross per 24 hour   Intake --   Output 1250 ml   Net -1250 ml       Admission Weight  Weight: 76.7 kg (169 lb) (01/28/25 0902)    Daily Weight  01/28/25 : 76.7 kg (169 lb)    Image Results  XR pelvis 1-2 views  Narrative: Interpreted By:  Igor Sánchez,   STUDY:  XR PELVIS 1-2 VIEWS; ;  1/28/2025 1:12 pm      INDICATION:  Signs/Symptoms:in PACU s/p MERCEDEZ.          COMPARISON:  01/28/2025      ACCESSION NUMBER(S):  [id number]      ORDERING CLINICIAN:  JONATHAN CEE      FINDINGS:  Postoperative portable view in PACU demonstrates left total hip  arthroplasty in place with immediate postsurgical changes. No  complication seen. No fracture or dislocation      Impression:     Interval left total hip arthroplasty without acute abnormality      MACRO:  None      Signed by: Igor Sánchez 1/30/2025 12:12 AM  Dictation workstation:   [id number]  XR pelvis 1-2 views  Narrative: Interpreted By:  Igor Sánchez,   STUDY:  XR PELVIS 1-2 VIEWS; ;  1/28/2025 11:41 am      INDICATION:  Signs/Symptoms:Left total hip.          COMPARISON:  None.      ACCESSION NUMBER(S):  [id number]      ORDERING CLINICIAN:  FRANCES SHAW      FINDINGS:  Pelvis, single portable view demonstrates left  total hip arthroplasty  in progress. No immediate complication seen      Impression: Intraoperative view for localization purposes      MACRO:  None      Signed by: Igor Sánchez 1/30/2025 12:09 AM  Dictation workstation:   [id number]      Physical Exam    Relevant Results               Assessment/Plan                  Assessment & Plan  Unilateral primary osteoarthritis, left hip  The patient is doing well postoperatively.  Postoperative plan including DVT prophylaxis, pain medications, physical therapy and Occupational Therapy orders per orthopedic surgery.  She will be discharged to skilled nursing facility for short-term rehabilitation today.  Hypertension  Blood pressures adequately controlled postoperatively.  Patient will continue present medications on discharge.  Hypothyroidism  This is controlled by history.  Continue present medications on discharge.  Pure hypercholesterolemia  This is controlled by history.  Continue present medications on discharge.  Gout, unspecified  She is asymptomatic.  Continue present medications on discharge.  S/P total left hip arthroplasty    Plan as above              Ranjeet Schaeffer MD

## 2025-01-30 NOTE — HH CARE COORDINATION
Home Care received a referral for Physical Therapy. Unfortunately, we are unable to accept and process the referral at this time.    Reason:  Patient is discharging to a Post-Acute Care Facility    Patients, please reach out to the referring provider or your PCP to assist in obtaining an alternative home care agency and/or guidance to meet your needs.    Providers, please reach out to  Home Care at [phone] with any questions regarding the declined referral.

## 2025-01-30 NOTE — PROGRESS NOTES
Auth received for [facility]   Patient to transition over today via [facility] stretcher.  Estimated  time 1300.  Patient, son and all parties involved with care aware of plan.   01/30/25 0934   Discharge Planning   Home or Post Acute Services Community services   Type of Post Acute Facility Services Skilled nursing  ([facility])   Expected Discharge Disposition SNF   Does the patient need discharge transport arranged? Yes   RoundTrip coordination needed? Yes   Has discharge transport been arranged? No   What day is the transport expected? 01/30/25   What time is the transport expected? 1300

## 2025-01-30 NOTE — NURSING NOTE
Vitals:    01/29/25 2318   BP: 149/80   Pulse: 89   Resp: 18   Temp: 36.3 °C (97.3 °F)   SpO2: 96%     Assisted patient to the bedside commode. Patient is unsteady on feet; 3 person assist. Patient is slightly confused; easy redirected. Patient removed hearing aid. Patient back in bed; bed low and locked position; call light within reach; bed alarm activated.

## 2025-01-30 NOTE — CARE PLAN
The patient's goals for the shift include      The clinical goals for the shift include Pain control    Over the shift, the patient did make progress toward the following goals. Pain had improved upon discharge.    Problem: Safety - Adult  Goal: Free from fall injury  1/30/2025 1429 by Nayla Sharp RN  Outcome: Met  1/30/2025 1210 by Nayla Sharp RN  Outcome: Progressing  1/30/2025 0954 by Nayla Sharp RN  Outcome: Progressing     Problem: Fall/Injury  Goal: Not fall by end of shift  1/30/2025 1429 by Nayla Sharp RN  Outcome: Met  1/30/2025 1210 by Nayla Sharp RN  Outcome: Progressing  1/30/2025 0954 by Nayla Sharp RN  Outcome: Progressing     Problem: Fall/Injury  Goal: Be free from injury by end of the shift  1/30/2025 1429 by Nayla Sharp RN  Outcome: Met  1/30/2025 1210 by Nayla Sharp RN  Outcome: Progressing  1/30/2025 0954 by Nayla Sharp RN  Outcome: Progressing     Problem: Fall/Injury  Goal: Use assistive devices by end of the shift  1/30/2025 1429 by Nayla Sharp RN  Outcome: Met  1/30/2025 1210 by Nayla Sharp RN  Outcome: Progressing  1/30/2025 0954 by Nayla Sharp RN  Outcome: Progressing     Problem: Pain  Goal: Takes deep breaths with improved pain control throughout the shift  1/30/2025 1429 by Nayla Sharp RN  Outcome: Met  1/30/2025 1210 by Nayla Sharp RN  Outcome: Progressing  1/30/2025 0954 by Nayla Sharp RN  Outcome: Progressing     Problem: Pain  Goal: Turns in bed with improved pain control throughout the shift  1/30/2025 1429 by Nayla Sharp RN  Outcome: Met  1/30/2025 1210 by Nayla Sharp RN  Outcome: Progressing  1/30/2025 0954 by Nayla Sharp RN  Outcome: Progressing     Problem: Pain  Goal: Turns in bed with improved pain control throughout the shift  1/30/2025 0954 by Nayla Sharp RN  Outcome: Progressing     Problem: Pain  Goal: Participates in PT with improved pain control throughout the shift  1/30/2025 1429 by Nayla Sharp RN  Outcome: Met  1/30/2025 1210 by  Nayal Sharp RN  Outcome: Progressing  1/30/2025 0954 by Nayla Sharp RN  Outcome: Progressing     Problem: Pain  Goal: Free from opioid side effects throughout the shift  1/30/2025 1429 by Nayla Sharp RN  Outcome: Met  1/30/2025 1210 by Nayla Sharp RN  Outcome: Progressing  1/30/2025 0954 by Nayla Sharp RN  Outcome: Progressing

## 2025-01-30 NOTE — PROGRESS NOTES
Interdisciplinary Rounds were completed at the bedside with Patient.  Staff participating in rounds included: Orthopedic Coordinator Physical Therapist.  Topics discussed included: Today's Plan of Care Discharge Plan and Accommodations Physical Therapy Medications/Preferred Pharmacy and the Patient was given the opportunity to ask additional questions or bring up any concerns at that time.  During the final discharge discussion and review of instructions, they will have another opportunity to review questions or concerns prior to leaving our care.  Patient was given information on who to call post-discharge should new questions or concerns arise.      At the time of this discussion, the patient's plan includes:    Discharge Date/Disposition:  To SNF/Acute Rehab, Today with  Discharge Needs: No Equipment Needs Identified  Medications/Pharmacy: [id number] service utilized for discharge prescriptions through  [facility]

## 2025-01-30 NOTE — NURSING NOTE
Patient discharged to [facility] ([facility]) at 13:58. Discharge papers given and reviewed with patient. All personal belongings packed and taken with patient.  Dressing to left hip clean/dry/intact.   Medications given to ambulance squad drivers.

## 2025-01-31 ENCOUNTER — NURSING HOME VISIT (OUTPATIENT)
Dept: POST ACUTE CARE | Facility: EXTERNAL LOCATION | Age: 85
End: 2025-01-31
Payer: MEDICARE

## 2025-01-31 DIAGNOSIS — M16.12 UNILATERAL PRIMARY OSTEOARTHRITIS, LEFT HIP: ICD-10-CM

## 2025-01-31 DIAGNOSIS — Z96.642 S/P TOTAL LEFT HIP ARTHROPLASTY: Primary | ICD-10-CM

## 2025-01-31 DIAGNOSIS — M06.9 RHEUMATOID ARTHRITIS, INVOLVING UNSPECIFIED SITE, UNSPECIFIED WHETHER RHEUMATOID FACTOR PRESENT (MULTI): ICD-10-CM

## 2025-01-31 DIAGNOSIS — E06.3 HYPOTHYROIDISM DUE TO HASHIMOTO THYROIDITIS: ICD-10-CM

## 2025-01-31 PROCEDURE — 99306 1ST NF CARE HIGH MDM 50: CPT | Performed by: INTERNAL MEDICINE

## 2025-01-31 ASSESSMENT — ENCOUNTER SYMPTOMS
SHORTNESS OF BREATH: 0
FEVER: 0
ABDOMINAL PAIN: 0

## 2025-01-31 NOTE — PROGRESS NOTES
ALONSO Garcia, PAJessicaC, ATC  Orthopedic Physician Assisant  Adult Reconstruction and Total Joint Replacement  General Orthopedics  Department of Orthopaedic Surgery  [place] [address]      GISELLA GalloC

## 2025-01-31 NOTE — LETTER
Patient: Mckenna Ferguson  : 1940    Encounter Date: 2025    HISTORY AND PHYSICAL    History of present illness:    Mckenna Ferguson is a 84 y.o. female admitted to SNF after hospitalization for elective left total hip arthroplasty.  She had a right hip replaced about a year and a half ago and did really well.  This time, she is having quite a bit of pain and difficulty getting around.  She does have stairs at home with bedroom upstairs.  For this reason she decided to come to skilled nursing facility.  She has no particular complaints this morning outside of the pain but says that she slept fairly well.  She has not moved her bowels since the time of surgery.    Past Medical History:   Diagnosis Date   • Arrhythmia     pAF   • Arthritis    • Asymptomatic menopausal state 2020    Asymptomatic menopausal state   • Atrial fibrillation (Multi) 2024   • Cellulitis, unspecified 2016    Cellulitis   • Cervicalgia 2021    Neck pain   • Disorder of white blood cells, unspecified 2016    Neutrophilia   • Dizziness and giddiness 2022    Dizziness   • Dysphagia, unspecified 2022    Dysphagia   • Encounter for general adult medical examination without abnormal findings 2022    Encounter for Medicare annual wellness exam   • Encounter for gynecological examination (general) (routine) without abnormal findings 10/20/2022    Visit for gynecologic examination   • Encounter for immunization 2019    Need for pneumococcal vaccination   • Encounter for other screening for malignant neoplasm of breast 2022    Breast screening   • Encounter for screening for malignant neoplasm of colon 2022    Colon cancer screening   • Essential (primary) hypertension 2022    Hypertension   • Fever, unspecified 2015    Fever   • Foot drop, unspecified foot     Foot-drop left   • Furuncle, unspecified 2020    Boil   • GERD (gastroesophageal reflux disease)    •  Hearing aid worn    • Hypothyroidism    • Hypothyroidism, unspecified 06/13/2022    Hypothyroidism   • Insomnia, unspecified 12/03/2019    Insomnia   • Otalgia, unspecified ear 08/09/2021    Posterior auricular pain   • Other abnormality of red blood cells 06/13/2022    Elevated MCV   • Other fatigue 06/13/2022    Fatigue   • Pain in leg, unspecified 12/03/2019    Chronic leg pain   • Pain in right hand 07/10/2017    Pain in both hands   • Pain in right knee 12/23/2022    Right knee pain   • Pain in right thigh 12/16/2022    Pain of right thigh   • 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/05/2017    Dupuytren contracture   • Personal history of other diseases of the circulatory system 09/14/2015    History of hypertension   • Personal history of other endocrine, nutritional and metabolic disease 09/12/2013    History of hypothyroidism   • Postmenopausal atrophic vaginitis 10/20/2022    Vaginal atrophy   • Presence of intraocular lens 09/28/2015    Pseudophakia of left eye   • Presence of intraocular lens 09/28/2015    Pseudophakia of left eye   • Presence of intraocular lens 09/28/2015    Pseudophakia of left eye   • Rash and other nonspecific skin eruption 08/13/2018    Rash   • Right lower quadrant pain 12/16/2015    Abdominal pain, RLQ (right lower quadrant)   • Right upper quadrant pain 09/10/2018    Abdominal pain, RUQ (right upper quadrant)   • Unspecified blepharitis left eye, unspecified eyelid 09/28/2015    Blepharitis of left eye   • Unspecified blepharitis left eye, unspecified eyelid 09/28/2015    Blepharitis of left eye   • Unspecified blepharitis left eye, unspecified eyelid 10/26/2015    Blepharitis of left eye   • Unspecified fall, initial encounter 12/16/2022    Fall at home   • Unspecified hearing loss, unspecified ear 09/05/2017    Change in hearing   • Vitamin D deficiency, unspecified 06/13/2022    Vitamin D deficiency   • Zoster without complications 06/13/2022    Shingles        Past  Surgical History:   Procedure Laterality Date   • BREAST BIOPSY  05/30/2013    Biopsy Breast Open   • CATARACT EXTRACTION  09/28/2015    Cataract Surgery   • CHOLECYSTECTOMY  05/30/2013    Cholecystectomy   • HYSTERECTOMY  05/30/2013    Hysterectomy   • LUMBAR LAMINECTOMY  05/30/2013    Laminectomy Lumbar   • OTHER SURGICAL HISTORY  05/30/2013    Tarsorrhaphy Lateral Bilaterally   • OTHER SURGICAL HISTORY  11/22/2017    Surgery Excision Lipoma        Family History   Problem Relation Name Age of Onset   • Breast cancer Mother         Social History     Socioeconomic History   • Marital status: Single     Spouse name: Not on file   • Number of children: Not on file   • Years of education: Not on file   • Highest education level: Not on file   Occupational History   • Not on file   Tobacco Use   • Smoking status: Never   • Smokeless tobacco: Never   Vaping Use   • Vaping status: Never Used   Substance and Sexual Activity   • Alcohol use: Not Currently   • Drug use: Not Currently   • Sexual activity: Not on file   Other Topics Concern   • Not on file   Social History Narrative   • Not on file     Social Drivers of Health     Financial Resource Strain: Low Risk  (1/28/2025)    Overall Financial Resource Strain (CARDIA)    • Difficulty of Paying Living Expenses: Not hard at all   Food Insecurity: No Food Insecurity (1/28/2025)    Hunger Vital Sign    • Worried About Running Out of Food in the Last Year: Never true    • Ran Out of Food in the Last Year: Never true   Transportation Needs: No Transportation Needs (1/28/2025)    PRAPARE - Transportation    • Lack of Transportation (Medical): No    • Lack of Transportation (Non-Medical): No   Physical Activity: Inactive (1/28/2025)    Exercise Vital Sign    • Days of Exercise per Week: 0 days    • Minutes of Exercise per Session: 0 min   Stress: No Stress Concern Present (1/28/2025)    Citizen of [place] of Occupational Health - Occupational Stress Questionnaire    • Feeling  of Stress : Not at all   Social Connections: Moderately Integrated (1/28/2025)    Social Connection and Isolation Panel [NHANES]    • Frequency of Communication with Friends and Family: More than three times a week    • Frequency of Social Gatherings with Friends and Family: Three times a week    • Attends Holiness Services: More than 4 times per year    • Active Member of Clubs or Organizations: No    • Attends Club or Organization Meetings: Never    • Marital Status:    Intimate Partner Violence: Not At Risk (1/28/2025)    Humiliation, Afraid, Rape, and Kick questionnaire    • Fear of Current or Ex-Partner: No    • Emotionally Abused: No    • Physically Abused: No    • Sexually Abused: No   Housing Stability: Low Risk  (1/28/2025)    Housing Stability Vital Sign    • Unable to Pay for Housing in the Last Year: No    • Number of Times Moved in the Last Year: 0    • Homeless in the Last Year: No       Review of Systems   Constitutional:  Negative for fever.   Respiratory:  Negative for shortness of breath.    Cardiovascular:  Negative for chest pain.   Gastrointestinal:  Negative for abdominal pain.   All other systems reviewed and are negative.       Objective  Vital signs reviewed in [facility].    Physical Exam  Vitals reviewed.   Constitutional:       Appearance: Normal appearance.   Cardiovascular:      Rate and Rhythm: Normal rate and regular rhythm.      Heart sounds: No murmur heard.  Pulmonary:      Breath sounds: Normal breath sounds. No wheezing, rhonchi or rales.   Musculoskeletal:      Right lower leg: No edema.      Left lower leg: No edema.      The surgical site is covered with a silver impregnated dressing.  There is not significant drainage noted and there is no surrounding cellulitis or other signs of infection around the dressing.    Assessment/Plan  Diagnoses and all orders for this visit:  S/P total left hip arthroplasty (Primary)  Unilateral primary osteoarthritis, left  hip  Hypothyroidism due to Hashimoto thyroiditis  Rheumatoid arthritis, involving unspecified site, unspecified whether rheumatoid factor present (Multi)    Left hip OA s/p MERCEDEZ.  Local care to the wound as directed in the surgeon's orders.  Analgesic orders have been reviewed and approved.  DVT prophylaxis as ordered with xarelto.  Follow up with the surgeon as scheduled.  I have some question about the Xarelto dosing, I suspect that should be 10 mg daily for DVT prophylaxis but she had both a 10 to 20 mg dose listed on the after visit summary upon arrival to skilled nursing facility.  I will reach out to the pharmacist who saw her while inpatient and try to sort this out.    HTN -continue hypertension and atenolol.    Hypothyroidism -levothyroxine.    Gout -not had a flare in years.    Rheumatoid arthritis.  She does follow with her rheumatologist.    The essential elements of the hospital History and Physical as well as the Discharge Summary have been confirmed to the best of my ability by means of interviewing the patient plus a review of the hospital records. Please note that this entry was created in a shared electronic medical record.     All available hospital and outpatient records have been reviewed. Will continue other current drug therapies as ordered on the continuation of care documents. Physical and Occupational Therapy will assess and treat per POC. Analgesia for identified pain symptoms. Continue the various medicines for bowel and bladder symptoms as well as the vitamins and supplements per hospital instructions. Will assess and provide local care to abnormalities of skin integrity. Drug to drug interaction data as noted by the pharmacy has been reviewed. The patient's condition warrants the continuation of these drugs as prescribed by the medical specialists. Discharge planning will be coordinated through the  department. I have reviewed any advanced directive documentation that is  contained in the admission packet as directives indicating whether a surrogate decision maker has been identified.       Electronically Signed By: Lorenzo Krueger MD   1/31/25 10:11 AM

## 2025-01-31 NOTE — PROGRESS NOTES
HISTORY AND PHYSICAL    History of present illness:    Mckenna Ferguson is a 84 y.o. female admitted to SNF after hospitalization for elective left total hip arthroplasty.  She had a right hip replaced about a year and a half ago and did really well.  This time, she is having quite a bit of pain and difficulty getting around.  She does have stairs at home with bedroom upstairs.  For this reason she decided to come to skilled nursing facility.  She has no particular complaints this morning outside of the pain but says that she slept fairly well.  She has not moved her bowels since the time of surgery.    Past Medical History:   Diagnosis Date    Arrhythmia     pAF    Arthritis     Asymptomatic menopausal state 12/03/2020    Asymptomatic menopausal state    Atrial fibrillation (Multi) 06/23/2024    Cellulitis, unspecified 11/30/2016    Cellulitis    Cervicalgia 08/09/2021    Neck pain    Disorder of white blood cells, unspecified 07/25/2016    Neutrophilia    Dizziness and giddiness 03/11/2022    Dizziness    Dysphagia, unspecified 04/13/2022    Dysphagia    Encounter for general adult medical examination without abnormal findings 03/11/2022    Encounter for Medicare annual wellness exam    Encounter for gynecological examination (general) (routine) without abnormal findings 10/20/2022    Visit for gynecologic examination    Encounter for immunization 12/03/2019    Need for pneumococcal vaccination    Encounter for other screening for malignant neoplasm of breast 03/11/2022    Breast screening    Encounter for screening for malignant neoplasm of colon 09/27/2022    Colon cancer screening    Essential (primary) hypertension 03/11/2022    Hypertension    Fever, unspecified 12/16/2015    Fever    Foot drop, unspecified foot     Foot-drop left    Furuncle, unspecified 01/24/2020    Boil    GERD (gastroesophageal reflux disease)     Hearing aid worn     Hypothyroidism     Hypothyroidism, unspecified 06/13/2022     Hypothyroidism    Insomnia, unspecified 12/03/2019    Insomnia    Otalgia, unspecified ear 08/09/2021    Posterior auricular pain    Other abnormality of red blood cells 06/13/2022    Elevated MCV    Other fatigue 06/13/2022    Fatigue    Pain in leg, unspecified 12/03/2019    Chronic leg pain    Pain in right hand 07/10/2017    Pain in both hands    Pain in right knee 12/23/2022    Right knee pain    Pain in right thigh 12/16/2022    Pain of right thigh    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/05/2017    Dupuytren contracture    Personal history of other diseases of the circulatory system 09/14/2015    History of hypertension    Personal history of other endocrine, nutritional and metabolic disease 09/12/2013    History of hypothyroidism    Postmenopausal atrophic vaginitis 10/20/2022    Vaginal atrophy    Presence of intraocular lens 09/28/2015    Pseudophakia of left eye    Presence of intraocular lens 09/28/2015    Pseudophakia of left eye    Presence of intraocular lens 09/28/2015    Pseudophakia of left eye    Rash and other nonspecific skin eruption 08/13/2018    Rash    Right lower quadrant pain 12/16/2015    Abdominal pain, RLQ (right lower quadrant)    Right upper quadrant pain 09/10/2018    Abdominal pain, RUQ (right upper quadrant)    Unspecified blepharitis left eye, unspecified eyelid 09/28/2015    Blepharitis of left eye    Unspecified blepharitis left eye, unspecified eyelid 09/28/2015    Blepharitis of left eye    Unspecified blepharitis left eye, unspecified eyelid 10/26/2015    Blepharitis of left eye    Unspecified fall, initial encounter 12/16/2022    Fall at home    Unspecified hearing loss, unspecified ear 09/05/2017    Change in hearing    Vitamin D deficiency, unspecified 06/13/2022    Vitamin D deficiency    Zoster without complications 06/13/2022    Shingles        Past Surgical History:   Procedure Laterality Date    BREAST BIOPSY  05/30/2013    Biopsy Breast Open    CATARACT  EXTRACTION  09/28/2015    Cataract Surgery    CHOLECYSTECTOMY  05/30/2013    Cholecystectomy    HYSTERECTOMY  05/30/2013    Hysterectomy    LUMBAR LAMINECTOMY  05/30/2013    Laminectomy Lumbar    OTHER SURGICAL HISTORY  05/30/2013    Tarsorrhaphy Lateral Bilaterally    OTHER SURGICAL HISTORY  11/22/2017    Surgery Excision Lipoma        Family History   Problem Relation Name Age of Onset    Breast cancer Mother         Social History     Socioeconomic History    Marital status: Single     Spouse name: Not on file    Number of children: Not on file    Years of education: Not on file    Highest education level: Not on file   Occupational History    Not on file   Tobacco Use    Smoking status: Never    Smokeless tobacco: Never   Vaping Use    Vaping status: Never Used   Substance and Sexual Activity    Alcohol use: Not Currently    Drug use: Not Currently    Sexual activity: Not on file   Other Topics Concern    Not on file   Social History Narrative    Not on file     Social Drivers of Health     Financial Resource Strain: Low Risk  (1/28/2025)    Overall Financial Resource Strain (CARDIA)     Difficulty of Paying Living Expenses: Not hard at all   Food Insecurity: No Food Insecurity (1/28/2025)    Hunger Vital Sign     Worried About Running Out of Food in the Last Year: Never true     Ran Out of Food in the Last Year: Never true   Transportation Needs: No Transportation Needs (1/28/2025)    PRAPARE - Transportation     Lack of Transportation (Medical): No     Lack of Transportation (Non-Medical): No   Physical Activity: Inactive (1/28/2025)    Exercise Vital Sign     Days of Exercise per Week: 0 days     Minutes of Exercise per Session: 0 min   Stress: No Stress Concern Present (1/28/2025)    [employer] - Occupational Stress Questionnaire     Feeling of Stress : Not at all   Social Connections: Moderately Integrated (1/28/2025)    Social Connection and Isolation Panel [NHANES]      Frequency of Communication with Friends and Family: More than three times a week     Frequency of Social Gatherings with Friends and Family: Three times a week     Attends Yazidi Services: More than 4 times per year     Active Member of Clubs or Organizations: No     Attends Club or Organization Meetings: Never     Marital Status:    Intimate Partner Violence: Not At Risk (1/28/2025)    Humiliation, Afraid, Rape, and Kick questionnaire     Fear of Current or Ex-Partner: No     Emotionally Abused: No     Physically Abused: No     Sexually Abused: No   Housing Stability: Low Risk  (1/28/2025)    Housing Stability Vital Sign     Unable to Pay for Housing in the Last Year: No     Number of Times Moved in the Last Year: 0     Homeless in the Last Year: No       Review of Systems   Constitutional:  Negative for fever.   Respiratory:  Negative for shortness of breath.    Cardiovascular:  Negative for chest pain.   Gastrointestinal:  Negative for abdominal pain.   All other systems reviewed and are negative.       Objective   Vital signs reviewed in [facility].    Physical Exam  Vitals reviewed.   Constitutional:       Appearance: Normal appearance.   Cardiovascular:      Rate and Rhythm: Normal rate and regular rhythm.      Heart sounds: No murmur heard.  Pulmonary:      Breath sounds: Normal breath sounds. No wheezing, rhonchi or rales.   Musculoskeletal:      Right lower leg: No edema.      Left lower leg: No edema.      The surgical site is covered with a silver impregnated dressing.  There is not significant drainage noted and there is no surrounding cellulitis or other signs of infection around the dressing.    Assessment/Plan   Diagnoses and all orders for this visit:  S/P total left hip arthroplasty (Primary)  Unilateral primary osteoarthritis, left hip  Hypothyroidism due to Hashimoto thyroiditis  Rheumatoid arthritis, involving unspecified site, unspecified whether rheumatoid factor present  (Multi)    Left hip OA s/p [id number].  Local care to the wound as directed in the surgeon's orders.  Analgesic orders have been reviewed and approved.  DVT prophylaxis as ordered with xarelto.  Follow up with the surgeon as scheduled.  I have some question about the Xarelto dosing, I suspect that should be 10 mg daily for DVT prophylaxis but she had both a 10 to 20 mg dose listed on the after visit summary upon arrival to skilled nursing facility.  I will reach out to the pharmacist who saw her while inpatient and try to sort this out.    HTN -continue hypertension and atenolol.    Hypothyroidism -levothyroxine.    Gout -not had a flare in years.    Rheumatoid arthritis.  She does follow with her rheumatologist.    The essential elements of the hospital History and Physical as well as the Discharge Summary have been confirmed to the best of my ability by means of interviewing the patient plus a review of the hospital records. Please note that this entry was created in a shared electronic medical record.     All available hospital and outpatient records have been reviewed. Will continue other current drug therapies as ordered on the continuation of care documents. Physical and Occupational Therapy will assess and treat per POC. Analgesia for identified pain symptoms. Continue the various medicines for bowel and bladder symptoms as well as the vitamins and supplements per hospital instructions. Will assess and provide local care to abnormalities of skin integrity. Drug to drug interaction data as noted by the pharmacy has been reviewed. The patient's condition warrants the continuation of these drugs as prescribed by the medical specialists. Discharge planning will be coordinated through the  department. I have reviewed any advanced directive documentation that is contained in the admission packet as directives indicating whether a surrogate decision maker has been identified.

## 2025-02-03 ENCOUNTER — NURSING HOME VISIT (OUTPATIENT)
Dept: PHYSICAL MEDICINE AND REHAB | Facility: HOSPITAL | Age: 85
End: 2025-02-03
Payer: MEDICARE

## 2025-02-03 VITALS
SYSTOLIC BLOOD PRESSURE: 128 MMHG | HEART RATE: 65 BPM | TEMPERATURE: 97.7 F | DIASTOLIC BLOOD PRESSURE: 69 MMHG | WEIGHT: 174.4 LBS | BODY MASS INDEX: 28.15 KG/M2 | RESPIRATION RATE: 16 BRPM

## 2025-02-03 DIAGNOSIS — E03.9 HYPOTHYROIDISM, UNSPECIFIED TYPE: ICD-10-CM

## 2025-02-03 DIAGNOSIS — M16.12 PRIMARY OSTEOARTHRITIS OF LEFT HIP: Primary | ICD-10-CM

## 2025-02-03 DIAGNOSIS — I10 HYPERTENSION, UNSPECIFIED TYPE: ICD-10-CM

## 2025-02-03 DIAGNOSIS — Z96.642 S/P TOTAL LEFT HIP ARTHROPLASTY: ICD-10-CM

## 2025-02-03 DIAGNOSIS — I48.0 PAROXYSMAL ATRIAL FIBRILLATION (MULTI): ICD-10-CM

## 2025-02-03 PROCEDURE — 99309 SBSQ NF CARE MODERATE MDM 30: CPT | Performed by: PHYSICIAN ASSISTANT

## 2025-02-03 RX ORDER — OXYCODONE HYDROCHLORIDE 5 MG/1
5 TABLET ORAL EVERY 6 HOURS PRN
Qty: 28 TABLET | Refills: 0 | Status: SHIPPED | OUTPATIENT
Start: 2025-02-03 | End: 2025-02-10

## 2025-02-03 ASSESSMENT — ENCOUNTER SYMPTOMS
FEVER: 0
FREQUENCY: 0
COUGH: 0
DIARRHEA: 0
NAUSEA: 0
SHORTNESS OF BREATH: 0
APPETITE CHANGE: 0
HEADACHES: 0
VOMITING: 0
TREMORS: 0
WHEEZING: 0
CHILLS: 0
WEAKNESS: 0
DYSURIA: 0
CONFUSION: 0
NERVOUS/ANXIOUS: 0
CONSTIPATION: 0
ABDOMINAL PAIN: 0
DIZZINESS: 0
HEMATURIA: 0

## 2025-02-03 NOTE — PROGRESS NOTES
Subjective   [id number] : Mckenna Ferguson is a 84 y.o. female admitted to [facility] for rehab. No chief complaint on file..  HPI  Pt with a h/o HTN hypothyroidism, hyperlipidemia, and gout underwent an elective left total hip arthroplasty on 1/28 with Dr Cohen.  Pt reports that she is doing well and making good progress with therapy.  She report adequate pain control with tramadol.  She offers no new complaints or concerns.  She denies any shortness of breath or cough. She is eating and drinking well. No n/v/d/c.  No lower leg edema.   She lives with her son.    Review of Systems   Constitutional:  Negative for appetite change, chills and fever.   Respiratory:  Negative for cough, shortness of breath and wheezing.    Cardiovascular:  Negative for chest pain and leg swelling.   Gastrointestinal:  Negative for abdominal pain, constipation, diarrhea, nausea and vomiting.   Genitourinary:  Negative for dysuria, frequency and hematuria.   Neurological:  Negative for dizziness, tremors, weakness and headaches.   Psychiatric/Behavioral:  Negative for confusion. The patient is not nervous/anxious.    All other systems reviewed and are negative.      Objective   /69   Pulse 65   Temp 36.5 °C (97.7 °F)   Resp 16   Wt 79.1 kg (174 lb 6.4 oz)   BMI 28.15 kg/m²    Physical Exam  Constitutional:       General: She is not in acute distress.  Eyes:      Conjunctiva/sclera: Conjunctivae normal.      Pupils: Pupils are equal, round, and reactive to light.   Cardiovascular:      Rate and Rhythm: Normal rate and regular rhythm.      Heart sounds: No murmur heard.  Pulmonary:      Effort: Pulmonary effort is normal.      Breath sounds: No wheezing, rhonchi or rales.   Abdominal:      General: Abdomen is flat. Bowel sounds are normal. There is no distension.      Palpations: Abdomen is soft. There is no mass.      Tenderness: There is no abdominal tenderness.   Musculoskeletal:         General: No swelling.       "Comments: Left hip incision is well approximated with silver dressing in place.  No redness or swelling noted.    Skin:     General: Skin is warm and dry.      Findings: No rash.   Neurological:      Mental Status: She is alert and oriented to person, place, and time.     No results found for this or any previous visit (from the past 24 hours).   No lab exists for component: \"CBC BMP\"  Assessment/Plan   Problem List Items Addressed This Visit             ICD-10-CM    Hypertension I10     Continue norvasc.  Monitor blood pressures and adjust meds as needed.          Paroxysmal atrial fibrillation (Multi) I48.0     Rate controlled with atenolol and anticoagulated with xarelto.         Hypothyroidism, unspecified E03.9     synthroid         Primary osteoarthritis of left hip - Primary M16.12    S/P total left hip arthroplasty Z96.642     Continue PT/OT.  Pain management with tramadol.  Anticoagulated with xarelto x 30 days.  Remove meplex on day 7.  WBAT.  Follow up with surgeon as requested.                 "

## 2025-02-04 NOTE — ASSESSMENT & PLAN NOTE
Continue PT/OT.  Pain management with tramadol.  Anticoagulated with xarelto x 30 days.  Remove meplex on day 7.  WBAT.  Follow up with surgeon as requested.

## 2025-02-07 ENCOUNTER — NURSING HOME VISIT (OUTPATIENT)
Dept: POST ACUTE CARE | Facility: EXTERNAL LOCATION | Age: 85
End: 2025-02-07
Payer: MEDICARE

## 2025-02-07 VITALS
SYSTOLIC BLOOD PRESSURE: 110 MMHG | TEMPERATURE: 97.8 F | RESPIRATION RATE: 16 BRPM | HEART RATE: 73 BPM | OXYGEN SATURATION: 96 % | WEIGHT: 173.4 LBS | DIASTOLIC BLOOD PRESSURE: 59 MMHG | BODY MASS INDEX: 27.99 KG/M2

## 2025-02-07 DIAGNOSIS — M06.9 RHEUMATOID ARTHRITIS, INVOLVING UNSPECIFIED SITE, UNSPECIFIED WHETHER RHEUMATOID FACTOR PRESENT (MULTI): ICD-10-CM

## 2025-02-07 DIAGNOSIS — Z96.642 S/P TOTAL LEFT HIP ARTHROPLASTY: Primary | ICD-10-CM

## 2025-02-07 DIAGNOSIS — M16.12 UNILATERAL PRIMARY OSTEOARTHRITIS, LEFT HIP: ICD-10-CM

## 2025-02-07 DIAGNOSIS — E06.3 HYPOTHYROIDISM DUE TO HASHIMOTO THYROIDITIS: ICD-10-CM

## 2025-02-07 PROCEDURE — 99309 SBSQ NF CARE MODERATE MDM 30: CPT | Performed by: PHYSICIAN ASSISTANT

## 2025-02-07 ASSESSMENT — ENCOUNTER SYMPTOMS
WEAKNESS: 0
APPETITE CHANGE: 0
DIZZINESS: 0
CHILLS: 0
FREQUENCY: 0
WHEEZING: 0
NAUSEA: 0
HEADACHES: 0
HEMATURIA: 0
CONFUSION: 0
SHORTNESS OF BREATH: 0
DIARRHEA: 0
VOMITING: 0
DYSURIA: 0
FEVER: 0
CONSTIPATION: 0
NERVOUS/ANXIOUS: 0
ABDOMINAL PAIN: 0
COUGH: 0
TREMORS: 0

## 2025-02-07 NOTE — PROGRESS NOTES
Subjective   [id number] : Mckenna Ferguson is a 84 y.o. female admitted to [facility] for rehab. No chief complaint on file..  HPI  Pt with a h/o HTN hypothyroidism, hyperlipidemia, and gout underwent an elective left total hip arthroplasty on 1/28 with Dr Cohen.   Pt seen while resting in bed.  She is alert and pleasant.  She feels like she is ready to go home.  She is ambulating with walker.  She report adequate pain control with tramadol.  She offers no new complaints or concerns.  She denies any shortness of breath or cough. She is eating and drinking well. No n/v/d/c.  No lower leg edema.   She lives with her son.    Review of Systems   Constitutional:  Negative for appetite change, chills and fever.   Respiratory:  Negative for cough, shortness of breath and wheezing.    Cardiovascular:  Negative for chest pain and leg swelling.   Gastrointestinal:  Negative for abdominal pain, constipation, diarrhea, nausea and vomiting.   Genitourinary:  Negative for dysuria, frequency and hematuria.   Neurological:  Negative for dizziness, tremors, weakness and headaches.   Psychiatric/Behavioral:  Negative for confusion. The patient is not nervous/anxious.    All other systems reviewed and are negative.      Objective   /59   Pulse 73   Temp 36.6 °C (97.8 °F)   Resp 16   Wt 78.7 kg (173 lb 6.4 oz)   SpO2 96%   BMI 27.99 kg/m²    Physical Exam  Constitutional:       General: She is not in acute distress.  Eyes:      Conjunctiva/sclera: Conjunctivae normal.      Pupils: Pupils are equal, round, and reactive to light.   Cardiovascular:      Rate and Rhythm: Normal rate and regular rhythm.      Heart sounds: No murmur heard.  Pulmonary:      Effort: Pulmonary effort is normal.      Breath sounds: No wheezing, rhonchi or rales.   Abdominal:      General: Abdomen is flat. Bowel sounds are normal. There is no distension.      Palpations: Abdomen is soft. There is no mass.      Tenderness: There is no abdominal  "tenderness.   Musculoskeletal:         General: No swelling.      Comments: Left hip incision is well approximated with silver dressing in place.  No redness or swelling noted.    Skin:     General: Skin is warm and dry.      Findings: No rash.   Neurological:      Mental Status: She is alert and oriented to person, place, and time.     No results found for this or any previous visit (from the past 24 hours).   No lab exists for component: \"CBC BMP\"  Assessment/Plan   Hypertension I10        Continue norvasc.  Monitor blood pressures and adjust meds as needed.            Paroxysmal atrial fibrillation (Multi) I48.0       Rate controlled with atenolol and anticoagulated with xarelto.           Hypothyroidism, unspecified E03.9       synthroid           Primary osteoarthritis of left hip - Primary M16.12     S/P total left hip arthroplasty Z96.642       Continue PT/OT.  Pain management with tramadol.  Anticoagulated with xarelto x 30 days. Follow up with surgeon as requested.   Discharge planning for Monday 2/10  Home health for RN/PT/OT recommended.                 Time spent: 30 min in review of chart, labs and orders, consultation with pt and documentation.       " mammogram

## 2025-02-07 NOTE — LETTER
Patient: Mckenna Ferguson  : 1940    Encounter Date: 2025      Subjective  [id number] : Mckenna Ferguson is a 84 y.o. female admitted to [facility] for rehab. No chief complaint on file..  HPI  Pt with a h/o HTN hypothyroidism, hyperlipidemia, and gout underwent an elective left total hip arthroplasty on  with Dr Cohen.   Pt seen while resting in bed.  She is alert and pleasant.  She feels like she is ready to go home.  She is ambulating with walker.  She report adequate pain control with tramadol.  She offers no new complaints or concerns.  She denies any shortness of breath or cough. She is eating and drinking well. No n/v/d/c.  No lower leg edema.   She lives with her son.    Review of Systems   Constitutional:  Negative for appetite change, chills and fever.   Respiratory:  Negative for cough, shortness of breath and wheezing.    Cardiovascular:  Negative for chest pain and leg swelling.   Gastrointestinal:  Negative for abdominal pain, constipation, diarrhea, nausea and vomiting.   Genitourinary:  Negative for dysuria, frequency and hematuria.   Neurological:  Negative for dizziness, tremors, weakness and headaches.   Psychiatric/Behavioral:  Negative for confusion. The patient is not nervous/anxious.    All other systems reviewed and are negative.      Objective  /59   Pulse 73   Temp 36.6 °C (97.8 °F)   Resp 16   Wt 78.7 kg (173 lb 6.4 oz)   SpO2 96%   BMI 27.99 kg/m²    Physical Exam  Constitutional:       General: She is not in acute distress.  Eyes:      Conjunctiva/sclera: Conjunctivae normal.      Pupils: Pupils are equal, round, and reactive to light.   Cardiovascular:      Rate and Rhythm: Normal rate and regular rhythm.      Heart sounds: No murmur heard.  Pulmonary:      Effort: Pulmonary effort is normal.      Breath sounds: No wheezing, rhonchi or rales.   Abdominal:      General: Abdomen is flat. Bowel sounds are normal. There is no distension.      Palpations:  "Abdomen is soft. There is no mass.      Tenderness: There is no abdominal tenderness.   Musculoskeletal:         General: No swelling.      Comments: Left hip incision is well approximated with silver dressing in place.  No redness or swelling noted.    Skin:     General: Skin is warm and dry.      Findings: No rash.   Neurological:      Mental Status: She is alert and oriented to person, place, and time.     No results found for this or any previous visit (from the past 24 hours).   No lab exists for component: \"CBC BMP\"  Assessment/Plan  Hypertension I10        Continue norvasc.  Monitor blood pressures and adjust meds as needed.            Paroxysmal atrial fibrillation (Multi) I48.0       Rate controlled with atenolol and anticoagulated with xarelto.           Hypothyroidism, unspecified E03.9       synthroid           Primary osteoarthritis of left hip - Primary M16.12     S/P total left hip arthroplasty Z96.642       Continue PT/OT.  Pain management with tramadol.  Anticoagulated with xarelto x 30 days. Follow up with surgeon as requested.   Discharge planning for Monday 2/10  Home health for RN/PT/OT recommended.                 Time spent: 30 min in review of chart, labs and orders, consultation with pt and documentation.           Electronically Signed By: Krystal Streeter PA-C   2/7/25  7:38 PM  "

## 2025-02-09 ENCOUNTER — HOME HEALTH ADMISSION (OUTPATIENT)
Dept: HOME HEALTH SERVICES | Facility: HOME HEALTH | Age: 85
End: 2025-02-09
Payer: MEDICARE

## 2025-02-12 ENCOUNTER — HOME CARE VISIT (OUTPATIENT)
Dept: HOME HEALTH SERVICES | Facility: HOME HEALTH | Age: 85
End: 2025-02-12

## 2025-02-27 ENCOUNTER — TELEPHONE (OUTPATIENT)
Dept: CARDIOLOGY | Facility: HOSPITAL | Age: 85
End: 2025-02-27

## 2025-02-27 DIAGNOSIS — Z96.642 S/P TOTAL LEFT HIP ARTHROPLASTY: ICD-10-CM

## 2025-02-27 DIAGNOSIS — I48.0 PAROXYSMAL ATRIAL FIBRILLATION (MULTI): Primary | ICD-10-CM

## 2025-02-27 DIAGNOSIS — M16.12 UNILATERAL PRIMARY OSTEOARTHRITIS, LEFT HIP: ICD-10-CM

## 2025-03-13 ENCOUNTER — HOSPITAL ENCOUNTER (OUTPATIENT)
Dept: RADIOLOGY | Facility: CLINIC | Age: 85
Discharge: HOME | End: 2025-03-13
Payer: MEDICARE

## 2025-03-13 ENCOUNTER — OFFICE VISIT (OUTPATIENT)
Dept: ORTHOPEDIC SURGERY | Facility: CLINIC | Age: 85
End: 2025-03-13
Payer: MEDICARE

## 2025-03-13 DIAGNOSIS — Z47.1 AFTERCARE FOLLOWING LEFT HIP JOINT REPLACEMENT SURGERY: ICD-10-CM

## 2025-03-13 DIAGNOSIS — Z96.642 AFTERCARE FOLLOWING LEFT HIP JOINT REPLACEMENT SURGERY: ICD-10-CM

## 2025-03-13 PROCEDURE — 99211 OFF/OP EST MAY X REQ PHY/QHP: CPT | Performed by: PHYSICIAN ASSISTANT

## 2025-03-13 PROCEDURE — 73502 X-RAY EXAM HIP UNI 2-3 VIEWS: CPT | Mod: LT

## 2025-03-13 PROCEDURE — 1159F MED LIST DOCD IN RCRD: CPT | Performed by: PHYSICIAN ASSISTANT

## 2025-03-13 NOTE — PROGRESS NOTES
ALONSO Garcia, PAJessicaC, ATC  Adult Reconstruction and Joint Replacement Surgery  Phone: [phone]     Fax:[phone]            Chief Complaint   Patient presents with    Left Hip - Post-op     6wk POV Left MERCEDEZ SR25       HPI:    Mckenna Ferguson is a pleasant 85 y.o. year-old female here for follow-up of their side: left total hip arthroplasty by Dr. Cohen.  The patient is approximately 6 week(s) postop.The patient has no mechanical symptoms.  The patient has no swelling and pain.   The patients wound has healed uneventfully.  The patient has been doing HEP and/or outpatient PT.  No complications postoperatively.  She ended up getting the flu at her rehab facility.  She states that she still feeling weak and tired.  Home care never called her for a visit.  She has been doing her home exercises that our office did send her preoperatively.    Review of Systems  Past Medical History:   Diagnosis Date    Arrhythmia     pAF    Arthritis     Asymptomatic menopausal state 2020    Asymptomatic menopausal state    Atrial fibrillation (Multi) 2024    Cellulitis, unspecified 2016    Cellulitis    Cervicalgia 2021    Neck pain    Disorder of white blood cells, unspecified 2016    Neutrophilia    Dizziness and giddiness 2022    Dizziness    Dysphagia, unspecified 2022    Dysphagia    Encounter for general adult medical examination without abnormal findings 2022    Encounter for Medicare annual wellness exam    Encounter for gynecological examination (general) (routine) without abnormal findings 10/20/2022    Visit for gynecologic examination    Encounter for immunization 2019    Need for pneumococcal vaccination    Encounter for other screening for malignant neoplasm of breast 2022    Breast screening    Encounter for screening for malignant neoplasm of colon 2022    Colon cancer screening    Essential (primary) hypertension  03/11/2022    Hypertension    Fever, unspecified 12/16/2015    Fever    Foot drop, unspecified foot     Foot-drop left    Furuncle, unspecified 01/24/2020    Boil    GERD (gastroesophageal reflux disease)     Hearing aid worn     Hypothyroidism     Hypothyroidism, unspecified 06/13/2022    Hypothyroidism    Insomnia, unspecified 12/03/2019    Insomnia    Otalgia, unspecified ear 08/09/2021    Posterior auricular pain    Other abnormality of red blood cells 06/13/2022    Elevated MCV    Other fatigue 06/13/2022    Fatigue    Pain in leg, unspecified 12/03/2019    Chronic leg pain    Pain in right hand 07/10/2017    Pain in both hands    Pain in right knee 12/23/2022    Right knee pain    Pain in right thigh 12/16/2022    Pain of right thigh    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/05/2017    Dupuytren contracture    Personal history of other diseases of the circulatory system 09/14/2015    History of hypertension    Personal history of other endocrine, nutritional and metabolic disease 09/12/2013    History of hypothyroidism    Postmenopausal atrophic vaginitis 10/20/2022    Vaginal atrophy    Presence of intraocular lens 09/28/2015    Pseudophakia of left eye    Presence of intraocular lens 09/28/2015    Pseudophakia of left eye    Presence of intraocular lens 09/28/2015    Pseudophakia of left eye    Rash and other nonspecific skin eruption 08/13/2018    Rash    Right lower quadrant pain 12/16/2015    Abdominal pain, RLQ (right lower quadrant)    Right upper quadrant pain 09/10/2018    Abdominal pain, RUQ (right upper quadrant)    Unspecified blepharitis left eye, unspecified eyelid 09/28/2015    Blepharitis of left eye    Unspecified blepharitis left eye, unspecified eyelid 09/28/2015    Blepharitis of left eye    Unspecified blepharitis left eye, unspecified eyelid 10/26/2015    Blepharitis of left eye    Unspecified fall, initial encounter 12/16/2022    Fall at home    Unspecified hearing loss, unspecified  ear 09/05/2017    Change in hearing    Vitamin D deficiency, unspecified 06/13/2022    Vitamin D deficiency    Zoster without complications 06/13/2022    Shingles     Patient Active Problem List   Diagnosis    Right hip pain    Angioma    Abnormal blood chemistry    Arthritis of right knee    Autonomic dysfunction    Basal cell carcinoma of skin of left upper limb, including shoulder    Basal cell carcinoma of skin of other part of trunk    Blepharitis    Carcinoma in situ of skin of scalp and neck    Shingles    Choroidal nevus of right eye    Chronic leg pain    Change in hearing    Complication of surgical procedure    Conjunctivochalasis of left eye    Conjunctivochalasis of right eye    Dizziness    Dry eyes, bilateral    Dupuytren contracture    Dysphagia    Elevated MCV    Actinic keratosis    Fall at home    Fatigue    Foot-drop    Gout    Hematuria    Hypertension    Hypothyroidism    Incontinence of feces    Insomnia    Lumbar radiculopathy    Lumbar stenosis    Melanocytic nevi of trunk    Melanocytic nevi of unspecified lower limb, including hip    Melanocytic nevi of unspecified upper limb, including shoulder    Polymyalgia rheumatica (Multi)    Hemangioma of skin and subcutaneous tissue    Neutrophilia    Osteoarthritis of right hip    Other melanin hyperpigmentation    PCO (posterior capsular opacification), bilateral    Perineal mass, female    Pseudophakia, both eyes    Pure hypercholesterolemia    Renal insufficiency    Rheumatoid arthritis    Sensorineural hearing loss, asymmetrical    Sleep disturbances    Squamous cell carcinoma of skin of other parts of face    Subjective tinnitus, bilateral    Vaginal atrophy    Vitamin D deficiency    Seborrheic keratosis    Female bladder prolapse    Benign neoplasm of lower extremity    Lentigines    Benign neoplasm of upper extremity    Benign neoplasm of trunk    Squamous cell carcinoma of scalp and skin of neck    Atopic dermatitis    Paroxysmal atrial  fibrillation (Multi)    Gout, unspecified    Hypothyroidism, unspecified    Palmar fascial fibromatosis (dupuytren)    Spinal stenosis, lumbar region with neurogenic claudication    History of falling    Hypertensive chronic kidney disease w stg 1-4/unsp chr kdny    Presence of right artificial hip joint    Primary osteoarthritis of left hip    Unilateral primary osteoarthritis, left hip    S/P total left hip arthroplasty     Medication Documentation Review Audit       Reviewed by Deena Rizvi LPN (Licensed Nurse) on 03/13/25 at 1104      Medication Order Taking? Sig Documenting Provider Last Dose Status   allopurinol (Zyloprim) 100 mg tablet [id number] Yes Take 1 tablet (100 mg) by mouth once daily. Ryley Shane MD 1/28/2025 Morning Active   amLODIPine (Norvasc) 10 mg tablet [id number] Yes TAKE 1 TABLET DAILY John Park MD 1/28/2025 Morning Active   atenolol (Tenormin) 25 mg tablet [id number] Yes TAKE 1 TABLET DAILY John Park MD 1/27/2025 Bedtime Active   atorvastatin (Lipitor) 10 mg tablet [id number] Yes TAKE 1 TABLET BY MOUTH EVERY DAY AS DIRECTED John Park MD 1/28/2025 Morning Active   ergocalciferol (Vitamin D-2) 1.25 MG (06436 UT) capsule [id number] Yes Take 1 capsule (50,000 Units) by mouth every 30 (thirty) days. Ryley Shane MD Past Month Active   Discontinued 03/13/25 1104   levothyroxine (Synthroid, Levoxyl) 50 mcg tablet [id number] Yes Take 1 tablet (50 mcg) by mouth once daily (M-F). Ryley Shane MD 1/28/2025 Morning Active   levothyroxine (Synthroid, Levoxyl) 75 mcg tablet [id number] Yes Take 1 tablet (75 mcg) by mouth. saturday and sunday Ryley Shane MD Past Week Active   multivitamin tablet [id number] Yes Take 1 tablet by mouth once daily. Ryley Shane MD 1/23/2025 Morning Active     Discontinued 03/13/25 1104   polyethylene glycol (Glycolax, Miralax) 17 gram/dose powder [id number] Yes Mix 1 cap (17g) of powder into 8 ounces of fluid and drink once  daily. Lizeth Travis MD  Active   rivaroxaban (Xarelto) 20 mg tablet [id number] Yes Take 1 tablet (20 mg) by mouth once daily. John Park MD  Active   spironolactone (Aldactone) 25 mg tablet [id number] Yes Take 1 tablet (25 mg) by mouth once daily. John Park MD 1/28/2025 Morning Active                  Allergies   Allergen Reactions    Morphine Nausea/vomiting and Nausea Only    Gluten Protein Diarrhea    Wheat Unknown    Wheat Bran Unknown    Egg GI Upset    Egg Derived Other    Gluten Diarrhea    Milk Containing Products (Dairy) GI Upset and Other    Peanut Other and GI Upset     Stomach problems     Social History     Socioeconomic History    Marital status: Single     Spouse name: Not on file    Number of children: Not on file    Years of education: Not on file    Highest education level: Not on file   Occupational History    Not on file   Tobacco Use    Smoking status: Never    Smokeless tobacco: Never   Vaping Use    Vaping status: Never Used   Substance and Sexual Activity    Alcohol use: Not Currently    Drug use: Not Currently    Sexual activity: Not on file   Other Topics Concern    Not on file   Social History Narrative    Not on file     Social Drivers of Health     Financial Resource Strain: Low Risk  (1/28/2025)    Overall Financial Resource Strain (CARDIA)     Difficulty of Paying Living Expenses: Not hard at all   Food Insecurity: No Food Insecurity (1/28/2025)    Hunger Vital Sign     Worried About Running Out of Food in the Last Year: Never true     Ran Out of Food in the Last Year: Never true   Transportation Needs: No Transportation Needs (1/28/2025)    PRAPARE - Transportation     Lack of Transportation (Medical): No     Lack of Transportation (Non-Medical): No   Physical Activity: Inactive (1/28/2025)    Exercise Vital Sign     Days of Exercise per Week: 0 days     Minutes of Exercise per Session: 0 min   Stress: No Stress Concern Present (1/28/2025)    [employer] [place] [employer]  Occupational Health - Occupational Stress Questionnaire     Feeling of Stress : Not at all   Social Connections: Moderately Integrated (1/28/2025)    Social Connection and Isolation Panel [NHANES]     Frequency of Communication with Friends and Family: More than three times a week     Frequency of Social Gatherings with Friends and Family: Three times a week     Attends Jewish Services: More than 4 times per year     Active Member of Clubs or Organizations: No     Attends Club or Organization Meetings: Never     Marital Status:    Intimate Partner Violence: Not At Risk (1/28/2025)    Humiliation, Afraid, Rape, and Kick questionnaire     Fear of Current or Ex-Partner: No     Emotionally Abused: No     Physically Abused: No     Sexually Abused: No   Housing Stability: Low Risk  (1/28/2025)    Housing Stability Vital Sign     Unable to Pay for Housing in the Last Year: No     Number of Times Moved in the Last Year: 0     Homeless in the Last Year: No     Past Surgical History:   Procedure Laterality Date    BREAST BIOPSY  05/30/2013    Biopsy Breast Open    CATARACT EXTRACTION  09/28/2015    Cataract Surgery    CHOLECYSTECTOMY  05/30/2013    Cholecystectomy    HYSTERECTOMY  05/30/2013    Hysterectomy    LUMBAR LAMINECTOMY  05/30/2013    Laminectomy Lumbar    OTHER SURGICAL HISTORY  05/30/2013    Tarsorrhaphy Lateral Bilaterally    OTHER SURGICAL HISTORY  11/22/2017    Surgery Excision Lipoma       Physical Exam  side: left Hip  There were no vitals filed for this visit.  AxO x 3 in NAD, appears stated age. Cooperative.   Assistive Device: Walker coordination and balance intact.  Skin inact over bilateral upper and lower extremities, no erythema, ecchymosis, temperature changes. No popliteal lymphadenopathy,  No other overlying lesion  mood: euthymic  Respirations non labored  Surgical hip demonstrates pain free ROM with mild tenderness to palpation over greater trochanter laterally.  The incision is midline  healing well with no signs of surrounding infection, dehiscence or drainage.   Neurovascularly back to baseline  5/5 hip flexion/knee extension/DF/PF/EHL  SILT in anastacio/saph/ per/tib distribution   Extremities warm and well perfused.  No lower extremity calf tenderness or swelling  2+ Femoral/DP/PT pulses bilaterally    IMAGING:  No images are attached to the encounter.    I personally reviewed multiple views of the hip today in clinic.  Status post side: left Total Hip Arthroplasty. The implant is well fixed, well aligned.  No evidence of gracia-implant fracture, lucency or dislocation. Leg lengths closely restored.    Impression/Plan:    Mckenna Ferguson is doing well post-operatively and happy with the results of the operation.     S/P Total side: left Hip  Arthroplasty  I talked with patient at length about activity precautions and dislocation precautions in detail. I talked with patient at length about activity precautions and dislocation precautions in detail which include avoidance of hip flexion > 90 degrees with simultaneous internal rotation.  Patient can progress activities as tolerated. And understands their permanent precautions. At this time, you may gradually increase your activities and get back to a normal, low-impact lifestyle. Please avoid running, jumping, and heavy lifting.    This was  demonstrated in the room by TORSTEN and patient verbalized understanding. Patient should also limit running, jumping, weight lifting and repetitive activity. The patient verbalizes understanding of these permanent precautions.        2. Continue HEP or outpatient PT, per protocol.  She was given a PT order today.  She can continue her home exercise plan until she gets into formal physical therapy.    3. Continue Post-operative instructions.    4. Discussed the importance of dental prophylactic dental antibiotics lifelong. Patient may request medication refill through MyChart,       Pharmacy or surgeons office.    All  questions were answered.    Follow-up 6 months with xrays at next visit.      ALONSO Garcia, PA-C, ATC  Orthopedic Physician Assisant  Adult Reconstruction and Total Joint Replacement  General Orthopedics  Department of Orthopaedic Surgery  [place] [address]  [employer] messaging preferred

## 2025-03-19 ENCOUNTER — EVALUATION (OUTPATIENT)
Dept: PHYSICAL THERAPY | Facility: CLINIC | Age: 85
End: 2025-03-19
Payer: MEDICARE

## 2025-03-19 DIAGNOSIS — Z47.1 AFTERCARE FOLLOWING LEFT HIP JOINT REPLACEMENT SURGERY: ICD-10-CM

## 2025-03-19 DIAGNOSIS — Z96.642 AFTERCARE FOLLOWING LEFT HIP JOINT REPLACEMENT SURGERY: ICD-10-CM

## 2025-03-19 PROCEDURE — 97162 PT EVAL MOD COMPLEX 30 MIN: CPT | Mod: GP

## 2025-03-19 PROCEDURE — 97110 THERAPEUTIC EXERCISES: CPT | Mod: GP

## 2025-03-19 SDOH — ECONOMIC STABILITY: GENERAL: QUALITY OF LIFE: GOOD

## 2025-03-19 ASSESSMENT — ENCOUNTER SYMPTOMS
PAIN SCALE AT HIGHEST: 5
PAIN SCALE AT LOWEST: 0
ALLEVIATING FACTORS: REST
EXACERBATED BY: LIFTING
OCCASIONAL FEELINGS OF UNSTEADINESS: 0
ALLEVIATING FACTORS: SUPPORT
DEPRESSION: 0
ALLEVIATING FACTORS: MEDICATIONS
LOSS OF SENSATION IN FEET: 0
ALLEVIATING FACTORS: RELAXATION
ALLEVIATING FACTORS: CHANGE IN POSITION
QUALITY: RADIATING
EXACERBATED BY: OVERHEAD ACTIVITY
EXACERBATED BY: MOVEMENT
QUALITY: DULL ACHE
QUALITY: TIGHT
QUALITY: DISCOMFORT
PAIN SCALE: 0

## 2025-03-19 ASSESSMENT — ACTIVITIES OF DAILY LIVING (ADL): POOR_BALANCE: 1

## 2025-03-19 NOTE — PROGRESS NOTES
Physical Therapy Evaluation and Treatment     Patient Name: Mckenna Ferguson  MRN: [id number]  Encounter date: 3/19/2025  Time Calculation  Start Time: 1215  Stop Time: 1300  Time Calculation (min): 45 min    Visit # 1 of Auth required  Visits/Dates Authorized: AUTH IS REQUIRED 40 COPAY OOP [id number]    Current Problem:   Problem List Items Addressed This Visit    None  Visit Diagnoses         Codes    Aftercare following left hip joint replacement surgery     Z47.1, Z96.642    Relevant Orders    Follow Up In Physical Therapy          Precautions:L/R TKR, Lumbar fusion   Balance       Subjective Evaluation    History of Present Illness  Date of onset: 3/19/2025  Date of surgery: 2025  Mechanism of injury: Left THR - Cohen2025   R THR - Cohen2024  C/C - pain/weakness Left hip, LE, low energy, Left drop foot  B/L neuropathy following spinal surgery - Lumbar fusion (L234)  AFO left 3yrs  Follow PT - skilled care  FINDINGS:  No fracture or dislocation is evident. There are bilateral total hip  arthroplasties. Hardware is intact as visualized. Surgical and  degenerative changes seen of the lower lumbar spine. Mild  degenerative changes seen of the SI joints and pubic symphysis. No  soft tissue gas or radiopaque foreign body is evident.    IMPRESSION:  No osseous injury or hardware complication is evident.      Quality of life: good    Pain  Current pain ratin  At best pain ratin  At worst pain ratin  Location: L eft Hip  Quality: tight, dull ache, radiating and discomfort  Relieving factors: change in position, medications, relaxation, rest and support  Aggravating factors: lifting, movement and overhead activity  Progression: improved    Social Support  Lives in: multiple-level home and apartment  Lives with: adult children    Hand dominance: left    Diagnostic Tests  X-ray: abnormal    Treatments  Previous treatment: physical therapy and medication  Patient Goals  Patient goals for therapy:  decreased pain, improved balance, increased motion, increased strength and independence with ADLs/IADLs  Patient goal: GOAL - walking with cane, Balance program, LE/lumbar strengthening            Objective     Observations   Left Hip  Positive for adhesive scar, atrophy and incision.     Neurological Testing     Sensation     Hip   Left Hip   Intact: light touch    Reflexes   Left   Patellar (L4): trace (1+)  Achilles (S1): trace (1+)    Right   Patellar (L4): trace (1+)  Achilles (S1): trace (1+)    Tenderness     Left Hip   No tenderness in the ASIS, PSIS, greater trochanter, iliac crest, inguinal ligament, ischial tuberosity, pubic tubercle and sacroiliac joint.     Lumbar Screen  Lumbar range of motion within normal limits with the following exceptions:Limited secondary to lumbar fusion    Active Range of Motion   Left Hip   Flexion: 90 degrees   Extension: 10 degrees   Abduction: 45 degrees   Adduction: 39 degrees   External rotation (90/90): 25 degrees   Internal rotation (90/90): 20 degrees     Joint Play   Left Hip   Hypomobile in the posterior hip capsule, anterior hip capsule, lateral hip capsule and long axis distraction.    Strength/Myotome Testing     Left Hip   Planes of Motion   Flexion: 4-  Extension: 4-  Abduction: 4-  Adduction: 4  External rotation: 4-  Internal rotation: 4-    Isolated Muscles   Gluteus dwight: 4-  Gluteus minimus: 4-  TFL: 4-         Balance:  Romberg: Firm Eyes Open 1 sec    Other Outcome Measures:   LEFS = 40    Treatments:     Therapeutic Exercise [id number]:     HEP / Access Codes:   Access Code: [email or web address]  URL: [email or web address]  Date: 03/19/2025  Prepared by: Terrell Lopes    Exercises  - Supine Bridge  - 2 x daily - 7 x weekly - 1 sets - 10 reps - 2 hold  - Clamshell  - 2 x daily - 7 x weekly - 1 sets - 10 reps - 1 hold  - Seated Long Arc Quad  - 2 x daily - 7 x weekly - 1 sets - 15 reps - 1 hold  - Supine Quad Set  - 2 x daily - 7 x weekly - 1 sets  - 15 reps - 5 hold  - Supine March  - 2 x daily - 7 x weekly - 1 sets - 20 reps - 1 hold  - Supine Quad Set  - 2 x daily - 7 x weekly - 1 sets - 10 reps - 3 hold  - Supine Hip Adduction Isometric with Ball  - 2 x daily - 7 x weekly - 1 sets - 10 reps - 5 hold  - Seated Hamstring Stretch  - 2 x daily - 7 x weekly - 1 sets - 2 reps - 5 hold  - Mini Squat with Counter Support  - 2 x daily - 7 x weekly - 1 sets - 10 reps - 2 hold  - Active Straight Leg Raise with Quad Set  - 2 x daily - 7 x weekly - 1 sets - 10 reps - 2 hold  - Standing Hip Extension with Counter Support  - 2 x daily - 7 x weekly - 1 sets - 15 reps - 1 hold  - Standing March with Counter Support  - 2 x daily - 7 x weekly - 1 sets - 15 reps - 1 hold  - Supine Gluteal Sets  - 2 x daily - 7 x weekly - 1 sets - 10 reps - 3 hold  - Supine March with Posterior Pelvic Tilt  - 2 x daily - 7 x weekly - 1 sets - 10 reps - 3 hold  - Long Sitting Calf Stretch with Strap  - 2 x daily - 7 x weekly - 1 sets - 3 reps - 5 hold    Assessment & Plan     Assessment  Impairments: abnormal gait, abnormal muscle firing, abnormal or restricted ROM, activity intolerance, impaired balance, impaired physical strength, lacks appropriate home exercise program, pain with function, safety issue and weight-bearing intolerance  Other impairment: wheeled walker - gait  Prognosis: good    Goals  GOAL - walking with cane, Balance program, LE/lumbar strengthening    Plan  Therapy options: will be seen for skilled physical therapy services  Planned therapy interventions: balance/weight-bearing training, body mechanics training, flexibility, functional ROM exercises, gait training, home exercise program, manual therapy, neuromuscular re-education, postural training, soft tissue mobilization, strengthening, stretching, therapeutic activities and transfer training  Frequency: 2x week  Duration in visits: 10  Duration in weeks: 5  Treatment plan discussed with: patient       Moderate complexity  due to patient's clinical presentation being evolving with changing characteristics, with comorbidities/complexities to include L THR, all of which may negatively impact rehab tolerance and progression.     SUMMARY -  PT following Left THR  Manual - passive Left hip/knee  PRE/Balance/Proprio - LE/core  Gait/balance program  Progerss to quad cane - PRN  Posture training       Goals: Frequency - 1-2x/wk x 4-5wks wks    Goals:  SHORT TERM GOALS:  Patient will report decrease in pain from 5/10 to </= 0/10 to improve quality of life.   Patient will improve L hip AROM to WFL in order to improve gait mechanics and functional mobility  Patient will demonstrate sit to stand x10 without UE to demonstrate improved LE strength.  Patient will improve 0 score to </= 10 seconds to reduce fall risk.   Patient independent with prescribed HEP  Pt Education - Independent postural and  awareness and joint protection program  LONG TERM GOALS:  Patient will be independent with HEP to promote self management of condition  Patient will perform reciprocal stair negotiation to demonstrate improved LE strength.   Patient will ambulate with least restrictive device and proper gait mechanics to promote return to prior level of function.    Functional Level - reported % (hobbies/work/recreation)

## 2025-03-25 ENCOUNTER — TREATMENT (OUTPATIENT)
Dept: PHYSICAL THERAPY | Facility: CLINIC | Age: 85
End: 2025-03-25
Payer: MEDICARE

## 2025-03-25 DIAGNOSIS — Z96.642 AFTERCARE FOLLOWING LEFT HIP JOINT REPLACEMENT SURGERY: Primary | ICD-10-CM

## 2025-03-25 DIAGNOSIS — Z47.1 AFTERCARE FOLLOWING LEFT HIP JOINT REPLACEMENT SURGERY: Primary | ICD-10-CM

## 2025-03-25 PROCEDURE — 97140 MANUAL THERAPY 1/> REGIONS: CPT | Mod: GP,CQ

## 2025-03-25 PROCEDURE — 97110 THERAPEUTIC EXERCISES: CPT | Mod: GP,CQ

## 2025-03-25 NOTE — PROGRESS NOTES
"  Physical Therapy Treatment    Patient Name: Mckenna Ferguson  MRN: [id number]  Today's Date: 3/25/2025  Time Calculation  Start Time: 1052  Stop Time: 1142  Time Calculation (min): 50 min  PT Therapeutic Procedures Time Entry  Manual Therapy Time Entry: 20  Therapeutic Exercise Time Entry: 30,      Current Problem  Problem List Items Addressed This Visit    None  Visit Diagnoses         Codes    Aftercare following left hip joint replacement surgery    -  Primary Z47.1, Z96.642            Insurance:  Number of Treatments Authorized: 2/12  Certification Period Start Date: 03/19/25  Certification Period End Date: 06/16/25      Subjective   General  Reason for Referral: L MERCEDEZ  Referred By: Noel  Past Medical History Relevant to Rehab: back surgery 12 years ago, with residual foot drop L  General Comment: Patient feels as though she is doing well.  Some challenge with bridging due to L and hip pulling/tightness. Notes L hip/LE weakness prior to TKA due to previous lumbar surgery.    Performing HEP?: Yes - bridging difficult    Precautions     Pain       Objective   L hip flexed in standing    Treatments:      Therapeutic Exercise  Therapeutic Exercise Activity 1: incline gastroc stretch x 1 min  Therapeutic Exercise Activity 2: incline heel raises x 10  Therapeutic Exercise Activity 3: standing alt Hip ABD x 1 min - focus on quad engagement and neutral hip rotation  Therapeutic Exercise Activity 4: tall sitting with ADD ball squeeze 5\" x 1 min  Therapeutic Exercise Activity 5: tall sitting with GTB B hip ABD 5\" x 1 min  Therapeutic Exercise Activity 6: 2#/2# alt LAQ x 2 min  Therapeutic Exercise Activity 7: alt foot tap to 8\" step - difficulty with FWB L LE - stopped  Therapeutic Exercise Activity 8: standing iso glut squeeze 5\" x 10  Therapeutic Exercise Activity 9: sit to stand from chair with 1 airex pad x 5  Therapeutic Exercise Activity 10: SciFit with seat slightly reclined - Man L3 x 6 min  Therapeutic " "Exercise Activity 11: TRX mini squats x 10 (patient would like to use her TRX at home)  Therapeutic Exercise Activity 12: supine lying iso glut squeeze 5\" x 10  Therapeutic Exercise Activity 13: mini bridge x 10         Manual Therapy  Manual Therapy Activity 1: supine gentle pin and roll/stretch L TF, TFL, quad  Manual Therapy Activity 2: MFR L leg pull  Manual Therapy Activity 3: PROM L hip with precations                     OP EDUCATION:  Outpatient Education  Education Comment: Access Code: [email or web address]  URL: [email or web address]  Date: 03/25/2025  Prepared by: Edilia Ling    Exercises  - Seated Hip Abduction with Resistance  - 1 x daily - 7 x weekly - 2 sets - 10 reps - 5 hold  - Sit to Stand Without Arm Support  - 1 x daily - 7 x weekly - 10 reps  - Standing Gluteal Sets  - 1 x daily - 7 x weekly - 10 reps - 5 hold    Assessment:  PT Assessment  Assessment Comment: Patient is showing good porgress in regards to her L TKA.  She has a recumbant bike and TRX straps at home that she sould like to use.  Gentle soft tissue releases to L ant hip with resulting increased ROM emmanuel for hip to neutral extension. Felt good.    Plan:  OP PT Plan  Certification Period Start Date: 03/19/25  Certification Period End Date: 06/16/25  Number of Treatments Authorized: 2/12    Goals:       Lucinda Ling, PTA  "

## 2025-03-28 ENCOUNTER — APPOINTMENT (OUTPATIENT)
Dept: PHYSICAL THERAPY | Facility: CLINIC | Age: 85
End: 2025-03-28
Payer: MEDICARE

## 2025-04-01 ENCOUNTER — TREATMENT (OUTPATIENT)
Dept: PHYSICAL THERAPY | Facility: CLINIC | Age: 85
End: 2025-04-01
Payer: MEDICARE

## 2025-04-01 DIAGNOSIS — Z96.642 AFTERCARE FOLLOWING LEFT HIP JOINT REPLACEMENT SURGERY: Primary | ICD-10-CM

## 2025-04-01 DIAGNOSIS — Z47.1 AFTERCARE FOLLOWING LEFT HIP JOINT REPLACEMENT SURGERY: Primary | ICD-10-CM

## 2025-04-01 PROCEDURE — 97530 THERAPEUTIC ACTIVITIES: CPT | Mod: GP,CQ

## 2025-04-01 PROCEDURE — 97140 MANUAL THERAPY 1/> REGIONS: CPT | Mod: GP,CQ

## 2025-04-01 PROCEDURE — 97110 THERAPEUTIC EXERCISES: CPT | Mod: GP,CQ

## 2025-04-01 NOTE — PROGRESS NOTES
Physical Therapy Treatment    Patient Name: Mckenna Ferguson  MRN: [id number]  Today's Date: 4/1/2025  Time Calculation  Start Time: 1043  Stop Time: 1138  Time Calculation (min): 55 min  PT Therapeutic Procedures Time Entry  Manual Therapy Time Entry: 12  Neuromuscular Re-Education Time Entry: 5  Therapeutic Exercise Time Entry: 30  Therapeutic Activity Time Entry: 8,      Current Problem  Problem List Items Addressed This Visit    None  Visit Diagnoses         Codes    Aftercare following left hip joint replacement surgery    -  Primary Z47.1, Z96.642            Insurance:  Number of Treatments Authorized: 3/12  Certification Period Start Date: 03/19/25  Certification Period End Date: 06/16/25      Subjective   General  Reason for Referral: L MERCEDEZ  Referred By: Noel  Past Medical History Relevant to Rehab: back surgery 12 years ago, with residual foot drop L  General Comment: Patient would like to be able to use her SPC and get rid of the walker.    Performing HEP?: Yes    Precautions     Pain       Objective   FF at the waist with gait    Treatments:    Therapeutic Exercise  Therapeutic Exercise Activity 1: SciFit Man L2 x 5 min  Therapeutic Exercise Activity 2: incline g/s stretch x 1 min  Therapeutic Exercise Activity 3: 2#/2# alt LAQ x 2 min  Therapeutic Exercise Activity 4: standing at // bars R/L hip ABD 2 x 10 ea - focus on technique  Therapeutic Exercise Activity 5: standing march at // bars - focus on technique and control  Therapeutic Exercise Activity 6: supine lying L leg extended, R knee bent - for hip extension stretch to neutral  Therapeutic Exercise Activity 7: modified bridge over bolster with B SAQ 2 x 5  Therapeutic Exercise Activity 8: SL clamshell x 20  Therapeutic Exercise Activity 9: SL hip ABD with mod assist x 5  Therapeutic Exercise Activity 10: standing review hip abd and hip flexion technique    Balance/Neuromuscular Re-Education  Balance/Neuromuscular Re-Education Activity 1: DLB on  "airex pad with iso glut squeeze 5\" x 10  Balance/Neuromuscular Re-Education Activity 2: weight shifting on airex pad side to side with pause on L x 1 min    Manual Therapy  Manual Therapy Activity 1: supine gentle pin and roll/stretch L TF, TFL, quad  Manual Therapy Activity 2: MFR L leg pull  Manual Therapy Activity 3: PROM L hip with precations    Therapeutic Activity  Therapeutic Activity 1: sit to stand from chair with 1 airex pad x 10  Therapeutic Activity 2: sit to stand from chair with 1 airex pad - holding 4# x 5                OP EDUCATION:  Outpatient Education  Education Comment: stand tall. Focus on exercise technique.    Assessment:  PT Assessment  Assessment Comment: Emphasis of today's session was to improve mechanics and technique with strengthening exercises as well as hip mobility to neutral extension.  Difficulty with glut activation.  More upright after session.    Plan:  OP PT Plan  Certification Period Start Date: 03/19/25  Certification Period End Date: 06/16/25  Number of Treatments Authorized: 3/12    Goals:       Lucinda Ling PTA  "

## 2025-04-04 ENCOUNTER — TREATMENT (OUTPATIENT)
Dept: PHYSICAL THERAPY | Facility: CLINIC | Age: 85
End: 2025-04-04
Payer: MEDICARE

## 2025-04-04 DIAGNOSIS — Z47.1 AFTERCARE FOLLOWING LEFT HIP JOINT REPLACEMENT SURGERY: Primary | ICD-10-CM

## 2025-04-04 DIAGNOSIS — Z96.642 AFTERCARE FOLLOWING LEFT HIP JOINT REPLACEMENT SURGERY: Primary | ICD-10-CM

## 2025-04-04 PROCEDURE — 97110 THERAPEUTIC EXERCISES: CPT | Mod: GP,CQ

## 2025-04-04 PROCEDURE — 97140 MANUAL THERAPY 1/> REGIONS: CPT | Mod: GP,CQ

## 2025-04-04 PROCEDURE — 97530 THERAPEUTIC ACTIVITIES: CPT | Mod: GP,CQ

## 2025-04-04 ASSESSMENT — PAIN SCALES - GENERAL: PAINLEVEL_OUTOF10: 0 - NO PAIN

## 2025-04-04 ASSESSMENT — PAIN - FUNCTIONAL ASSESSMENT: PAIN_FUNCTIONAL_ASSESSMENT: 0-10

## 2025-04-04 NOTE — PROGRESS NOTES
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  Patient notes some night time pain L anterior hip, where it's tight.    Performing HEP?: Yes    Precautions     Pain  Pain Assessment: 0-10  0-10 (Numeric) Pain Score: 0 - No pain  Pain Location: Hip  Pain Orientation: Left    Objective       Treatments:    Therapeutic Exercise  Therapeutic Exercise Activity 1: SciFit Man L2 x 6 min  Therapeutic Exercise Activity 2: incline gastroc stretch x 1 min  Therapeutic Exercise Activity 3: incline heel raises x 10  Therapeutic Exercise Activity 4: standing iso glut squeeze 5\" x 10  Therapeutic Exercise Activity 5: Valislide R/L hip ABD in standing x 10 ea  Therapeutic Exercise Activity 6: Valislide R/L hip extension at // bars x 10 ea  Therapeutic Exercise Activity 7: Valislide R/L hip diagonals x 10 ea  Therapeutic Exercise Activity 8: lateral stepping along // bar without UE x 5 passes  Therapeutic Exercise Activity 9: bridging 2 x 10  Therapeutic Exercise Activity 10: HL march x 1 min         Manual Therapy  Manual Therapy Activity 1: supine gentle pin and " "roll/stretch L RF, psoas, TFL, quad  Manual Therapy Activity 2: PROM L hip with precations    Therapeutic Activity  Therapeutic Activity 1: sit to stand from chair with 1 airex pad x 10  Therapeutic Activity 2: Lateral step over 6\" jesenia R/L x 2 min                OP EDUCATION:       Assessment:  PT Assessment  Assessment Comment: Patient continues to lack L hip extension to neutral,  Able to improve mobility with ther ex, soft tissue release and stretching.  Used Valislide with standing hip planes today to minimize hip/knee flexion compensation.  Increased height with bridging.    Plan:  OP PT Plan  Certification Period Start Date: 03/19/25  Certification Period End Date: 06/16/25  Number of Treatments Authorized: 4/12    Goals:       Lucinda Ling PTA  "

## 2025-04-08 ENCOUNTER — TREATMENT (OUTPATIENT)
Dept: PHYSICAL THERAPY | Facility: CLINIC | Age: 85
End: 2025-04-08
Payer: MEDICARE

## 2025-04-08 DIAGNOSIS — Z96.642 AFTERCARE FOLLOWING LEFT HIP JOINT REPLACEMENT SURGERY: ICD-10-CM

## 2025-04-08 DIAGNOSIS — Z47.1 AFTERCARE FOLLOWING LEFT HIP JOINT REPLACEMENT SURGERY: ICD-10-CM

## 2025-04-08 PROCEDURE — 97140 MANUAL THERAPY 1/> REGIONS: CPT | Mod: GP

## 2025-04-08 PROCEDURE — 97110 THERAPEUTIC EXERCISES: CPT | Mod: GP

## 2025-04-08 ASSESSMENT — PAIN - FUNCTIONAL ASSESSMENT: PAIN_FUNCTIONAL_ASSESSMENT: 0-10

## 2025-04-08 ASSESSMENT — PAIN SCALES - GENERAL: PAINLEVEL_OUTOF10: 0 - NO PAIN

## 2025-04-08 NOTE — PROGRESS NOTES
"Physical Therapy Treatment    Patient Name: Mckenna Ferguson  MRN: [id number]  Today's Date: 4/8/2025  Time Calculation  Start Time: 1030  Stop Time: 1115  Time Calculation (min): 45 min    Insurance:  Visit number: Number of Treatments Authorized: 5/12  Authorization info: Approved [employer] [facility] Authorization   12v total approved , 1 for eval and 11 for scheduling   Dates of Service: 3/19/25 to 6/16/25   [id number]  DX- Z47.1  REF: [id number]  AUTH IS REQUIRED 40 COPAY OOP 6750  Insurance Type: Payor: [employer] / Plan: [employer] MEDICARE ADVANTAGE / Product Type: *No Product type* /     Current Problem   1. Aftercare following left hip joint replacement surgery  Follow Up In Physical Therapy          Subjective   General   DOS - 1/28/25  Reason for Referral: L MERCEDEZ  Referred By: Noel  Past Medical History Relevant to Rehab: back surgery 12 years ago, with residual foot drop L  General Comment: Patient is working on standing tall and looking ahead during ambulation.  Patient notes some night time pain L anterior hip, where it's tight.  Precautions::L/R TKR, Lumbar fusion , balance     Pain 0/10  Pain Assessment: 0-10  0-10 (Numeric) Pain Score: 0 - No pain  Pain Location: Hip  Pain Orientation: Left  Post Treatment Pain Level 0/10    Objective       Treatments:  Therapeutic Exercise:  Therapeutic Exercise  Therapeutic Exercise Activity 1: SciFit Man L2 x 6 min  Therapeutic Exercise Activity 2: incline gastroc stretch x 1 min  Therapeutic Exercise Activity 3: incline heel raises x 10  Therapeutic Exercise Activity 4: standing iso glut squeeze 5\" x 10  Therapeutic Exercise Activity 5: TG - squats 3x10 (ECCENTRIC LOWERING)  Therapeutic Exercise Activity 6: sit-to-stand 2x 10 slow eccentric sitting  Therapeutic Exercise Activity 7: Valislide R/L hip diagonals x 10 ea  Therapeutic Exercise Activity 8: lateral stepping along // bar without UE x 5 passes  Therapeutic Exercise Activity 9: bridging 2min (multiple " rest pds)  Therapeutic Exercise Activity 10: HL march x 1 min  Therapeutic Exercise Activity 11: LAQ - R/L x 2min (Eccentric lowering)  Therapeutic Exercise Activity 12: seated scap retraction x20     Manual:  Manual Therapy  Manual Therapy Performed: Yes  Manual Therapy Activity 1: passive Left - HS/glute/pirif/quad  Manual Therapy Activity 2: PROM - L hip (THR Precautions) - flex/abd  Manual Therapy Activity 3: Light Left Leg pull      Assessment   Assessment:   PT Assessment  Assessment Comment: Patient continues to lack L hip extension to neutral,  Able to improve mobility with ther ex, soft tissue release and stretching.  Used Valislide with standing hip planes today to minimize hip/knee flexion compensation.  Increased height with bridging.    Plan:   OP PT Plan  Certification Period Start Date: 03/19/25  Certification Period End Date: 06/16/25  Number of Treatments Authorized: 5/12    OP EDUCATION:   Sit-stand training  Posture bra    Goals:

## 2025-04-11 ENCOUNTER — TREATMENT (OUTPATIENT)
Dept: PHYSICAL THERAPY | Facility: CLINIC | Age: 85
End: 2025-04-11
Payer: MEDICARE

## 2025-04-11 DIAGNOSIS — Z47.1 AFTERCARE FOLLOWING LEFT HIP JOINT REPLACEMENT SURGERY: Primary | ICD-10-CM

## 2025-04-11 DIAGNOSIS — Z96.642 AFTERCARE FOLLOWING LEFT HIP JOINT REPLACEMENT SURGERY: Primary | ICD-10-CM

## 2025-04-11 PROCEDURE — 97112 NEUROMUSCULAR REEDUCATION: CPT | Mod: GP,CQ

## 2025-04-11 PROCEDURE — 97110 THERAPEUTIC EXERCISES: CPT | Mod: GP,CQ

## 2025-04-11 PROCEDURE — 97140 MANUAL THERAPY 1/> REGIONS: CPT | Mod: GP,CQ

## 2025-04-11 ASSESSMENT — PAIN SCALES - GENERAL: PAINLEVEL_OUTOF10: 0 - NO PAIN

## 2025-04-11 ASSESSMENT — PAIN - FUNCTIONAL ASSESSMENT: PAIN_FUNCTIONAL_ASSESSMENT: 0-10

## 2025-04-11 NOTE — PROGRESS NOTES
"  Physical Therapy Treatment    Patient Name: Mckenna Ferguson  MRN: [id number]  Today's Date: 4/11/2025  Time Calculation  Start Time: 1013  Stop Time: 1104  Time Calculation (min): 51 min  PT Therapeutic Procedures Time Entry  Manual Therapy Time Entry: 10  Neuromuscular Re-Education Time Entry: 10  Therapeutic Exercise Time Entry: 23  Therapeutic Activity Time Entry: 8,      Current Problem  Problem List Items Addressed This Visit    None  Visit Diagnoses         Codes    Aftercare following left hip joint replacement surgery    -  Primary Z47.1, Z96.642            Insurance:  Number of Treatments Authorized: 6/12  Certification Period Start Date: 03/19/25  Certification Period End Date: 06/16/25      Subjective   General  Reason for Referral: L MERCEDEZ  Referred By: Noel  Past Medical History Relevant to Rehab: back surgery 12 years ago, with residual foot drop L  General Comment: Patient notes she is able to lie supine with much less tightness in the front of her L hip. Continues to rely heavily on UEs to assist with trnasfers and for balance.    Performing HEP?: Yes    Precautions     Pain  Pain Assessment: 0-10  0-10 (Numeric) Pain Score: 0 - No pain  Pain Location: Hip  Pain Orientation: Left    Objective       Treatments:    Therapeutic Exercise  Therapeutic Exercise Activity 1: SciFit Man L3 x 6 min  Therapeutic Exercise Activity 2: incline gastroc stretch x 1 min  Therapeutic Exercise Activity 3: incline heel raises x 10  Therapeutic Exercise Activity 4: R foot on 8\" step for gentle L HF stretch 3 x 10\"  Therapeutic Exercise Activity 5: bridging 2min (multiple rest pds)    Balance/Neuromuscular Re-Education  Balance/Neuromuscular Re-Education Activity 1: S Stance weight shifting F/B with gentle arm swing R/L lead 2 x 1 min ea - CGA  Balance/Neuromuscular Re-Education Activity 2: lateral stepping along airex beam minimal UE assist x 2 min    Manual Therapy  Manual Therapy Activity 1: passive Left - " "HS/glute/pirif/quad  Manual Therapy Activity 2: PROM - L hip (THR Precautions) - flex/abd  Manual Therapy Activity 3: Light Left Leg pull    Therapeutic Activity  Therapeutic Activity 1: 4\" FWD step up R/L lead x 10 ea  Therapeutic Activity 2: 4\" lateral step overs x 1 min  Therapeutic Activity 3: sit to stand from chair with one airex x 10 - eccentric focus  Therapeutic Activity 4: sit to stand from chair without airex x 5 eccentric focus                OP EDUCATION:       Assessment:  PT Assessment  Assessment Comment: Emphasis today on progressing balance and stabilization exercises.  Challenged with F/B rocking/weight shifting.  Cueing to increase LE engagement and decrease UE assist    Plan:  OP PT Plan  Certification Period Start Date: 03/19/25  Certification Period End Date: 06/16/25  Number of Treatments Authorized: 6/12    Goals:       Lucinda Ling, PTA  "

## 2025-04-15 ENCOUNTER — TREATMENT (OUTPATIENT)
Dept: PHYSICAL THERAPY | Facility: CLINIC | Age: 85
End: 2025-04-15
Payer: MEDICARE

## 2025-04-15 DIAGNOSIS — Z47.1 AFTERCARE FOLLOWING LEFT HIP JOINT REPLACEMENT SURGERY: Primary | ICD-10-CM

## 2025-04-15 DIAGNOSIS — Z96.642 AFTERCARE FOLLOWING LEFT HIP JOINT REPLACEMENT SURGERY: Primary | ICD-10-CM

## 2025-04-15 PROCEDURE — 97112 NEUROMUSCULAR REEDUCATION: CPT | Mod: GP,CQ

## 2025-04-15 PROCEDURE — 97140 MANUAL THERAPY 1/> REGIONS: CPT | Mod: GP,CQ

## 2025-04-15 PROCEDURE — 97530 THERAPEUTIC ACTIVITIES: CPT | Mod: GP,CQ

## 2025-04-15 PROCEDURE — 97110 THERAPEUTIC EXERCISES: CPT | Mod: GP,CQ

## 2025-04-15 ASSESSMENT — PAIN SCALES - GENERAL: PAINLEVEL_OUTOF10: 4

## 2025-04-15 ASSESSMENT — PAIN - FUNCTIONAL ASSESSMENT: PAIN_FUNCTIONAL_ASSESSMENT: 0-10

## 2025-04-15 NOTE — PROGRESS NOTES
Physical Therapy Treatment    Patient Name: Mckenna Ferguson  MRN: [id number]  Today's Date: 4/15/2025  Time Calculation  Start Time: 1047  Stop Time: 1142  Time Calculation (min): 55 min  PT Therapeutic Procedures Time Entry  Manual Therapy Time Entry: 15  Neuromuscular Re-Education Time Entry: 15  Therapeutic Exercise Time Entry: 15  Therapeutic Activity Time Entry: 10,      Current Problem  Problem List Items Addressed This Visit    None  Visit Diagnoses         Codes    Aftercare following left hip joint replacement surgery    -  Primary Z47.1, Z96.642            Insurance:  Number of Treatments Authorized: 7/12  Certification Period Start Date: 03/19/25  Certification Period End Date: 06/16/25      Subjective   General  Reason for Referral: L MERCEDEZ  Referred By: Noel  Past Medical History Relevant to Rehab: back surgery 12 years ago, with residual foot drop L  General Comment: Patient notes she is having some back pain today - in the area of her prior back surgery.  Hip is doing well.  Would like to be able to progress to the cane.    Performing HEP?: Yes    Precautions     Pain  Pain Assessment: 0-10  0-10 (Numeric) Pain Score: 4  Pain Location: Back  Pain Orientation: Right    Objective       Treatments:    Therapeutic Exercise  Therapeutic Exercise Activity 1: SciFit man L4 x 6 min  Therapeutic Exercise Activity 2: seated 75cm SB RO x 2 min  Therapeutic Exercise Activity 3: incline g/s stretch x 1 min  Therapeutic Exercise Activity 4: HL GTB R/L iso clam 2 x 1 min ea side  Therapeutic Exercise Activity 5: bridging 2min (multiple rest pds)    Balance/Neuromuscular Re-Education  Balance/Neuromuscular Re-Education Activity 1: side stepping along airex beam without UE R/L x 2  ea dir with CGA  Balance/Neuromuscular Re-Education Activity 2: DLB on firm surface with purple PB rows x 10  Balance/Neuromuscular Re-Education Activity 3: S. Stance with purple PB alt shld ext 2 x 1 min    Manual Therapy  Manual  Therapy Activity 1: MFR gentle leg pull B  Manual Therapy Activity 2: passive Left/Right - HS/glute/pirif/quad  Manual Therapy Activity 3: PROM - L hip (THR Precautions) - flex/abd    Therapeutic Activity  Therapeutic Activity 1: alt hip ext with opp arm lift off // bar 2 x 1 min  Therapeutic Activity 2: sit to stand from chair with one airex x 10 - eccentric focus  Therapeutic Activity 3: ambulating with FWW - pushing like a cart, decreasing UE reliance x 50'                OP EDUCATION:       Assessment:  PT Assessment  Assessment Comment: Patient arrived with an increase in her R LBP today.  Able to decrease pain with stretching and ther ex.  Improving emmanuel for L LE WB and proprioceptive skills.    Plan:  OP PT Plan  Certification Period Start Date: 03/19/25  Certification Period End Date: 06/16/25  Number of Treatments Authorized: 7/12    Goals:       Lucinda Ling, PTA

## 2025-04-18 ENCOUNTER — APPOINTMENT (OUTPATIENT)
Dept: PHYSICAL THERAPY | Facility: CLINIC | Age: 85
End: 2025-04-18
Payer: MEDICARE

## 2025-04-22 ENCOUNTER — TREATMENT (OUTPATIENT)
Dept: PHYSICAL THERAPY | Facility: CLINIC | Age: 85
End: 2025-04-22
Payer: MEDICARE

## 2025-04-22 DIAGNOSIS — Z47.1 AFTERCARE FOLLOWING LEFT HIP JOINT REPLACEMENT SURGERY: Primary | ICD-10-CM

## 2025-04-22 DIAGNOSIS — Z96.642 AFTERCARE FOLLOWING LEFT HIP JOINT REPLACEMENT SURGERY: Primary | ICD-10-CM

## 2025-04-22 PROCEDURE — 97530 THERAPEUTIC ACTIVITIES: CPT | Mod: GP,CQ

## 2025-04-22 PROCEDURE — 97110 THERAPEUTIC EXERCISES: CPT | Mod: GP,CQ

## 2025-04-22 ASSESSMENT — PAIN - FUNCTIONAL ASSESSMENT: PAIN_FUNCTIONAL_ASSESSMENT: 0-10

## 2025-04-22 ASSESSMENT — PAIN SCALES - GENERAL: PAINLEVEL_OUTOF10: 0 - NO PAIN

## 2025-04-22 NOTE — PROGRESS NOTES
"  Physical Therapy Treatment    Patient Name: Mckenna Ferguson  MRN: [id number]  Today's Date: 4/22/2025  Time Calculation  Start Time: 0957  Stop Time: 1046  Time Calculation (min): 49 min  PT Therapeutic Procedures Time Entry  Therapeutic Exercise Time Entry: 19  Therapeutic Activity Time Entry: 30,      Current Problem  Problem List Items Addressed This Visit    None  Visit Diagnoses         Codes      Aftercare following left hip joint replacement surgery    -  Primary Z47.1, Z96.642            Insurance:  Number of Treatments Authorized: 8/12  Certification Period Start Date: 03/19/25  Certification Period End Date: 06/16/25      Subjective   General  Reason for Referral: L MERCEDEZ  Referred By: Noel  Past Medical History Relevant to Rehab: back surgery 12 years ago, with residual foot drop L  General Comment: Patient has been trying to walk with her SPC in her house for short distances.  She fatigues quickly with reduced endurance. Back pain is less today.    Performing HEP?: Yes    Precautions     Pain  Pain Assessment: 0-10  0-10 (Numeric) Pain Score: 0 - No pain  Pain Location: Hip  Pain Orientation: Left    Objective   L LE appears longer in WB  L LE abducted in stance    Treatments:    Therapeutic Exercise  Therapeutic Exercise Activity 1: SciFit man L4 x 6 min  Therapeutic Exercise Activity 2: Standing BTB L TKE with weight shift L 3\" hold 2 x 10  Therapeutic Exercise Activity 3: standing iso glut sets 5\" x 10              Therapeutic Activity  Therapeutic Activity 1: gait train with SPC in R hand 2 x 40'; 2 x 60'  Therapeutic Activity 2: sit to stand from chair with emphasis on L LE engagement x 10  Therapeutic Activity 3: R/L lateral stepping over cane with push off and return x 5 ea dir  Therapeutic Activity 4: R/L lateral step over cane - side step right onto 1\" platform - improved form and technique  Therapeutic Activity 5: T/L retro step over cane and return x 10 ea                OP " "EDUCATION:  Outpatient Education  Education Comment: Access Code: [email or web address]  URL: [email or web address]  Date: 04/22/2025  Prepared by: Edilia Ling    Exercises  - Standing Terminal Knee Extension with Resistance  - 2 x daily - 7 x weekly - 10 reps - 3 hold    Assessment:  PT Assessment  Assessment Comment: Verbal cues to make her L leg \"strong\" when she weightbears through L LE with gait.  L LE appears longer in stance and she notes her L leg is more prone to drag on the floor.  Recommend she obtain a heel lift for her right shoe to help overcome length discrepancy.  She ambulated with safe technique when using SPC.  Limited by endurance.    Plan:  OP PT Plan  Certification Period Start Date: 03/19/25  Certification Period End Date: 06/16/25  Number of Treatments Authorized: 8/12    Goals:       Lucinda Ling, PTA  "

## 2025-04-23 ENCOUNTER — APPOINTMENT (OUTPATIENT)
Dept: PRIMARY CARE | Facility: CLINIC | Age: 85
End: 2025-04-23
Payer: MEDICARE

## 2025-04-23 VITALS
WEIGHT: 169 LBS | SYSTOLIC BLOOD PRESSURE: 126 MMHG | DIASTOLIC BLOOD PRESSURE: 66 MMHG | HEIGHT: 66 IN | BODY MASS INDEX: 27.16 KG/M2

## 2025-04-23 DIAGNOSIS — I10 HYPERTENSION, UNSPECIFIED TYPE: ICD-10-CM

## 2025-04-23 DIAGNOSIS — E03.9 HYPOTHYROIDISM, UNSPECIFIED TYPE: ICD-10-CM

## 2025-04-23 DIAGNOSIS — R53.83 OTHER FATIGUE: Primary | ICD-10-CM

## 2025-04-23 DIAGNOSIS — E78.00 PURE HYPERCHOLESTEROLEMIA: ICD-10-CM

## 2025-04-23 DIAGNOSIS — E55.9 VITAMIN D DEFICIENCY: ICD-10-CM

## 2025-04-23 PROBLEM — H91.90 HEARING LOSS: Status: ACTIVE | Noted: 2023-10-04

## 2025-04-23 PROBLEM — R10.11 RIGHT UPPER QUADRANT ABDOMINAL PAIN: Status: ACTIVE | Noted: 2025-04-23

## 2025-04-23 PROBLEM — R07.89 CHEST DISCOMFORT: Status: ACTIVE | Noted: 2025-04-23

## 2025-04-23 PROBLEM — L02.92 FURUNCLE: Status: ACTIVE | Noted: 2025-04-23

## 2025-04-23 PROBLEM — Z86.39 HISTORY OF HYPOTHYROIDISM: Status: ACTIVE | Noted: 2025-04-23

## 2025-04-23 PROBLEM — E87.0 HYPERNATREMIA: Status: ACTIVE | Noted: 2025-04-23

## 2025-04-23 PROBLEM — K21.9 GASTROESOPHAGEAL REFLUX DISEASE WITHOUT ESOPHAGITIS: Status: ACTIVE | Noted: 2025-01-30

## 2025-04-23 PROBLEM — H04.122 DRY EYE SYNDROME OF LEFT EYE: Status: ACTIVE | Noted: 2025-04-23

## 2025-04-23 PROBLEM — Z98.49 STATUS POST CATARACT EXTRACTION: Status: ACTIVE | Noted: 2025-01-30

## 2025-04-23 PROBLEM — N39.0 URINARY TRACT BACTERIAL INFECTIONS: Status: ACTIVE | Noted: 2025-04-23

## 2025-04-23 PROBLEM — R00.2 PALPITATIONS: Status: ACTIVE | Noted: 2023-06-14

## 2025-04-23 PROBLEM — M79.643 PAIN OF HAND: Status: ACTIVE | Noted: 2025-04-23

## 2025-04-23 PROBLEM — M79.89 SWELLING OF LOWER EXTREMITY: Status: ACTIVE | Noted: 2025-04-23

## 2025-04-23 PROBLEM — A49.9 URINARY TRACT BACTERIAL INFECTIONS: Status: ACTIVE | Noted: 2025-04-23

## 2025-04-23 PROBLEM — L98.9 DISORDER OF SKIN: Status: ACTIVE | Noted: 2025-04-23

## 2025-04-23 PROCEDURE — 3078F DIAST BP <80 MM HG: CPT | Performed by: INTERNAL MEDICINE

## 2025-04-23 PROCEDURE — 1036F TOBACCO NON-USER: CPT | Performed by: INTERNAL MEDICINE

## 2025-04-23 PROCEDURE — 1159F MED LIST DOCD IN RCRD: CPT | Performed by: INTERNAL MEDICINE

## 2025-04-23 PROCEDURE — 3074F SYST BP LT 130 MM HG: CPT | Performed by: INTERNAL MEDICINE

## 2025-04-23 PROCEDURE — 99214 OFFICE O/P EST MOD 30 MIN: CPT | Performed by: INTERNAL MEDICINE

## 2025-04-23 RX ORDER — GABAPENTIN 400 MG/1
400 CAPSULE ORAL NIGHTLY
COMMUNITY
Start: 2025-03-21

## 2025-04-23 NOTE — PROGRESS NOTES
"Subjective   Patient ID: Mckenna Ferguson is a 85 y.o. female who presents for Establish Care.    HPI   New patient visit  Complaining of feeling fatigue feeling chills all the time since she got discharged from rehab after the hip surgery  Has not seen a doctor in 3 years  She does see her rheumatologist and cardiologist  Follow-up on hypertension high cholesterol hypothyroidism    Past medical history: Hypertension, high cholesterol, hypothyroidism, CECI, cholecystectomy, back surgery, bilateral total hip replacement, left foot drop from back surgery, peripheral arterial disease paroxysmal atrial fibrillation, rheumatoid arthritis, osteoarthritis  Review of Systems    Objective   /66   Ht 1.676 m (5' 6\")   Wt 76.7 kg (169 lb)   BMI 27.28 kg/m²     Physical Exam  Vitals reviewed.   Constitutional:       Appearance: Normal appearance.   HENT:      Head: Normocephalic and atraumatic.      Right Ear: Tympanic membrane, ear canal and external ear normal.      Left Ear: Tympanic membrane, ear canal and external ear normal.      Nose: Nose normal.      Mouth/Throat:      Pharynx: Oropharynx is clear.   Eyes:      Extraocular Movements: Extraocular movements intact.      Conjunctiva/sclera: Conjunctivae normal.      Pupils: Pupils are equal, round, and reactive to light.   Cardiovascular:      Rate and Rhythm: Normal rate and regular rhythm.      Pulses: Normal pulses.      Heart sounds: Normal heart sounds.   Pulmonary:      Effort: Pulmonary effort is normal.      Breath sounds: Normal breath sounds.   Abdominal:      General: Abdomen is flat. Bowel sounds are normal.      Palpations: Abdomen is soft.   Musculoskeletal:      Cervical back: Normal range of motion and neck supple.   Skin:     General: Skin is warm and dry.   Neurological:      General: No focal deficit present.      Mental Status: She is alert and oriented to person, place, and time.   Psychiatric:         Mood and Affect: Mood normal. "         Assessment/Plan   Problem List Items Addressed This Visit           ICD-10-CM       Cardiac and Vasculature    Hypertension I10    Relevant Orders    CBC    Comprehensive Metabolic Panel    Pure hypercholesterolemia E78.00    Relevant Orders    Lipid Panel       Endocrine/Metabolic    Hypothyroidism E03.9    Relevant Orders    Thyroid Stimulating Hormone    Vitamin B12    Vitamin D deficiency E55.9    Relevant Orders    Vitamin D 25-Hydroxy,Total (for eval of Vitamin D levels)    Vitamin B12       Symptoms and Signs    Fatigue - Primary R53.83     Patient's chart reviewed  Medication list reviewed  X-rays labs reviewed  We will do blood work  Will check vitamin B12 make sure she is not anemic which is causing the fatigue  Will also check vitamin D  Follow-up after blood work

## 2025-04-24 ENCOUNTER — APPOINTMENT (OUTPATIENT)
Dept: PHYSICAL THERAPY | Facility: CLINIC | Age: 85
End: 2025-04-24
Payer: MEDICARE

## 2025-04-28 ENCOUNTER — APPOINTMENT (OUTPATIENT)
Dept: PHYSICAL THERAPY | Facility: CLINIC | Age: 85
End: 2025-04-28
Payer: MEDICARE

## 2025-04-28 DIAGNOSIS — Z96.642 AFTERCARE FOLLOWING LEFT HIP JOINT REPLACEMENT SURGERY: Primary | ICD-10-CM

## 2025-04-28 DIAGNOSIS — Z47.1 AFTERCARE FOLLOWING LEFT HIP JOINT REPLACEMENT SURGERY: Primary | ICD-10-CM

## 2025-04-30 ENCOUNTER — APPOINTMENT (OUTPATIENT)
Dept: PHYSICAL THERAPY | Facility: CLINIC | Age: 85
End: 2025-04-30
Payer: MEDICARE

## 2025-04-30 LAB
25(OH)D3+25(OH)D2 SERPL-MCNC: 36 NG/ML (ref 30–100)
ALBUMIN SERPL-MCNC: 4.7 G/DL (ref 3.6–5.1)
ALP SERPL-CCNC: 73 U/L (ref 37–153)
ALT SERPL-CCNC: 10 U/L (ref 6–29)
ANION GAP SERPL CALCULATED.4IONS-SCNC: 13 MMOL/L (CALC) (ref 7–17)
AST SERPL-CCNC: 14 U/L (ref 10–35)
BILIRUB SERPL-MCNC: 0.5 MG/DL (ref 0.2–1.2)
BUN SERPL-MCNC: 20 MG/DL (ref 7–25)
CALCIUM SERPL-MCNC: 10.2 MG/DL (ref 8.6–10.4)
CHLORIDE SERPL-SCNC: 106 MMOL/L (ref 98–110)
CHOLEST SERPL-MCNC: 156 MG/DL
CHOLEST/HDLC SERPL: 2.5 (CALC)
CO2 SERPL-SCNC: 25 MMOL/L (ref 20–32)
CREAT SERPL-MCNC: 0.87 MG/DL (ref 0.6–0.95)
EGFRCR SERPLBLD CKD-EPI 2021: 65 ML/MIN/1.73M2
ERYTHROCYTE [DISTWIDTH] IN BLOOD BY AUTOMATED COUNT: 13.4 % (ref 11–15)
GLUCOSE SERPL-MCNC: 108 MG/DL (ref 65–99)
HCT VFR BLD AUTO: 43.9 % (ref 35–45)
HDLC SERPL-MCNC: 63 MG/DL
HGB BLD-MCNC: 14.2 G/DL (ref 11.7–15.5)
LDLC SERPL CALC-MCNC: 71 MG/DL (CALC)
MCH RBC QN AUTO: 31.1 PG (ref 27–33)
MCHC RBC AUTO-ENTMCNC: 32.3 G/DL (ref 32–36)
MCV RBC AUTO: 96.1 FL (ref 80–100)
NONHDLC SERPL-MCNC: 93 MG/DL (CALC)
PLATELET # BLD AUTO: 314 THOUSAND/UL (ref 140–400)
PMV BLD REES-ECKER: 10.4 FL (ref 7.5–12.5)
POTASSIUM SERPL-SCNC: 4.4 MMOL/L (ref 3.5–5.3)
PROT SERPL-MCNC: 7.1 G/DL (ref 6.1–8.1)
RBC # BLD AUTO: 4.57 MILLION/UL (ref 3.8–5.1)
SODIUM SERPL-SCNC: 144 MMOL/L (ref 135–146)
TRIGL SERPL-MCNC: 138 MG/DL
TSH SERPL-ACNC: 3.12 MIU/L (ref 0.4–4.5)
VIT B12 SERPL-MCNC: 239 PG/ML (ref 200–1100)
WBC # BLD AUTO: 5.5 THOUSAND/UL (ref 3.8–10.8)

## 2025-05-05 ENCOUNTER — APPOINTMENT (OUTPATIENT)
Dept: PHYSICAL THERAPY | Facility: CLINIC | Age: 85
End: 2025-05-05
Payer: MEDICARE

## 2025-05-05 DIAGNOSIS — Z47.1 AFTERCARE FOLLOWING LEFT HIP JOINT REPLACEMENT SURGERY: ICD-10-CM

## 2025-05-05 DIAGNOSIS — Z96.642 AFTERCARE FOLLOWING LEFT HIP JOINT REPLACEMENT SURGERY: ICD-10-CM

## 2025-05-05 PROCEDURE — 97140 MANUAL THERAPY 1/> REGIONS: CPT | Mod: GP

## 2025-05-05 PROCEDURE — 97110 THERAPEUTIC EXERCISES: CPT | Mod: GP

## 2025-05-05 ASSESSMENT — PAIN - FUNCTIONAL ASSESSMENT: PAIN_FUNCTIONAL_ASSESSMENT: 0-10

## 2025-05-05 ASSESSMENT — PAIN SCALES - GENERAL: PAINLEVEL_OUTOF10: 0 - NO PAIN

## 2025-05-05 NOTE — PROGRESS NOTES
"Physical Therapy Treatment    Patient Name: Mckenna Ferguson  MRN: [id number]  Today's Date: 5/5/2025  Time Calculation  Start Time: 1300  Stop Time: 1344  Time Calculation (min): 44 min  PT Therapeutic Procedures Time Entry  Manual Therapy Time Entry: 15  Therapeutic Exercise Time Entry: 30    Insurance:  Visit number: Number of Treatments Authorized: 9/12  Authorization info: Approved [place] [facility] PT Authorization   12v total approved , 1 for eval and 11 for scheduling   Dates of Service: 3/19/25 to 6/16/25   CPT-[id number]/[id number]  DX- Z47.1  REF: [id number]  AUTH IS REQUIRED 40 COPAY OOP 6750  Insurance Type: Payor: [employer]ICARE / Plan: [employer]ARE ADVANTAGE / Product Type: *No Product type* /     Current Problem   1. Aftercare following left hip joint replacement surgery  Follow Up In Physical Therapy          Subjective   General   Reason for Referral: L MERCEDEZ  Referred By: Noel  Past Medical History Relevant to Rehab: back surgery 12 years ago, with residual foot drop L  General Comment: Patient has been trying to walk with her SPC in her house for short distances.  She fatigues quickly with reduced endurance. Back pain is less today.  Precautions:     Pain   Pain Assessment: 0-10  0-10 (Numeric) Pain Score: 0 - No pain  Pain Location: Hip  Pain Orientation: Left  Post Treatment Pain Level 0    Objective     Treatments:  Therapeutic Exercise:  Therapeutic Exercise  Therapeutic Exercise Activity 1: SciFit man L4 x 6 min  Therapeutic Exercise Activity 2: Standing BTB L TKE with weight shift L 3\" hold 2 x 10  Therapeutic Exercise Activity 3: standing iso glut sets 5\" x 10  Therapeutic Exercise Activity 4: supine - ADD with PPT x 2min  Therapeutic Exercise Activity 5: supine ADD with bridge x 2min  Therapeutic Exercise Activity 6: seated LAQ 0/0# x 2min  Therapeutic activity:   Gait training with SBQC x 8min     Manual:  Manual Therapy  Manual Therapy Activity 1: MFR - light B/L leg pull  Manual Therapy Activity 2: " "passive B/L - HS/Glutes/psoas/quad/pirif  Assessment   Assessment:   PT Assessment  Assessment Comment: Verbal cues to make her L leg \"strong\" when she weightbears through L LE with gait.  L LE appears longer in stance and she notes her L leg is more prone to drag on the floor.  Recommend she obtain a heel lift for her right shoe to help overcome length discrepancy.  She ambulated with safe technique when using SPC.  Limited by endurance.    Plan:   OP PT Plan  Certification Period Start Date: 03/19/25  Certification Period End Date: 06/16/25  Number of Treatments Authorized: 9/12    OP EDUCATION:  Outpatient Education  Education Comment: Access Code: [email or web address]  URL: [email or web address]  Date: 04/22/2025  Prepared by: Edilia Ling    Exercises  - Standing Terminal Knee Extension with Resistance  - 2 x daily - 7 x weekly - 10 reps - 3 hold    Goals:       "

## 2025-05-14 ENCOUNTER — TREATMENT (OUTPATIENT)
Dept: PHYSICAL THERAPY | Facility: CLINIC | Age: 85
End: 2025-05-14
Payer: MEDICARE

## 2025-05-14 DIAGNOSIS — Z96.642 AFTERCARE FOLLOWING LEFT HIP JOINT REPLACEMENT SURGERY: Primary | ICD-10-CM

## 2025-05-14 DIAGNOSIS — Z47.1 AFTERCARE FOLLOWING LEFT HIP JOINT REPLACEMENT SURGERY: Primary | ICD-10-CM

## 2025-05-14 ASSESSMENT — PAIN SCALES - GENERAL: PAINLEVEL_OUTOF10: 0 - NO PAIN

## 2025-05-14 ASSESSMENT — PAIN - FUNCTIONAL ASSESSMENT: PAIN_FUNCTIONAL_ASSESSMENT: 0-10

## 2025-05-14 NOTE — PROGRESS NOTES
"  Physical Therapy Treatment    Patient Name: Mckenna Ferguson  MRN: [id number]  Today's Date: 5/14/2025  Time Calculation  Start Time: 1049  Stop Time: 1138  Time Calculation (min): 49 min  PT Therapeutic Procedures Time Entry  Manual Therapy Time Entry: 9  Therapeutic Exercise Time Entry: 25  Therapeutic Activity Time Entry: 15,      Current Problem  Problem List Items Addressed This Visit    None  Visit Diagnoses         Codes      Aftercare following left hip joint replacement surgery    -  Primary Z47.1, Z96.642            Insurance:  Number of Treatments Authorized: 10/12  Certification Period Start Date: 03/19/25  Certification Period End Date: 06/16/25      Subjective   General  Reason for Referral: L MERCEDEZ  Referred By: Noel  Past Medical History Relevant to Rehab: back surgery 12 years ago, with residual foot drop L  General Comment: I'm so friggin weak.  Climbing the stairs at the end of the day climbing the stairs are fatiguing.  GTB is getting easy.  Fairly sedintary during the day. She obtained a heel cup for her right shoe and feels it's too soft - inquired about a different style lift.    Performing HEP?: Yes    Precautions     Pain  Pain Assessment: 0-10  0-10 (Numeric) Pain Score: 0 - No pain  Pain Location: Hip  Pain Orientation: Left    Objective       Treatments:    Therapeutic Exercise  Therapeutic Exercise Activity 1: SciFit man L4 x 6 min  Therapeutic Exercise Activity 2: lateral walking along // bar x 1 min  Therapeutic Exercise Activity 3: alt foot tap to 8\" step x 1 min  Therapeutic Exercise Activity 4: sseated BTB B hip ABD 5\" hold x 10 - with arms extended to increase core  Therapeutic Exercise Activity 5: seated BTB R/L iso hip abd x 10 ea  Therapeutic Exercise Activity 6: seated march x 1 min  Therapeutic Exercise Activity 7: seated alt LAQ 3\" hold 2 x 1 min         Manual Therapy  Manual Therapy Activity 1: MFR - light B/L leg pull  Manual Therapy Activity 2: passive B/L - " "HS/Glutes/psoas/quad/pirif    Therapeutic Activity  Therapeutic Activity 1: 4\" L lateral step overs x 1 min  Therapeutic Activity 2: sit to stand form plinth with therapist assist to increased WB L LE x 10  Therapeutic Activity 3: fitted patient for multi-level cork heel lift for her R shoe                OP EDUCATION:  Outpatient Education  Education Comment: try to take 3 short walks daily to increase endurance levels.    Assessment:  PT Assessment  Assessment Comment: Today's session focused on exercising to increase her endurance as well as progressing her HEP.    Plan:  OP PT Plan  Certification Period Start Date: 03/19/25  Certification Period End Date: 06/16/25  Number of Treatments Authorized: 10/12    Goals:       Lucinda Ling PTA  "

## 2025-05-16 ENCOUNTER — OFFICE VISIT (OUTPATIENT)
Dept: PRIMARY CARE | Facility: CLINIC | Age: 85
End: 2025-05-16
Payer: MEDICARE

## 2025-05-16 VITALS
DIASTOLIC BLOOD PRESSURE: 70 MMHG | WEIGHT: 170 LBS | HEIGHT: 66 IN | SYSTOLIC BLOOD PRESSURE: 130 MMHG | BODY MASS INDEX: 27.32 KG/M2

## 2025-05-16 DIAGNOSIS — I10 HYPERTENSION, UNSPECIFIED TYPE: ICD-10-CM

## 2025-05-16 DIAGNOSIS — E03.9 HYPOTHYROIDISM, UNSPECIFIED TYPE: ICD-10-CM

## 2025-05-16 DIAGNOSIS — E78.00 PURE HYPERCHOLESTEROLEMIA: ICD-10-CM

## 2025-05-16 PROCEDURE — 3075F SYST BP GE 130 - 139MM HG: CPT | Performed by: INTERNAL MEDICINE

## 2025-05-16 PROCEDURE — 1159F MED LIST DOCD IN RCRD: CPT | Performed by: INTERNAL MEDICINE

## 2025-05-16 PROCEDURE — 3078F DIAST BP <80 MM HG: CPT | Performed by: INTERNAL MEDICINE

## 2025-05-16 PROCEDURE — 99213 OFFICE O/P EST LOW 20 MIN: CPT | Performed by: INTERNAL MEDICINE

## 2025-05-16 NOTE — PROGRESS NOTES
"Subjective   Patient ID: Mckenna Ferguson is a 85 y.o. female who presents for Follow-up.    HPI   Patient is here for follow-up  Follow-up on hypertension high cholesterol hypothyroidism      Past recap  New patient visit  Complaining of feeling fatigue feeling chills all the time since she got discharged from rehab after the hip surgery  Has not seen a doctor in 3 years  She does see her rheumatologist and cardiologist  Follow-up on hypertension high cholesterol hypothyroidism    Past medical history: Hypertension, high cholesterol, hypothyroidism, CECI, cholecystectomy, back surgery, bilateral total hip replacement, left foot drop from back surgery, peripheral arterial disease paroxysmal atrial fibrillation, rheumatoid arthritis, osteoarthritis  Review of Systems    Objective   /70   Ht 1.676 m (5' 6\")   Wt 77.1 kg (170 lb)   BMI 27.44 kg/m²     Physical Exam  Vitals reviewed.   Constitutional:       Appearance: Normal appearance.   HENT:      Head: Normocephalic and atraumatic.      Right Ear: Tympanic membrane, ear canal and external ear normal.      Left Ear: Tympanic membrane, ear canal and external ear normal.      Nose: Nose normal.      Mouth/Throat:      Pharynx: Oropharynx is clear.   Eyes:      Extraocular Movements: Extraocular movements intact.      Conjunctiva/sclera: Conjunctivae normal.      Pupils: Pupils are equal, round, and reactive to light.   Cardiovascular:      Rate and Rhythm: Normal rate and regular rhythm.      Pulses: Normal pulses.      Heart sounds: Normal heart sounds.   Pulmonary:      Effort: Pulmonary effort is normal.      Breath sounds: Normal breath sounds.   Abdominal:      General: Abdomen is flat. Bowel sounds are normal.      Palpations: Abdomen is soft.   Musculoskeletal:      Cervical back: Normal range of motion and neck supple.   Skin:     General: Skin is warm and dry.   Neurological:      General: No focal deficit present.      Mental Status: She is alert and " oriented to person, place, and time.   Psychiatric:         Mood and Affect: Mood normal.         Assessment/Plan   Problem List Items Addressed This Visit           ICD-10-CM       Cardiac and Vasculature    Hypertension I10    Relevant Orders    CBC    Pure hypercholesterolemia E78.00    Relevant Orders    Comprehensive Metabolic Panel    Lipid Panel       Endocrine/Metabolic    Hypothyroidism E03.9    Relevant Orders    Thyroid Stimulating Hormone       Past recap  Patient's chart reviewed  Medication list reviewed  X-rays labs reviewed  We will do blood work  Will check vitamin B12 make sure she is not anemic which is causing the fatigue  Will also check vitamin D  Follow-up after blood work    5/16/2025  Blood work reviewed  Blood sugar borderline high  Blood pressure stable  Vitamin D okay   continue current medication  Patient is feeling better than last time  Will monitor her recheck blood work in 6 months  If symptoms gets worse advised to follow-up appointment to further testing      Additional Notes: Apply aldara once a day for two weeks.  No band-aid Detail Level: Simple

## 2025-05-21 ENCOUNTER — TREATMENT (OUTPATIENT)
Dept: PHYSICAL THERAPY | Facility: CLINIC | Age: 85
End: 2025-05-21
Payer: MEDICARE

## 2025-05-21 DIAGNOSIS — Z47.1 AFTERCARE FOLLOWING LEFT HIP JOINT REPLACEMENT SURGERY: Primary | ICD-10-CM

## 2025-05-21 DIAGNOSIS — Z96.642 AFTERCARE FOLLOWING LEFT HIP JOINT REPLACEMENT SURGERY: Primary | ICD-10-CM

## 2025-05-21 PROCEDURE — 97530 THERAPEUTIC ACTIVITIES: CPT | Mod: GP,CQ

## 2025-05-21 PROCEDURE — 97110 THERAPEUTIC EXERCISES: CPT | Mod: GP,CQ

## 2025-05-21 ASSESSMENT — PAIN - FUNCTIONAL ASSESSMENT: PAIN_FUNCTIONAL_ASSESSMENT: 0-10

## 2025-05-21 ASSESSMENT — PAIN SCALES - GENERAL: PAINLEVEL_OUTOF10: 0 - NO PAIN

## 2025-05-21 NOTE — PROGRESS NOTES
"  Physical Therapy Treatment    Patient Name: Mckenna Ferguson  MRN: [id number]  Today's Date: 5/21/2025  Time Calculation  Start Time: 1223  Stop Time: 1317  Time Calculation (min): 54 min  PT Therapeutic Procedures Time Entry  Therapeutic Exercise Time Entry: 40  Therapeutic Activity Time Entry: 14,      Current Problem  Problem List Items Addressed This Visit    None  Visit Diagnoses         Codes      Aftercare following left hip joint replacement surgery    -  Primary Z47.1, Z96.642            Insurance:  Number of Treatments Authorized: 11/12  Certification Period Start Date: 03/19/25  Certification Period End Date: 06/16/25      Subjective   General  Reason for Referral: L MERCEDEZ  Referred By: Noel  Past Medical History Relevant to Rehab: back surgery 12 years ago, with residual foot drop L  General Comment: My hip is doing well.  I think my stamina is improving. Patient wants to progress her HEP.  New heel lift make a difference and feels better.    Performing HEP?: Yes    Precautions     Pain  Pain Assessment: 0-10  0-10 (Numeric) Pain Score: 0 - No pain  Pain Location: Hip  Pain Orientation: Left    Objective   Patient ambulates with FWW, flexed at the trunk and behind walker  More upright ambulating out of the department    Treatments:    Therapeutic Exercise  Therapeutic Exercise Activity 1: SciFit man L4 x 6 min  Therapeutic Exercise Activity 2: standing iso glut squeezes 5\" x 10  Therapeutic Exercise Activity 3: standing 2#/2# march - emphasis on eccentric control and upright posture x 20  Therapeutic Exercise Activity 4: standing 2#/2# alt hip ABD with control and posture focus  Therapeutic Exercise Activity 5: standing 2#/2# alt hip ext with control and form  Therapeutic Exercise Activity 6: seated 2#/2# alt LAQ 3\" hold/3\" eccentric x 10 ea  Therapeutic Exercise Activity 7: standing alt HS curls with control and posture              Therapeutic Activity  Therapeutic Activity 1: sit to stand from " "plinth (21\") eccentric focus x 10                OP EDUCATION:  Outpatient Education  Education Comment: Access Code: [email or web address]  URL: [email or web address]  Date: 05/21/2025  Prepared by: Edilia Ling    Exercises  - Standing March with Counter Support  - 1 x daily - 7 x weekly - 3 sets - 10 reps  - Standing Knee Flexion with Counter Support  - 1 x daily - 7 x weekly - 3 sets - 10 reps  - Standing Hip Abduction with Ankle Weight  - 1 x daily - 7 x weekly - 3 sets - 10 reps  - Standing Hip Extension with Ankle Weight  - 1 x daily - 7 x weekly - 3 sets - 10 reps  - Seated Long Arc Quad with Ankle Weight  - 1 x daily - 7 x weekly - 3 sets - 10 reps  - Standing Romberg to 1/4 Tandem Stance  - 1 x daily - 7 x weekly - 2 reps - 60 hold    Assessment:  PT Assessment  Assessment Comment: Today's session focused on patient's technique and form with current exercises.  Added light weights to progress strength.  Discussed various ways to progress her HEP with resistance, increased hold times and eccentric focus.    Plan:  OP PT Plan  Certification Period Start Date: 03/19/25  Certification Period End Date: 06/16/25  Number of Treatments Authorized: 11/12    Goals:       Lucinda Ling, PTA  "

## 2025-05-28 ENCOUNTER — TREATMENT (OUTPATIENT)
Dept: PHYSICAL THERAPY | Facility: CLINIC | Age: 85
End: 2025-05-28
Payer: MEDICARE

## 2025-05-28 DIAGNOSIS — Z47.1 AFTERCARE FOLLOWING LEFT HIP JOINT REPLACEMENT SURGERY: ICD-10-CM

## 2025-05-28 DIAGNOSIS — Z96.642 AFTERCARE FOLLOWING LEFT HIP JOINT REPLACEMENT SURGERY: ICD-10-CM

## 2025-05-28 PROCEDURE — 97110 THERAPEUTIC EXERCISES: CPT | Mod: GP

## 2025-05-28 ASSESSMENT — PAIN - FUNCTIONAL ASSESSMENT: PAIN_FUNCTIONAL_ASSESSMENT: 0-10

## 2025-05-28 ASSESSMENT — PAIN SCALES - GENERAL: PAINLEVEL_OUTOF10: 0 - NO PAIN

## 2025-05-28 NOTE — PROGRESS NOTES
"Physical Therapy Treatment    Patient Name: Mckenna Ferguson  MRN: [id number]  Today's Date: 5/28/2025  Time Calculation  Start Time: 1215  Stop Time: 1300  Time Calculation (min): 45 min  PT Therapeutic Procedures Time Entry  Therapeutic Exercise Time Entry: 40    Insurance:  Visit number: Number of Treatments Authorized: 12/12  Authorization info: Approved [place] MCR PT Authorization   12v total approved , 1 for eval and 11 for scheduling   Dates of Service: 3/19/25 to 6/16/25   CPT-34701/98070  DX- Z47.1  REF: [id number]  AUTH IS REQUIRED 40 COPAY OOP 6750  Insurance Type: Payor: [employer] MEDICARE / Plan: [employer] MEDICARE ADVANTAGE / Product Type: *No Product type* /     Current Problem   1. Aftercare following left hip joint replacement surgery  Follow Up In Physical Therapy          Subjective   General   Reports feeling stronger. \"Feel like I'm walking better and working on my balance.\"  \"Completing my home exercises everyday.\"  Reason for Referral: L MERCEDEZ  Referred By: Noel  Past Medical History Relevant to Rehab: back surgery 12 years ago, with residual foot drop L  General Comment: My hip is doing well.  I think my stamina is improving. Patient wants to progress her HEP.  New heel lift make a difference and feels better. (Patient Independent with HEP and HEP progressions.)  Precautions:     Pain   Pain Assessment: 0-10  0-10 (Numeric) Pain Score: 0 - No pain  Pain Location: Hip  Pain Orientation: Left  Post Treatment Pain Level 0/10    Objective   MMT - Left Hip (5/5- flex/ext/ABD/ADD), Knee - 5/5 Flex/ext)  AROM - L hip - WFL    Treatments:  Therapeutic Exercise:  Therapeutic Exercise  Therapeutic Exercise Activity 1: SciFit man L4 x 6 min  Therapeutic Exercise Activity 2: standing iso glut squeezes 5\" x 10  Therapeutic Exercise Activity 3: standing 2#/2# march - emphasis on eccentric control and upright posture x 20  Therapeutic Exercise Activity 4: standing 2#/2# alt hip ABD with control and posture " "focus  Therapeutic Exercise Activity 5: standing 2#/2# alt hip ext with control and form  Therapeutic Exercise Activity 6: seated 2#/2# alt LAQ 3\" hold/3\" eccentric x 10 ea  Therapeutic Exercise Activity 7: standing alt HS curls with control and posture  Therapeutic Exercise Activity 8: supine bridging x20  Therapeutic Exercise Activity 9: PB - marching EO/EC x 20  Therapeutic Exercise Activity 10: PB - HT/TR x 20  Therapeutic Exercise Activity 11: PB - ABD x 20 R/L  Therapeutic Exercise Activity 12: PB - squats x 10 EO/EC       Assessment   Assessment:   PT Assessment  Assessment Comment: Today's session focused on patient's technique and form with current exercises.  Added light weights to progress strength.  Discussed various ways to progress her HEP with resistance, increased hold times and eccentric focus.    Plan:   OP PT Plan  Treatment/Interventions: Therapeutic activities, Therapeutic exercises, Gait training  Certification Period Start Date: 03/19/25  Certification Period End Date: 06/16/25  Number of Treatments Authorized: 12/12    OP EDUCATION:  Outpatient Education  Education Comment: Access Code: [email or web address]  URL: [email or web address]  Date: 05/21/2025  Prepared by: Edilia Ling    Exercises  - Standing March with Counter Support  - 1 x daily - 7 x weekly - 3 sets - 10 reps  - Standing Knee Flexion with Counter Support  - 1 x daily - 7 x weekly - 3 sets - 10 reps  - Standing Hip Abduction with Ankle Weight  - 1 x daily - 7 x weekly - 3 sets - 10 reps  - Standing Hip Extension with Ankle Weight  - 1 x daily - 7 x weekly - 3 sets - 10 reps  - Seated Long Arc Quad with Ankle Weight  - 1 x daily - 7 x weekly - 3 sets - 10 reps  - Standing Romberg to 1/4 Tandem Stance  - 1 x daily - 7 x weekly - 2 reps - 60 hold  Added - EC with standing/sitting HEP to increase proprioception.    Goals:   SHORT TERM GOALS:  Patient will report decrease in pain from 5/10 to </= 0/10 to improve quality " of life. A  Patient will improve L hip AROM to WFL in order to improve gait mechanics and functional mobility A  Patient will demonstrate sit to stand x10 without UE to demonstrate improved LE strength. A  Patient will improve 0 score to </= 10 seconds to reduce fall risk. A  Patient independent with prescribed HEP A  Pt Education - Independent postural and  awareness and joint protection program A  LONG TERM GOALS:  Patient will be independent with HEP to promote self management of condition A  Patient will perform reciprocal stair negotiation to demonstrate improved LE strength. PA  Patient will ambulate with least restrictive device and proper gait mechanics to promote return to prior level of function.  PA -(walker)  Functional Level - reported % (hobbies/work/recreation) A  PLAN - patient to continue with established HEP for the next 6-8wks. Follow-up with MD PRN.

## 2025-07-03 ENCOUNTER — APPOINTMENT (OUTPATIENT)
Dept: CARDIOLOGY | Facility: CLINIC | Age: 85
End: 2025-07-03
Payer: MEDICARE

## 2025-07-17 ENCOUNTER — OFFICE VISIT (OUTPATIENT)
Dept: CARDIOLOGY | Facility: CLINIC | Age: 85
End: 2025-07-17
Payer: MEDICARE

## 2025-07-17 VITALS
SYSTOLIC BLOOD PRESSURE: 139 MMHG | OXYGEN SATURATION: 97 % | DIASTOLIC BLOOD PRESSURE: 72 MMHG | WEIGHT: 172 LBS | HEART RATE: 67 BPM | BODY MASS INDEX: 27.76 KG/M2

## 2025-07-17 DIAGNOSIS — R53.83 OTHER FATIGUE: ICD-10-CM

## 2025-07-17 DIAGNOSIS — I10 HYPERTENSION, UNSPECIFIED TYPE: Primary | ICD-10-CM

## 2025-07-17 DIAGNOSIS — I48.0 PAROXYSMAL ATRIAL FIBRILLATION (MULTI): ICD-10-CM

## 2025-07-17 DIAGNOSIS — R42 DIZZINESS AND GIDDINESS: ICD-10-CM

## 2025-07-17 PROBLEM — R07.89 CHEST DISCOMFORT: Status: RESOLVED | Noted: 2025-04-23 | Resolved: 2025-07-17

## 2025-07-17 PROCEDURE — 99212 OFFICE O/P EST SF 10 MIN: CPT

## 2025-07-17 PROCEDURE — 3075F SYST BP GE 130 - 139MM HG: CPT | Performed by: INTERNAL MEDICINE

## 2025-07-17 PROCEDURE — 99214 OFFICE O/P EST MOD 30 MIN: CPT | Performed by: INTERNAL MEDICINE

## 2025-07-17 PROCEDURE — 3078F DIAST BP <80 MM HG: CPT | Performed by: INTERNAL MEDICINE

## 2025-07-17 PROCEDURE — 1159F MED LIST DOCD IN RCRD: CPT | Performed by: INTERNAL MEDICINE

## 2025-07-17 PROCEDURE — 93005 ELECTROCARDIOGRAM TRACING: CPT | Performed by: INTERNAL MEDICINE

## 2025-07-17 ASSESSMENT — ENCOUNTER SYMPTOMS
CONSTIPATION: 0
DIARRHEA: 0
HEMOPTYSIS: 0
DEPRESSION: 0
CHILLS: 0
HEMATURIA: 0
FALLS: 0
BLOATING: 0
OCCASIONAL FEELINGS OF UNSTEADINESS: 1
ABDOMINAL PAIN: 0
COUGH: 0
VOMITING: 0
MEMORY LOSS: 0
DYSURIA: 0
ALTERED MENTAL STATUS: 0
HEADACHES: 0
MYALGIAS: 0
LOSS OF SENSATION IN FEET: 0
NAUSEA: 0
WHEEZING: 0
FEVER: 0

## 2025-07-17 NOTE — PROGRESS NOTES
Chief Complaint   Patient presents with    Follow-up    Fatigue    Atrial Fibrillation      HPI  86 yo WF w/ h/o parox AFIB, HTN, HLD, hypothyroid now here for cardiology f/u. No further palps except for rare x couple seconds. No further tachy in bed on BB.  No chest pain. No dyspnea. +long h/o occ LH/dizziness +imbalance walking. No syncope. +occ mild dep LE edema, less on Valeriy. No claudication. No cough. +occ fatigue in PM; less since BB moved to PM. No insomnia/known snoring.  BP at home: 120s/70s, HR 70s (rare 100s)  ECG 4/21: SR (61)  ECG 4/23 (after nuc): AFIB (158), low volt, nonsp ST changes  ECG 9/24: SB (57)  ECG 9/24: SB (57)  ECG 7/25: SR (60), ?AMI  HM 4/23: SR w/ parox AFIB 1.1% (long 1h45m), HR  (avg 52), SVT x5 (long 13b)  Echo 7/23: SR (HR ~60), EF 65%, DD, valves ok, nl IVC  PAU 8/16: no ischemia, EF 60-65% -> >75%  Nuc 4/23: no ischemia/scar, EF >65%  PFT 8/12: normal  CT ab 11/16: no AAA  LE US 12/16: no DVT     Review of Systems   Constitutional: Negative for chills, fever and malaise/fatigue.   HENT:  Negative for hearing loss.    Eyes:  Negative for visual disturbance.   Respiratory:  Negative for cough, hemoptysis and wheezing.    Skin:  Negative for rash.   Musculoskeletal:  Negative for falls and myalgias.   Gastrointestinal:  Negative for bloating, abdominal pain, constipation, diarrhea, dysphagia, nausea and vomiting.   Genitourinary:  Negative for dysuria and hematuria.   Neurological:  Negative for headaches.   Psychiatric/Behavioral:  Negative for altered mental status, depression and memory loss.       Social History     Tobacco Use   Smoking Status Never   Smokeless Tobacco Never      Family History   Problem Relation Name Age of Onset    Breast cancer Mother         Allergies   Allergen Reactions    Morphine Nausea/vomiting and Nausea Only    Gluten Protein Diarrhea    Wheat Unknown    Wheat Bran Unknown    Egg GI Upset    Egg Derived Other    Gluten Diarrhea    Milk  Containing Products (Dairy) GI Upset and Other    Peanut Other and GI Upset     Stomach problems      Current Outpatient Medications   Medication Instructions    allopurinol (ZYLOPRIM) 100 mg, Daily    amLODIPine (NORVASC) 10 mg, oral, Daily    atenolol (TENORMIN) 25 mg, oral, Daily    atorvastatin (LIPITOR) 10 mg, oral, Daily, as directed    ergocalciferol (VITAMIN D-2) 50,000 Units, Every 30 days    gabapentin (NEURONTIN) 400 mg, Nightly    levothyroxine (Synthroid, Levoxyl) 75 mcg tablet 1 tablet    levothyroxine (SYNTHROID, LEVOXYL) 50 mcg, Once daily (morning) M-F (5 days a week)    multivitamin tablet 1 tablet, Daily    polyethylene glycol (Glycolax, Miralax) 17 gram/dose powder Mix 1 cap (17g) of powder into 8 ounces of fluid and drink once daily.    rivaroxaban (XARELTO) 20 mg, oral, Daily    spironolactone (ALDACTONE) 25 mg, oral, Daily      /72   Pulse 67   Wt 78 kg (172 lb)   SpO2 97%   BMI 27.76 kg/m²       Physical Exam  Constitutional:       Appearance: Normal appearance.   HENT:      Head: Normocephalic and atraumatic.      Nose: Nose normal.   Neck:      Vascular: No carotid bruit.     Cardiovascular:      Rate and Rhythm: Normal rate and regular rhythm.      Heart sounds: No murmur heard.  Pulmonary:      Effort: Pulmonary effort is normal.      Breath sounds: Normal breath sounds.   Abdominal:      Palpations: Abdomen is soft.      Tenderness: There is no abdominal tenderness.     Musculoskeletal:      Right lower leg: No edema.      Left lower leg: No edema.     Skin:     General: Skin is warm and dry.     Neurological:      General: No focal deficit present.      Mental Status: She is alert.     Psychiatric:         Mood and Affect: Mood normal.         Judgment: Judgment normal.        Results/Data  4/25 Cr 0.87, K 4.4, LDL 71, HDL 63, , Chol 156, HGB 14.2, , TSH 3.12  9/24 Cr 0.9, K 4.5, Mg 2.38, TSH 4.53, FT4 1.34, BNP 43  7/24 Mg 2.3, LFT nl, HGB 14.7,   8/23 TSH  3.2  6/23 Cr 0.76, K 3.3, HGB 12.8,   4/23 Cr 0.98, K 4.1, LFT nl, HGB 14.6, , TSH 5.26, FT4 1.04  3/23 CRP 0.17  3/22 LDL 80, HDL 71, , Chol 190     Assessment/Plan   84 yo WF w/ h/o parox AFIB, HTN, HLD, hypothyroid. Doing well. No further sig palps. Due to fatigue, check home sleep study. Try to keep euthyroid.   -continue Xarelto 20 qd  -continue Atenolol 25 every day (PM)  -continue Amlodipine 10 every day  -continue Valeriy 25 every day  -continue Atorva 10 qd  -try to maintain euthyroid  -f/u 6 months (earlier if needed)     John Park MD

## 2025-07-18 LAB
ATRIAL RATE: 60 BPM
P AXIS: -1 DEGREES
P OFFSET: 187 MS
P ONSET: 145 MS
PR INTERVAL: 146 MS
Q ONSET: 218 MS
QRS COUNT: 10 BEATS
QRS DURATION: 88 MS
QT INTERVAL: 410 MS
QTC CALCULATION(BAZETT): 410 MS
QTC FREDERICIA: 410 MS
R AXIS: -5 DEGREES
T AXIS: 42 DEGREES
T OFFSET: 423 MS
VENTRICULAR RATE: 60 BPM

## (undated) DEVICE — BLANKET, LOWER BODY, VHA PLUS, ADULT

## (undated) DEVICE — TUBING, IRRIGATION, HIGH FLOW, HAND PIECE SET

## (undated) DEVICE — DRAPE, IRRIGATION, W/POUCH, ADHESIVE STRIP, STERI DRAPE, 19 X 23 IN, DISPOSABLE, STERILE

## (undated) DEVICE — APPLICATION KIT, ADVANCED CEMENT MIXING/BIO-PREP CEMENT

## (undated) DEVICE — FEMORAL CANAL TIP FOR INTERPULSE HANDPIECE SET

## (undated) DEVICE — SYRINGE, 60 CC, IRRIGATION, BULB, CONTRO-BULB, PAPER POUCH

## (undated) DEVICE — GLOVE, SURGICAL, PROTEXIS PI BLUE W/NEUTHERA, 7.0, PF, LF

## (undated) DEVICE — GLOVE, SURGICAL, PROTEXIS PI , 7.5, PF, LF

## (undated) DEVICE — NEEDLE, SPINAL, QUINCKE, 18 G X 3.5 IN, PINK HUB

## (undated) DEVICE — Device

## (undated) DEVICE — STRIP, SKIN CLOSURE, STERI STRIP, REINFORCED, 0.5 X 4 IN

## (undated) DEVICE — MEDLINE SUTURE RETRIEVER, STERILE

## (undated) DEVICE — PREP KIT, BONE, BIO-PREP

## (undated) DEVICE — RETRIEVER, SUTURE, STERILE

## (undated) DEVICE — BLADE, SAW, SAGITTAL, 18.5 X 73 X 0.76 MM, STAINLESS STEEL, STERILE

## (undated) DEVICE — DRAPE, INCISE, STERI DRAPE, LARGE, 23 X 17 IN, DISPOSABLE, STERILE

## (undated) DEVICE — GLOVE, SURGICAL, PROTEXIS PI , 6.5, PF, LF

## (undated) DEVICE — SUTURE, VICRYL, 2-0, 18 IN CP-2, UNDYED

## (undated) DEVICE — SUTURE, ETHIBOND XTRA, 5 V-37, GRN/BR, LF

## (undated) DEVICE — SUTURE, VICRYL, 0, 18 IN, CT-1, UNDYED

## (undated) DEVICE — CEMENTRALIZER STEM CENTRALIZER 9.25MM CEMENTED
Type: IMPLANTABLE DEVICE | Site: HIP | Status: NON-FUNCTIONAL
Brand: CEMENTRALIZER

## (undated) DEVICE — ADULT REM POLYHESIVE II PATIENT RETURN ELECTRODE W/9 FT (2.7 M) ATTACHED CORD

## (undated) DEVICE — HOOD, SURGICAL, FLYTE HYBRID

## (undated) DEVICE — SYRINGE, 50 CC, LUER LOCK

## (undated) DEVICE — DRAPE, ISOLATION, INCISE, W/POUCH, STERI DRAPE, 125 X 83 IN, DISPOSABLE, STERILE

## (undated) DEVICE — GLOVE, SURGICAL, PROTEXIS PI , 8.0, PF, LF